# Patient Record
Sex: MALE | Race: BLACK OR AFRICAN AMERICAN | NOT HISPANIC OR LATINO | Employment: OTHER | URBAN - METROPOLITAN AREA
[De-identification: names, ages, dates, MRNs, and addresses within clinical notes are randomized per-mention and may not be internally consistent; named-entity substitution may affect disease eponyms.]

---

## 2017-01-24 ENCOUNTER — OFFICE VISIT - HEALTHEAST (OUTPATIENT)
Dept: FAMILY MEDICINE | Facility: CLINIC | Age: 77
End: 2017-01-24

## 2017-01-24 ENCOUNTER — COMMUNICATION - HEALTHEAST (OUTPATIENT)
Dept: FAMILY MEDICINE | Facility: CLINIC | Age: 77
End: 2017-01-24

## 2017-01-24 DIAGNOSIS — S01.91XA LACERATION OF HEAD: ICD-10-CM

## 2017-02-21 ENCOUNTER — RECORDS - HEALTHEAST (OUTPATIENT)
Dept: ADMINISTRATIVE | Facility: OTHER | Age: 77
End: 2017-02-21

## 2017-02-24 ENCOUNTER — AMBULATORY - HEALTHEAST (OUTPATIENT)
Dept: PULMONOLOGY | Facility: OTHER | Age: 77
End: 2017-02-24

## 2017-02-24 ENCOUNTER — HOME CARE/HOSPICE - HEALTHEAST (OUTPATIENT)
Dept: HOME HEALTH SERVICES | Facility: HOME HEALTH | Age: 77
End: 2017-02-24

## 2017-02-24 DIAGNOSIS — R91.1 LUNG NODULE: ICD-10-CM

## 2017-02-25 ENCOUNTER — COMMUNICATION - HEALTHEAST (OUTPATIENT)
Dept: SCHEDULING | Facility: CLINIC | Age: 77
End: 2017-02-25

## 2017-02-28 ENCOUNTER — HOME CARE/HOSPICE - HEALTHEAST (OUTPATIENT)
Dept: HOME HEALTH SERVICES | Facility: HOME HEALTH | Age: 77
End: 2017-02-28

## 2017-03-01 ENCOUNTER — COMMUNICATION - HEALTHEAST (OUTPATIENT)
Dept: FAMILY MEDICINE | Facility: CLINIC | Age: 77
End: 2017-03-01

## 2017-03-04 ENCOUNTER — HOME CARE/HOSPICE - HEALTHEAST (OUTPATIENT)
Dept: HOME HEALTH SERVICES | Facility: HOME HEALTH | Age: 77
End: 2017-03-04

## 2017-03-04 ASSESSMENT — MIFFLIN-ST. JEOR: SCORE: 2024.88

## 2017-03-05 ENCOUNTER — HOME CARE/HOSPICE - HEALTHEAST (OUTPATIENT)
Dept: HOME HEALTH SERVICES | Facility: HOME HEALTH | Age: 77
End: 2017-03-05

## 2017-03-06 ENCOUNTER — HOME CARE/HOSPICE - HEALTHEAST (OUTPATIENT)
Dept: HOME HEALTH SERVICES | Facility: HOME HEALTH | Age: 77
End: 2017-03-06

## 2017-03-06 ENCOUNTER — RECORDS - HEALTHEAST (OUTPATIENT)
Dept: ADMINISTRATIVE | Facility: OTHER | Age: 77
End: 2017-03-06

## 2017-03-08 ENCOUNTER — HOME CARE/HOSPICE - HEALTHEAST (OUTPATIENT)
Dept: HOME HEALTH SERVICES | Facility: HOME HEALTH | Age: 77
End: 2017-03-08

## 2017-03-10 ENCOUNTER — RECORDS - HEALTHEAST (OUTPATIENT)
Dept: ADMINISTRATIVE | Facility: OTHER | Age: 77
End: 2017-03-10

## 2017-03-10 ENCOUNTER — HOME CARE/HOSPICE - HEALTHEAST (OUTPATIENT)
Dept: HOME HEALTH SERVICES | Facility: HOME HEALTH | Age: 77
End: 2017-03-10

## 2017-03-13 ENCOUNTER — HOME CARE/HOSPICE - HEALTHEAST (OUTPATIENT)
Dept: HOME HEALTH SERVICES | Facility: HOME HEALTH | Age: 77
End: 2017-03-13

## 2017-03-15 ENCOUNTER — HOME CARE/HOSPICE - HEALTHEAST (OUTPATIENT)
Dept: HOME HEALTH SERVICES | Facility: HOME HEALTH | Age: 77
End: 2017-03-15

## 2017-03-17 ENCOUNTER — HOME CARE/HOSPICE - HEALTHEAST (OUTPATIENT)
Dept: HOME HEALTH SERVICES | Facility: HOME HEALTH | Age: 77
End: 2017-03-17

## 2017-03-20 ENCOUNTER — HOME CARE/HOSPICE - HEALTHEAST (OUTPATIENT)
Dept: HOME HEALTH SERVICES | Facility: HOME HEALTH | Age: 77
End: 2017-03-20

## 2017-03-24 ENCOUNTER — HOME CARE/HOSPICE - HEALTHEAST (OUTPATIENT)
Dept: HOME HEALTH SERVICES | Facility: HOME HEALTH | Age: 77
End: 2017-03-24

## 2017-03-29 ENCOUNTER — OFFICE VISIT - HEALTHEAST (OUTPATIENT)
Dept: SLEEP MEDICINE | Facility: CLINIC | Age: 77
End: 2017-03-29

## 2017-03-29 DIAGNOSIS — R79.81 ELEVATED CO2 LEVEL: ICD-10-CM

## 2017-03-29 DIAGNOSIS — G47.33 OSA (OBSTRUCTIVE SLEEP APNEA): ICD-10-CM

## 2017-03-30 ENCOUNTER — COMMUNICATION - HEALTHEAST (OUTPATIENT)
Dept: PULMONOLOGY | Facility: OTHER | Age: 77
End: 2017-03-30

## 2017-03-31 ENCOUNTER — HOME CARE/HOSPICE - HEALTHEAST (OUTPATIENT)
Dept: HOME HEALTH SERVICES | Facility: HOME HEALTH | Age: 77
End: 2017-03-31

## 2017-04-04 ENCOUNTER — RECORDS - HEALTHEAST (OUTPATIENT)
Dept: SLEEP MEDICINE | Facility: CLINIC | Age: 77
End: 2017-04-04

## 2017-04-04 ENCOUNTER — RECORDS - HEALTHEAST (OUTPATIENT)
Dept: ADMINISTRATIVE | Facility: OTHER | Age: 77
End: 2017-04-04

## 2017-04-04 DIAGNOSIS — R79.81 ABNORMAL BLOOD-GAS LEVEL: ICD-10-CM

## 2017-04-04 DIAGNOSIS — G47.33 OBSTRUCTIVE SLEEP APNEA (ADULT) (PEDIATRIC): ICD-10-CM

## 2017-04-19 ENCOUNTER — AMBULATORY - HEALTHEAST (OUTPATIENT)
Dept: SLEEP MEDICINE | Facility: CLINIC | Age: 77
End: 2017-04-19

## 2017-04-19 ENCOUNTER — OFFICE VISIT - HEALTHEAST (OUTPATIENT)
Dept: SLEEP MEDICINE | Facility: CLINIC | Age: 77
End: 2017-04-19

## 2017-04-19 DIAGNOSIS — G47.34 SLEEP RELATED HYPOXIA: ICD-10-CM

## 2017-04-19 DIAGNOSIS — G47.33 OSA (OBSTRUCTIVE SLEEP APNEA): ICD-10-CM

## 2017-04-19 DIAGNOSIS — J44.9 CHRONIC OBSTRUCTIVE PULMONARY DISEASE, UNSPECIFIED COPD TYPE (H): ICD-10-CM

## 2017-04-20 ENCOUNTER — COMMUNICATION - HEALTHEAST (OUTPATIENT)
Dept: SLEEP MEDICINE | Facility: CLINIC | Age: 77
End: 2017-04-20

## 2017-04-21 ENCOUNTER — HOME CARE/HOSPICE - HEALTHEAST (OUTPATIENT)
Dept: HOME HEALTH SERVICES | Facility: HOME HEALTH | Age: 77
End: 2017-04-21

## 2017-04-25 ENCOUNTER — COMMUNICATION - HEALTHEAST (OUTPATIENT)
Dept: SCHEDULING | Facility: CLINIC | Age: 77
End: 2017-04-25

## 2017-06-07 ENCOUNTER — OFFICE VISIT - HEALTHEAST (OUTPATIENT)
Dept: SLEEP MEDICINE | Facility: CLINIC | Age: 77
End: 2017-06-07

## 2017-06-07 ENCOUNTER — AMBULATORY - HEALTHEAST (OUTPATIENT)
Dept: SLEEP MEDICINE | Facility: CLINIC | Age: 77
End: 2017-06-07

## 2017-06-07 DIAGNOSIS — G47.33 OSA ON CPAP: ICD-10-CM

## 2017-06-07 ASSESSMENT — MIFFLIN-ST. JEOR: SCORE: 1979.52

## 2017-12-07 ENCOUNTER — AMBULATORY - HEALTHEAST (OUTPATIENT)
Dept: SLEEP MEDICINE | Facility: CLINIC | Age: 77
End: 2017-12-07

## 2018-02-28 ENCOUNTER — RECORDS - HEALTHEAST (OUTPATIENT)
Dept: LAB | Facility: CLINIC | Age: 78
End: 2018-02-28

## 2018-02-28 LAB
ALBUMIN SERPL-MCNC: 3.8 G/DL (ref 3.5–5)
ALP SERPL-CCNC: 83 U/L (ref 45–120)
ALT SERPL W P-5'-P-CCNC: 17 U/L (ref 0–45)
ANION GAP SERPL CALCULATED.3IONS-SCNC: 8 MMOL/L (ref 5–18)
AST SERPL W P-5'-P-CCNC: 13 U/L (ref 0–40)
BASOPHILS # BLD AUTO: 0.1 THOU/UL (ref 0–0.2)
BASOPHILS NFR BLD AUTO: 1 % (ref 0–2)
BILIRUB SERPL-MCNC: 0.4 MG/DL (ref 0–1)
BUN SERPL-MCNC: 29 MG/DL (ref 8–28)
CALCIUM SERPL-MCNC: 9.5 MG/DL (ref 8.5–10.5)
CHLORIDE BLD-SCNC: 100 MMOL/L (ref 98–107)
CO2 SERPL-SCNC: 29 MMOL/L (ref 22–31)
CREAT SERPL-MCNC: 1.75 MG/DL (ref 0.7–1.3)
EOSINOPHIL # BLD AUTO: 0.1 THOU/UL (ref 0–0.4)
EOSINOPHIL NFR BLD AUTO: 1 % (ref 0–6)
ERYTHROCYTE [DISTWIDTH] IN BLOOD BY AUTOMATED COUNT: 14.6 % (ref 11–14.5)
GFR SERPL CREATININE-BSD FRML MDRD: 38 ML/MIN/1.73M2
GLUCOSE BLD-MCNC: 99 MG/DL (ref 70–125)
HCT VFR BLD AUTO: 42.6 % (ref 40–54)
HGB BLD-MCNC: 12.9 G/DL (ref 14–18)
LYMPHOCYTES # BLD AUTO: 2.2 THOU/UL (ref 0.8–4.4)
LYMPHOCYTES NFR BLD AUTO: 27 % (ref 20–40)
MCH RBC QN AUTO: 27.1 PG (ref 27–34)
MCHC RBC AUTO-ENTMCNC: 30.3 G/DL (ref 32–36)
MCV RBC AUTO: 90 FL (ref 80–100)
MONOCYTES # BLD AUTO: 0.7 THOU/UL (ref 0–0.9)
MONOCYTES NFR BLD AUTO: 8 % (ref 2–10)
NEUTROPHILS # BLD AUTO: 5.3 THOU/UL (ref 2–7.7)
NEUTROPHILS NFR BLD AUTO: 64 % (ref 50–70)
PLATELET # BLD AUTO: 235 THOU/UL (ref 140–440)
PMV BLD AUTO: 10.8 FL (ref 8.5–12.5)
POTASSIUM BLD-SCNC: 5.5 MMOL/L (ref 3.5–5)
PROT SERPL-MCNC: 7.6 G/DL (ref 6–8)
RBC # BLD AUTO: 4.76 MILL/UL (ref 4.4–6.2)
SODIUM SERPL-SCNC: 137 MMOL/L (ref 136–145)
WBC: 8.3 THOU/UL (ref 4–11)

## 2018-06-01 ENCOUNTER — RECORDS - HEALTHEAST (OUTPATIENT)
Dept: ADMINISTRATIVE | Facility: OTHER | Age: 78
End: 2018-06-01

## 2018-06-01 LAB
LAB AP CHARGES (HE HISTORICAL CONVERSION): NORMAL
PATH REPORT.COMMENTS IMP SPEC: NORMAL
PATH REPORT.COMMENTS IMP SPEC: NORMAL
PATH REPORT.FINAL DX SPEC: NORMAL
PATH REPORT.GROSS SPEC: NORMAL
PATH REPORT.MICROSCOPIC SPEC OTHER STN: NORMAL
PATH REPORT.RELEVANT HX SPEC: NORMAL
RESULT FLAG (HE HISTORICAL CONVERSION): NORMAL

## 2019-03-27 ENCOUNTER — RECORDS - HEALTHEAST (OUTPATIENT)
Dept: LAB | Facility: CLINIC | Age: 79
End: 2019-03-27

## 2019-03-27 LAB
ALBUMIN SERPL-MCNC: 4.1 G/DL (ref 3.5–5)
ALP SERPL-CCNC: 71 U/L (ref 45–120)
ALT SERPL W P-5'-P-CCNC: 13 U/L (ref 0–45)
ANION GAP SERPL CALCULATED.3IONS-SCNC: 7 MMOL/L (ref 5–18)
AST SERPL W P-5'-P-CCNC: 13 U/L (ref 0–40)
BASOPHILS # BLD AUTO: 0 THOU/UL (ref 0–0.2)
BASOPHILS NFR BLD AUTO: 0 % (ref 0–2)
BILIRUB SERPL-MCNC: 0.4 MG/DL (ref 0–1)
BUN SERPL-MCNC: 23 MG/DL (ref 8–28)
CALCIUM SERPL-MCNC: 9.5 MG/DL (ref 8.5–10.5)
CHLORIDE BLD-SCNC: 102 MMOL/L (ref 98–107)
CHOLEST SERPL-MCNC: 176 MG/DL
CO2 SERPL-SCNC: 31 MMOL/L (ref 22–31)
CREAT SERPL-MCNC: 1.57 MG/DL (ref 0.7–1.3)
EOSINOPHIL # BLD AUTO: 0 THOU/UL (ref 0–0.4)
EOSINOPHIL NFR BLD AUTO: 0 % (ref 0–6)
ERYTHROCYTE [DISTWIDTH] IN BLOOD BY AUTOMATED COUNT: 15.2 % (ref 11–14.5)
FASTING STATUS PATIENT QL REPORTED: NORMAL
GFR SERPL CREATININE-BSD FRML MDRD: 43 ML/MIN/1.73M2
GLUCOSE BLD-MCNC: 74 MG/DL (ref 70–125)
HCT VFR BLD AUTO: 43.1 % (ref 40–54)
HDLC SERPL-MCNC: 48 MG/DL
HGB BLD-MCNC: 13 G/DL (ref 14–18)
LDLC SERPL CALC-MCNC: 105 MG/DL
LYMPHOCYTES # BLD AUTO: 2.4 THOU/UL (ref 0.8–4.4)
LYMPHOCYTES NFR BLD AUTO: 32 % (ref 20–40)
MCH RBC QN AUTO: 27.4 PG (ref 27–34)
MCHC RBC AUTO-ENTMCNC: 30.2 G/DL (ref 32–36)
MCV RBC AUTO: 91 FL (ref 80–100)
MONOCYTES # BLD AUTO: 0.6 THOU/UL (ref 0–0.9)
MONOCYTES NFR BLD AUTO: 7 % (ref 2–10)
NEUTROPHILS # BLD AUTO: 4.5 THOU/UL (ref 2–7.7)
NEUTROPHILS NFR BLD AUTO: 60 % (ref 50–70)
PLATELET # BLD AUTO: 229 THOU/UL (ref 140–440)
PMV BLD AUTO: 10.8 FL (ref 8.5–12.5)
POTASSIUM BLD-SCNC: 5.1 MMOL/L (ref 3.5–5)
PROT SERPL-MCNC: 7.4 G/DL (ref 6–8)
PSA SERPL-MCNC: 5.3 NG/ML (ref 0–6.5)
RBC # BLD AUTO: 4.75 MILL/UL (ref 4.4–6.2)
SODIUM SERPL-SCNC: 140 MMOL/L (ref 136–145)
TRIGL SERPL-MCNC: 115 MG/DL
WBC: 7.6 THOU/UL (ref 4–11)

## 2019-03-28 LAB — HBA1C MFR BLD: 6.6 % (ref 4.2–6.1)

## 2020-05-15 ENCOUNTER — RECORDS - HEALTHEAST (OUTPATIENT)
Dept: LAB | Facility: CLINIC | Age: 80
End: 2020-05-15

## 2020-05-15 LAB
ALBUMIN SERPL-MCNC: 3.9 G/DL (ref 3.5–5)
ALBUMIN UR-MCNC: ABNORMAL MG/DL
ALP SERPL-CCNC: 61 U/L (ref 45–120)
ALT SERPL W P-5'-P-CCNC: 12 U/L (ref 0–45)
AMORPH CRY #/AREA URNS HPF: ABNORMAL /[HPF]
ANION GAP SERPL CALCULATED.3IONS-SCNC: 9 MMOL/L (ref 5–18)
APPEARANCE UR: ABNORMAL
AST SERPL W P-5'-P-CCNC: 12 U/L (ref 0–40)
BACTERIA #/AREA URNS HPF: ABNORMAL HPF
BASOPHILS # BLD AUTO: 0 THOU/UL (ref 0–0.2)
BASOPHILS NFR BLD AUTO: 0 % (ref 0–2)
BILIRUB SERPL-MCNC: 0.4 MG/DL (ref 0–1)
BILIRUB UR QL STRIP: NEGATIVE
BUN SERPL-MCNC: 32 MG/DL (ref 8–28)
CALCIUM SERPL-MCNC: 9.2 MG/DL (ref 8.5–10.5)
CHLORIDE BLD-SCNC: 101 MMOL/L (ref 98–107)
CHOLEST SERPL-MCNC: 146 MG/DL
CO2 SERPL-SCNC: 29 MMOL/L (ref 22–31)
COLOR UR AUTO: YELLOW
CREAT SERPL-MCNC: 2.07 MG/DL (ref 0.7–1.3)
EOSINOPHIL # BLD AUTO: 0.1 THOU/UL (ref 0–0.4)
EOSINOPHIL NFR BLD AUTO: 1 % (ref 0–6)
ERYTHROCYTE [DISTWIDTH] IN BLOOD BY AUTOMATED COUNT: 15.3 % (ref 11–14.5)
FASTING STATUS PATIENT QL REPORTED: NORMAL
GFR SERPL CREATININE-BSD FRML MDRD: 31 ML/MIN/1.73M2
GLUCOSE BLD-MCNC: 96 MG/DL (ref 70–125)
GLUCOSE UR STRIP-MCNC: NEGATIVE MG/DL
HBA1C MFR BLD: 7.2 %
HCT VFR BLD AUTO: 45.7 % (ref 40–54)
HDLC SERPL-MCNC: 46 MG/DL
HGB BLD-MCNC: 13.7 G/DL (ref 14–18)
HGB UR QL STRIP: NEGATIVE
HYALINE CASTS #/AREA URNS LPF: ABNORMAL LPF
KETONES UR STRIP-MCNC: NEGATIVE MG/DL
LDLC SERPL CALC-MCNC: 74 MG/DL
LEUKOCYTE ESTERASE UR QL STRIP: NEGATIVE
LYMPHOCYTES # BLD AUTO: 2.6 THOU/UL (ref 0.8–4.4)
LYMPHOCYTES NFR BLD AUTO: 33 % (ref 20–40)
MCH RBC QN AUTO: 27.6 PG (ref 27–34)
MCHC RBC AUTO-ENTMCNC: 30 G/DL (ref 32–36)
MCV RBC AUTO: 92 FL (ref 80–100)
MONOCYTES # BLD AUTO: 0.7 THOU/UL (ref 0–0.9)
MONOCYTES NFR BLD AUTO: 9 % (ref 2–10)
MUCOUS THREADS #/AREA URNS LPF: ABNORMAL LPF
NEUTROPHILS # BLD AUTO: 4.6 THOU/UL (ref 2–7.7)
NEUTROPHILS NFR BLD AUTO: 57 % (ref 50–70)
NITRATE UR QL: NEGATIVE
PH UR STRIP: 5.5 [PH] (ref 4.5–8)
PLATELET # BLD AUTO: 214 THOU/UL (ref 140–440)
PMV BLD AUTO: 11.6 FL (ref 8.5–12.5)
POTASSIUM BLD-SCNC: 5.2 MMOL/L (ref 3.5–5)
PROT SERPL-MCNC: 7.5 G/DL (ref 6–8)
RBC # BLD AUTO: 4.97 MILL/UL (ref 4.4–6.2)
RBC #/AREA URNS AUTO: ABNORMAL HPF
SODIUM SERPL-SCNC: 139 MMOL/L (ref 136–145)
SP GR UR STRIP: 1.02 (ref 1–1.03)
SQUAMOUS #/AREA URNS AUTO: ABNORMAL LPF
TRIGL SERPL-MCNC: 132 MG/DL
UROBILINOGEN UR STRIP-ACNC: ABNORMAL
WBC #/AREA URNS AUTO: ABNORMAL HPF
WBC: 8 THOU/UL (ref 4–11)

## 2021-05-25 ENCOUNTER — RECORDS - HEALTHEAST (OUTPATIENT)
Dept: ADMINISTRATIVE | Facility: CLINIC | Age: 81
End: 2021-05-25

## 2021-05-27 ENCOUNTER — RECORDS - HEALTHEAST (OUTPATIENT)
Dept: ADMINISTRATIVE | Facility: CLINIC | Age: 81
End: 2021-05-27

## 2021-05-28 ENCOUNTER — RECORDS - HEALTHEAST (OUTPATIENT)
Dept: LAB | Facility: CLINIC | Age: 81
End: 2021-05-28

## 2021-05-28 LAB
ALBUMIN SERPL-MCNC: 4.2 G/DL (ref 3.5–5)
ALP SERPL-CCNC: 72 U/L (ref 45–120)
ALT SERPL W P-5'-P-CCNC: 14 U/L (ref 0–45)
ANION GAP SERPL CALCULATED.3IONS-SCNC: 11 MMOL/L (ref 5–18)
AST SERPL W P-5'-P-CCNC: 13 U/L (ref 0–40)
BASOPHILS # BLD AUTO: 0 THOU/UL (ref 0–0.2)
BASOPHILS NFR BLD AUTO: 0 % (ref 0–2)
BILIRUB SERPL-MCNC: 0.4 MG/DL (ref 0–1)
BUN SERPL-MCNC: 37 MG/DL (ref 8–28)
CALCIUM SERPL-MCNC: 9.1 MG/DL (ref 8.5–10.5)
CHLORIDE BLD-SCNC: 100 MMOL/L (ref 98–107)
CO2 SERPL-SCNC: 26 MMOL/L (ref 22–31)
CREAT SERPL-MCNC: 2.02 MG/DL (ref 0.7–1.3)
EOSINOPHIL # BLD AUTO: 0.1 THOU/UL (ref 0–0.4)
EOSINOPHIL NFR BLD AUTO: 1 % (ref 0–6)
ERYTHROCYTE [DISTWIDTH] IN BLOOD BY AUTOMATED COUNT: 14.8 % (ref 11–14.5)
GFR SERPL CREATININE-BSD FRML MDRD: 32 ML/MIN/1.73M2
GLUCOSE BLD-MCNC: 113 MG/DL (ref 70–125)
HCT VFR BLD AUTO: 42.2 % (ref 40–54)
HGB BLD-MCNC: 13.1 G/DL (ref 14–18)
IMM GRANULOCYTES # BLD: 0 THOU/UL
IMM GRANULOCYTES NFR BLD: 0 %
LIPASE SERPL-CCNC: 32 U/L (ref 0–52)
LYMPHOCYTES # BLD AUTO: 2.3 THOU/UL (ref 0.8–4.4)
LYMPHOCYTES NFR BLD AUTO: 30 % (ref 20–40)
MCH RBC QN AUTO: 28.4 PG (ref 27–34)
MCHC RBC AUTO-ENTMCNC: 31 G/DL (ref 32–36)
MCV RBC AUTO: 92 FL (ref 80–100)
MONOCYTES # BLD AUTO: 0.7 THOU/UL (ref 0–0.9)
MONOCYTES NFR BLD AUTO: 9 % (ref 2–10)
NEUTROPHILS # BLD AUTO: 4.5 THOU/UL (ref 2–7.7)
NEUTROPHILS NFR BLD AUTO: 60 % (ref 50–70)
PLATELET # BLD AUTO: 198 THOU/UL (ref 140–440)
PMV BLD AUTO: 11 FL (ref 8.5–12.5)
POTASSIUM BLD-SCNC: 5.7 MMOL/L (ref 3.5–5)
PROT SERPL-MCNC: 7.4 G/DL (ref 6–8)
RBC # BLD AUTO: 4.61 MILL/UL (ref 4.4–6.2)
SODIUM SERPL-SCNC: 137 MMOL/L (ref 136–145)
WBC: 7.5 THOU/UL (ref 4–11)

## 2021-05-30 VITALS — BODY MASS INDEX: 33.92 KG/M2 | WEIGHT: 271.4 LBS

## 2021-05-30 VITALS — HEIGHT: 73 IN | BODY MASS INDEX: 36.84 KG/M2 | WEIGHT: 278 LBS

## 2021-05-30 VITALS — WEIGHT: 269.5 LBS | BODY MASS INDEX: 35.56 KG/M2

## 2021-05-31 VITALS — HEIGHT: 73 IN | WEIGHT: 268 LBS | BODY MASS INDEX: 35.52 KG/M2

## 2021-06-08 ENCOUNTER — RECORDS - HEALTHEAST (OUTPATIENT)
Dept: LAB | Facility: CLINIC | Age: 81
End: 2021-06-08

## 2021-06-08 LAB
ALBUMIN SERPL-MCNC: 4.1 G/DL (ref 3.5–5)
ALP SERPL-CCNC: 75 U/L (ref 45–120)
ALT SERPL W P-5'-P-CCNC: 18 U/L (ref 0–45)
ANION GAP SERPL CALCULATED.3IONS-SCNC: 11 MMOL/L (ref 5–18)
AST SERPL W P-5'-P-CCNC: 14 U/L (ref 0–40)
BILIRUB SERPL-MCNC: 0.5 MG/DL (ref 0–1)
BUN SERPL-MCNC: 24 MG/DL (ref 8–28)
CALCIUM SERPL-MCNC: 9.1 MG/DL (ref 8.5–10.5)
CHLORIDE BLD-SCNC: 102 MMOL/L (ref 98–107)
CO2 SERPL-SCNC: 25 MMOL/L (ref 22–31)
CREAT SERPL-MCNC: 1.66 MG/DL (ref 0.7–1.3)
GFR SERPL CREATININE-BSD FRML MDRD: 40 ML/MIN/1.73M2
GLUCOSE BLD-MCNC: 112 MG/DL (ref 70–125)
POTASSIUM BLD-SCNC: 5.7 MMOL/L (ref 3.5–5)
PROT SERPL-MCNC: 7.2 G/DL (ref 6–8)
SODIUM SERPL-SCNC: 138 MMOL/L (ref 136–145)

## 2021-06-08 NOTE — PROGRESS NOTES
Assessment:      9 cm fore head laceration      Plan:      The wound area was irrigated with sterile saline and draped in a sterile fashion.  The wound area was anesthetized with Lidocaine 2% with epinephrine without added sodium bicarbonate.  The wound was explored with the following results No foreign bodies found.  The wound was repaired with 3-0 Ethilon; 13 sutures were used.  The wound was dressed and antibiotic ointment was applied.  Wound care discussed.  Tetanus immunization given.  Suture removal in 7 days.     Subjective:      Soto Gibbs is a 77 y.o. male who presents for evaluation of a laceration to the left forehead. Injury occurred 1 hour ago. The mechanism of the wound was hit head on toilet. The patient reports no coldness, no numbness, no pain, no paresthesias in the affected area. There were no other injuries.  Patient denies loss of consciousness, neck pain, chest pain, abdominal pain, extremity injury, numbness and weakness. The tetanus status is more than 10 years ago.  The following portions of the patient's history were reviewed and updated as appropriate: allergies, current medications, past family history, past medical history, past social history, past surgical history and problem list.    Review of Systems  A 12 point comprehensive review of systems was negative except as noted.      Objective:        Visit Vitals     /58     Pulse 69     Temp 98.2  F (36.8  C) (Oral)     Wt (!) 271 lb 6.4 oz (123.1 kg)     SpO2 (!) 84%     BMI 33.92 kg/m2     O2 sat not working correctly.  Patient in no distress, good color, no SOB.    There is a curved laceration measuring approximately 9 cm in length on the left forehead. Examination of the wound for foreign bodies and devitalized tissue showed none.  Examination of the surrounding area for neural or vascular damage showed none.

## 2021-06-09 NOTE — PROGRESS NOTES
Dear  Caity Jung Md  [address] [place] [address]    Thank you for the opportunity to participate in the care of  Soto Gibbs.    He is a 77 y.o. y/o who comes to the clinic for consultation.    We had some difficulties trying to identify if the patient was at the right clinic or not. The patient was under the impression that he was going to see a pulmonary doctor today. The patient was expecting to discuss some medications changes for his COPD.    We reviewed the medical chart and were able to identify that Mr. Gibbs was indeed referred to the sleep clinic and needs a sleep evaluation.     The patient has been experiencing difficulties breathing during the night. When he was at the hospital, he was found to have respiratory failure.    The patient had a PSG study in 2005 that showed overall mild FORTUNATO with an AHI of 6.4 and a recommendations for a pressure of 12 cwp.    Past Medical History  Past Medical History:   Diagnosis Date     COPD (chronic obstructive pulmonary disease)      Diabetes mellitus      Hypertension      Oxygen dependent     2 liters home        Past Surgical History  Past Surgical History:   Procedure Laterality Date     ABDOMINAL SURGERY      Hernia     COLON SURGERY      Polyps.        Meds  Current Outpatient Prescriptions   Medication Sig Dispense Refill     albuterol (PROVENTIL HFA;VENTOLIN HFA) 90 mcg/actuation inhaler Inhale 2 puffs every 6 (six) hours as needed for wheezing or shortness of breath. 8.5 g 0     albuterol (PROVENTIL) 2.5 mg /3 mL (0.083 %) nebulizer solution Take 3 mL (2.5 mg total) by nebulization every 4 (four) hours as needed for shortness of breath. 75 mL 0     aspirin 81 MG EC tablet Take 81 mg by mouth every evening.        atenolol (TENORMIN) 25 MG tablet Take 1 tablet (25 mg total) by mouth every morning. 30 tablet 0     lisinopril (PRINIVIL,ZESTRIL) 20 MG tablet Take 20 mg by mouth every morning.       omeprazole (PRILOSEC) 20 MG capsule Take 20 mg by  mouth daily before supper.       oxyCODONE-acetaminophen (PERCOCET) 5-325 mg per tablet Take 1 tablet by mouth every 4 (four) hours as needed for pain.       OXYGEN-AIR DELIVERY SYSTEMS MISC Inhale 2 L/min continuous. Indications: copd, sleep apnea       predniSONE (DELTASONE) 10 MG tablet Take 4 tab oral QDay x 3 days then 3 tab QDay x 3 days then 2 tab QDay x 3 days then 1 tab QDay x 3 days then stop. 30 tablet 0     simvastatin (ZOCOR) 20 MG tablet Take 20 mg by mouth every evening.        tiotropium (SPIRIVA) 18 mcg inhalation capsule Place 1 capsule (2 puffs total) into inhaler and inhale once daily. 30 capsule 0     triamterene-hydrochlorothiazide (DYAZIDE) 37.5-25 mg per capsule Take 1 capsule by mouth every morning.       No current facility-administered medications for this visit.         Allergies  Morphine (pf)     Social History  Social History     Social History     Marital status: Single     Spouse name: N/A     Number of children: N/A     Years of education: N/A     Occupational History     Not on file.     Social History Main Topics     Smoking status: Former Smoker     Quit date: 2/28/1985     Smokeless tobacco: Never Used     Alcohol use No     Drug use: No     Sexual activity: Not on file     Other Topics Concern     Not on file     Social History Narrative              Labs/Studies:     Lab Results   Component Value Date    WBC 9.3 03/01/2017    HGB 12.6 (L) 03/01/2017    HCT 40.7 03/01/2017    MCV 85 03/01/2017     03/02/2017         Chemistry        Component Value Date/Time     03/01/2017 0700    K 5.5 (H) 03/02/2017 0744    CL 97 (L) 03/01/2017 0700    CO2 35 (H) 03/01/2017 0700    BUN 25 03/01/2017 0700    CREATININE 1.20 03/01/2017 0700     (H) 03/01/2017 0700        Component Value Date/Time    CALCIUM 9.1 03/01/2017 0700    ALKPHOS 55 02/28/2017 0650    AST 18 02/28/2017 0650    ALT 12 02/28/2017 0650    BILITOT 0.4 02/28/2017 0650            Lab Results   Component  Value Date    FERRITIN 25 (L) 02/22/2017           Assessment and Plan:  In summary Soto Gibbs is a 77 y.o. year old male here for consultation.    1. Obstructive sleep apnea  Unclear if the patient is having worsening of his FORTUNATO  I will place an order for a new PSG study with the possibility of considering bi-level therapy.  I was able to clarify with the patient the need for this visit.    Note: The patient also wanted to discuss some of his medications with a pulmonologist and we were able to contact the lung clinic for him.     Patient verbalized understanding of these issues, agrees with the plan and all questions were answered today. Patient was given an opportuntity to voice any other symptoms or concerns not listed above. Patient did not have any other symptoms or concerns.      Dinesh Majano MD  [facility] Board Certified in Internal Medicine and Sleep Medicine  [facility].    We spent a total of 35 minutes during this appointment and more than 50% of the time was used for coordination of care.

## 2021-06-10 NOTE — PROGRESS NOTES
Overnight polysomnography was reviewed.   We will wait until his upcoming appointment today to discuss results and treatment options.

## 2021-06-10 NOTE — PROGRESS NOTES
Dear Dimitris Calderon MD  [address] [place] [address],    Thank you for the opportunity to participate in the care of Soto Gibbs.     He is a 77 y.o. y/o male patient who comes to the sleep medicine clinic for follow up.    We had an extensive conversation to review the results of his sleep study.    The overnight polysomnography was completed with a digital sleep system using the international 10-20 electrode placement for recording EEG, EOG, EMG from chin, ECG, respiratory effort, oximetry, body position, airflow, nasal pressure, snoring sound, pulse rate and limb movement channels.    The study was completed as a split night study.    1. Oxygen supplementation was assessed at the beginning of the test while the patient was supine and awake and the test was initiated with O2 at 1L/min  2. During the diagnostic portion of the study respiratory monitoring showed severe obstructive sleep apnea/hypopnea (AHI=49.4).  3. A trial of nasal CPAP was initiated given the severity of sleep-disordered breathing and CPAP of 9 cwp together with oxygen supplementation at 4 L/min was effective at eliminating obstructive events.     We reviewed the oxygen saturation graph as well as the result tables from the report.    Past Medical History:   Diagnosis Date     COPD (chronic obstructive pulmonary disease)      Diabetes mellitus      Hypertension      Oxygen dependent     2 liters home       Past Surgical History:   Procedure Laterality Date     ABDOMINAL SURGERY      Hernia     COLON SURGERY      Polyps.       Social History     Social History     Marital status: Single     Spouse name: N/A     Number of children: N/A     Years of education: N/A     Occupational History     Not on file.     Social History Main Topics     Smoking status: Former Smoker     Quit date: 2/28/1985     Smokeless tobacco: Never Used     Alcohol use No     Drug use: No     Sexual activity: Not on file     Other Topics Concern     Not on  file     Social History Narrative       Review of Systems:  General: No weight gain, no weight loss  Eyes: No vision changes  ENT: No hearing changes  Cardio: No chest pain, no nocturnal dyspnea  Respiratory: No shortness of breath, no cough  Gastrointestinal: No diarrhea, no constipation  Genitourinary: No excessive urination  Tegumentary: No rashes  Neurological: No seizures, no loss of consciousness  Endo: No heat or cold intolerance.    Current Outpatient Prescriptions   Medication Sig Dispense Refill     albuterol (PROVENTIL HFA;VENTOLIN HFA) 90 mcg/actuation inhaler Inhale 2 puffs every 6 (six) hours as needed for wheezing or shortness of breath. 8.5 g 0     albuterol (PROVENTIL) 2.5 mg /3 mL (0.083 %) nebulizer solution Take 3 mL (2.5 mg total) by nebulization every 4 (four) hours as needed for shortness of breath. 75 mL 0     aspirin 81 MG EC tablet Take 81 mg by mouth every evening.        atenolol (TENORMIN) 25 MG tablet Take 1 tablet (25 mg total) by mouth every morning. 30 tablet 0     lisinopril (PRINIVIL,ZESTRIL) 20 MG tablet Take 20 mg by mouth every morning.       omeprazole (PRILOSEC) 20 MG capsule Take 20 mg by mouth daily before supper.       oxyCODONE-acetaminophen (PERCOCET) 5-325 mg per tablet Take 1 tablet by mouth every 4 (four) hours as needed for pain.       OXYGEN-AIR DELIVERY SYSTEMS MISC Inhale 2 L/min continuous. Indications: copd, sleep apnea       predniSONE (DELTASONE) 10 MG tablet Take 4 tab oral QDay x 3 days then 3 tab QDay x 3 days then 2 tab QDay x 3 days then 1 tab QDay x 3 days then stop. 30 tablet 0     simvastatin (ZOCOR) 20 MG tablet Take 20 mg by mouth every evening.        tiotropium (SPIRIVA) 18 mcg inhalation capsule Place 1 capsule (2 puffs total) into inhaler and inhale once daily. 30 capsule 0     triamterene-hydrochlorothiazide (DYAZIDE) 37.5-25 mg per capsule Take 1 capsule by mouth every morning.       No current facility-administered medications for this visit.         Allergies   Allergen Reactions     Morphine (Pf) Other (See Comments) and Dizziness     Hallucinations       Physical Exam:  /50  Pulse 80  Wt (!) 269 lb 8 oz (122.2 kg)  BMI 35.56 kg/m2  BMI:Body mass index is 35.56 kg/(m^2).   GEN: NAD, obese  Neurological: Alert, oriented to time, place, and person.  Psych: normal mood, normal affect     Labs/Studies:  - We reviewed the results of the overnight PSG as described on the HPI.     Lab Results   Component Value Date    WBC 9.3 03/01/2017    HGB 12.6 (L) 03/01/2017    HCT 40.7 03/01/2017    MCV 85 03/01/2017     03/02/2017         Chemistry        Component Value Date/Time     03/01/2017 0700    K 5.5 (H) 03/02/2017 0744    CL 97 (L) 03/01/2017 0700    CO2 35 (H) 03/01/2017 0700    BUN 25 03/01/2017 0700    CREATININE 1.20 03/01/2017 0700     (H) 03/01/2017 0700        Component Value Date/Time    CALCIUM 9.1 03/01/2017 0700    ALKPHOS 55 02/28/2017 0650    AST 18 02/28/2017 0650    ALT 12 02/28/2017 0650    BILITOT 0.4 02/28/2017 0650         Assessment and Plan:  In summary Soto Gibbs is a 77 y.o. year old male here for follow up.    1. Obstructive Sleep Apnea  We had an extensive conversation to review the results of his sleep study and to  him on the importance of treating sleep apnea.   We will order a CPAP device with pressure of 9 cwp and 4 L/min of oxygen.  He will start using the device as soon as he receives it with the intention to use if for the entire night.  We discussed some tips to increase PAP tolerance as well as the normal curve of adaptation. CPAP is going to provide improved respiratory function during the night but it can cause some sleep disruption that tends to improve with continuous usage.  He should return to the clinic in 10 weeks to review compliance and efficacy monitoring.  We talked about insurance requirements and I encourage the patient to learn the specific details of his health insurance  plan regarding durable medical equipment.     Patient verbalized understanding of these issues, agrees with the plan and all questions were answered today. Patient was given an opportuntity to voice any other symptoms or concerns not listed above. Patient did not have any other symptoms or concerns.      Patient instructed to stop at  to schedule appointment before leaving today.    MD JOANA Kelly Board Certified in Internal Medicine and Sleep Medicine  [facility].

## 2021-06-10 NOTE — PROGRESS NOTES
Order for Durable Medical Equipment was processed and equipment ordered.   DME provider: [facility]  Date Faxed: 04/19/2017  Ordering Provider: Dr. Majano  Equipment ordered: Cpap

## 2021-06-11 NOTE — PROGRESS NOTES
Order for Durable Medical Equipment was processed and equipment ordered.   DME provider: [facility]  Date Faxed: 6/7/17   Ordering Provider: Dr. Majano  Equipment ordered: Over night oximetry

## 2021-06-11 NOTE — PROGRESS NOTES
Dear Dr. Dimitris Calderon MD  [address] [place] [address],    Thank you for the opportunity to participate in the care of Soto Gibbs.     He is a 77 y.o. y/o male patient who comes to the sleep medicine clinic for follow up.    He was diagnosed with severe FORTUNATO (AHI=49.4)  and has been using CPAP of 9 cwp with oxygen supplementation at 4 L/min.    His symptoms are improved since he started using CPAP. He is more refreshed in the morning. He denies any PAP intolerance. He is using the device every night and tolerates the pressure well.     Residual AHI: 1.2  Leak: 43.7  Compliance: 100%    Mask Tolerance: excellent  Skin irritation: no    Past Medical History:   Diagnosis Date     COPD (chronic obstructive pulmonary disease)      Diabetes mellitus      Hypertension      Oxygen dependent     2 liters home       Past Surgical History:   Procedure Laterality Date     ABDOMINAL SURGERY      Hernia     COLON SURGERY      Polyps.       Social History     Social History     Marital status: Single     Spouse name: N/A     Number of children: N/A     Years of education: N/A     Occupational History     Not on file.     Social History Main Topics     Smoking status: Former Smoker     Quit date: 2/28/1985     Smokeless tobacco: Never Used     Alcohol use No     Drug use: No     Sexual activity: Not on file     Other Topics Concern     Not on file     Social History Narrative       Review of Systems:  General: No weight gain, no weight loss  Eyes: No vision changes  ENT: No hearing changes  Cardio: No chest pain, no nocturnal dyspnea  Respiratory: No shortness of breath, no cough  Gastrointestinal: No diarrhea, no constipation  Genitourinary: No excessive urination  Tegumentary: No rashes  Neurological: No seizures, no loss of consciousness  Endo: No heat or cold intolerance.    Current Outpatient Prescriptions   Medication Sig Dispense Refill     albuterol (PROVENTIL HFA;VENTOLIN HFA) 90 mcg/actuation inhaler  "Inhale 2 puffs every 6 (six) hours as needed for wheezing or shortness of breath. 8.5 g 0     albuterol (PROVENTIL) 2.5 mg /3 mL (0.083 %) nebulizer solution Take 3 mL (2.5 mg total) by nebulization every 4 (four) hours as needed for shortness of breath. 75 mL 0     aspirin 81 MG EC tablet Take 81 mg by mouth every evening.        atenolol (TENORMIN) 25 MG tablet Take 1 tablet (25 mg total) by mouth every morning. 30 tablet 0     lisinopril (PRINIVIL,ZESTRIL) 20 MG tablet Take 20 mg by mouth every morning.       omeprazole (PRILOSEC) 20 MG capsule Take 20 mg by mouth daily before supper.       oxyCODONE-acetaminophen (PERCOCET) 5-325 mg per tablet Take 1 tablet by mouth every 4 (four) hours as needed for pain.       OXYGEN-AIR DELIVERY SYSTEMS MISC Inhale 2 L/min continuous. Indications: copd, sleep apnea       simvastatin (ZOCOR) 20 MG tablet Take 20 mg by mouth every evening.        tiotropium (SPIRIVA) 18 mcg inhalation capsule Place 1 capsule (2 puffs total) into inhaler and inhale once daily. 30 capsule 0     triamterene-hydrochlorothiazide (DYAZIDE) 37.5-25 mg per capsule Take 1 capsule by mouth every morning.       No current facility-administered medications for this visit.        Allergies   Allergen Reactions     Morphine (Pf) Other (See Comments) and Dizziness     Hallucinations       Physical Exam:  Pulse 78  Ht 6' 1\" (1.854 m)  Wt (!) 268 lb (121.6 kg)  SpO2 (!) 85%  BMI 35.36 kg/m2  BMI:Body mass index is 35.36 kg/(m^2).   GEN: NAD, obese  Neurological: Alert, oriented to time, place, and person.  Psych: normal mood, normal affect     Labs/Studies:     I reviewed the efficacy and compliance report from his device. Data summarized on the HPI and the CPAP compliance flow sheet.     Lab Results   Component Value Date    WBC 9.3 03/01/2017    HGB 12.6 (L) 03/01/2017    HCT 40.7 03/01/2017    MCV 85 03/01/2017     03/02/2017         Chemistry        Component Value Date/Time     03/01/2017 " 0700    K 5.5 (H) 03/02/2017 0744    CL 97 (L) 03/01/2017 0700    CO2 35 (H) 03/01/2017 0700    BUN 25 03/01/2017 0700    CREATININE 1.20 03/01/2017 0700     (H) 03/01/2017 0700        Component Value Date/Time    CALCIUM 9.1 03/01/2017 0700    ALKPHOS 55 02/28/2017 0650    AST 18 02/28/2017 0650    ALT 12 02/28/2017 0650    BILITOT 0.4 02/28/2017 0650           Assessment and Plan:  In summary Soto Gibbs is a 77 y.o. year old male who is here for follow up.    1. Obstructive Sleep Apnea  He had severe FORTUNATO.  Residual AHI is excellent.  Compliance is excellent.  We counseled the patient on the importannce of using his CPAP device every night and the risks of not treating sleep apnea.      Patient verbalized understanding of these issues, agrees with the plan and all questions were answered today. Patient was given an opportuntity to voice any other symptoms or concerns not listed above. Patient did not have any other symptoms or concerns.      Patient told to return in 12 months. Patient instructed to stop at  to schedule appointment before leaving today.    MD JOANA Kelly Board Certified in Internal Medicine and Sleep Medicine  [facility].

## 2021-06-14 NOTE — PROGRESS NOTES
Order for Durable Medical Equipment was processed and equipment ordered.     DME provider: [facility]  Date Faxed: 12/7/17  Ordering Provider:   Equipment ordered: [facility]/ all Cpap related documentation

## 2021-06-26 ENCOUNTER — HEALTH MAINTENANCE LETTER (OUTPATIENT)
Age: 81
End: 2021-06-26

## 2021-06-28 ENCOUNTER — RECORDS - HEALTHEAST (OUTPATIENT)
Dept: LAB | Facility: CLINIC | Age: 81
End: 2021-06-28

## 2021-06-28 LAB
ALBUMIN SERPL-MCNC: 3.9 G/DL (ref 3.5–5)
ALP SERPL-CCNC: 77 U/L (ref 45–120)
ALT SERPL W P-5'-P-CCNC: 10 U/L (ref 0–45)
ANION GAP SERPL CALCULATED.3IONS-SCNC: 11 MMOL/L (ref 5–18)
AST SERPL W P-5'-P-CCNC: 13 U/L (ref 0–40)
BASOPHILS # BLD AUTO: 0 THOU/UL (ref 0–0.2)
BASOPHILS NFR BLD AUTO: 0 % (ref 0–2)
BILIRUB SERPL-MCNC: 0.3 MG/DL (ref 0–1)
BUN SERPL-MCNC: 26 MG/DL (ref 8–28)
CALCIUM SERPL-MCNC: 8.7 MG/DL (ref 8.5–10.5)
CHLORIDE BLD-SCNC: 101 MMOL/L (ref 98–107)
CO2 SERPL-SCNC: 30 MMOL/L (ref 22–31)
CREAT SERPL-MCNC: 1.77 MG/DL (ref 0.7–1.3)
EOSINOPHIL # BLD AUTO: 0 THOU/UL (ref 0–0.4)
EOSINOPHIL NFR BLD AUTO: 1 % (ref 0–6)
ERYTHROCYTE [DISTWIDTH] IN BLOOD BY AUTOMATED COUNT: 14.6 % (ref 11–14.5)
GFR SERPL CREATININE-BSD FRML MDRD: 37 ML/MIN/1.73M2
GLUCOSE BLD-MCNC: 99 MG/DL (ref 70–125)
HCT VFR BLD AUTO: 41.6 % (ref 40–54)
HGB BLD-MCNC: 12.6 G/DL (ref 14–18)
IMM GRANULOCYTES # BLD: 0 THOU/UL
IMM GRANULOCYTES NFR BLD: 0 %
LYMPHOCYTES # BLD AUTO: 2.3 THOU/UL (ref 0.8–4.4)
LYMPHOCYTES NFR BLD AUTO: 31 % (ref 20–40)
MCH RBC QN AUTO: 28.3 PG (ref 27–34)
MCHC RBC AUTO-ENTMCNC: 30.3 G/DL (ref 32–36)
MCV RBC AUTO: 93 FL (ref 80–100)
MONOCYTES # BLD AUTO: 0.8 THOU/UL (ref 0–0.9)
MONOCYTES NFR BLD AUTO: 10 % (ref 2–10)
NEUTROPHILS # BLD AUTO: 4.4 THOU/UL (ref 2–7.7)
NEUTROPHILS NFR BLD AUTO: 58 % (ref 50–70)
PLATELET # BLD AUTO: 197 THOU/UL (ref 140–440)
PMV BLD AUTO: 11.5 FL (ref 8.5–12.5)
POTASSIUM BLD-SCNC: 4.8 MMOL/L (ref 3.5–5)
PROT SERPL-MCNC: 7.1 G/DL (ref 6–8)
RBC # BLD AUTO: 4.46 MILL/UL (ref 4.4–6.2)
SODIUM SERPL-SCNC: 142 MMOL/L (ref 136–145)
WBC: 7.6 THOU/UL (ref 4–11)

## 2021-10-16 ENCOUNTER — HEALTH MAINTENANCE LETTER (OUTPATIENT)
Age: 81
End: 2021-10-16

## 2021-12-03 ENCOUNTER — LAB REQUISITION (OUTPATIENT)
Dept: LAB | Facility: CLINIC | Age: 81
End: 2021-12-03
Payer: MEDICARE

## 2021-12-03 DIAGNOSIS — E11.9 TYPE 2 DIABETES MELLITUS WITHOUT COMPLICATIONS (H): ICD-10-CM

## 2021-12-03 DIAGNOSIS — Z12.5 ENCOUNTER FOR SCREENING FOR MALIGNANT NEOPLASM OF PROSTATE: ICD-10-CM

## 2021-12-03 LAB
ALBUMIN SERPL-MCNC: 4 G/DL (ref 3.5–5)
ALP SERPL-CCNC: 71 U/L (ref 45–120)
ALT SERPL W P-5'-P-CCNC: 12 U/L (ref 0–45)
ANION GAP SERPL CALCULATED.3IONS-SCNC: 10 MMOL/L (ref 5–18)
AST SERPL W P-5'-P-CCNC: 13 U/L (ref 0–40)
BASOPHILS # BLD AUTO: 0 10E3/UL (ref 0–0.2)
BASOPHILS NFR BLD AUTO: 0 %
BILIRUB SERPL-MCNC: 0.5 MG/DL (ref 0–1)
BUN SERPL-MCNC: 27 MG/DL (ref 8–28)
CALCIUM SERPL-MCNC: 9.1 MG/DL (ref 8.5–10.5)
CHLORIDE BLD-SCNC: 96 MMOL/L (ref 98–107)
CHOLEST SERPL-MCNC: 150 MG/DL
CO2 SERPL-SCNC: 32 MMOL/L (ref 22–31)
CREAT SERPL-MCNC: 1.85 MG/DL (ref 0.7–1.3)
EOSINOPHIL # BLD AUTO: 0 10E3/UL (ref 0–0.7)
EOSINOPHIL NFR BLD AUTO: 0 %
ERYTHROCYTE [DISTWIDTH] IN BLOOD BY AUTOMATED COUNT: 14.5 % (ref 10–15)
FASTING STATUS PATIENT QL REPORTED: NO
GFR SERPL CREATININE-BSD FRML MDRD: 34 ML/MIN/1.73M2
GLUCOSE BLD-MCNC: 152 MG/DL (ref 70–125)
HCT VFR BLD AUTO: 43.6 % (ref 40–53)
HDLC SERPL-MCNC: 47 MG/DL
HGB BLD-MCNC: 13.4 G/DL (ref 13.3–17.7)
IMM GRANULOCYTES # BLD: 0 10E3/UL
IMM GRANULOCYTES NFR BLD: 0 %
LDLC SERPL CALC-MCNC: 80 MG/DL
LYMPHOCYTES # BLD AUTO: 2.2 10E3/UL (ref 0.8–5.3)
LYMPHOCYTES NFR BLD AUTO: 31 %
MCH RBC QN AUTO: 28.1 PG (ref 26.5–33)
MCHC RBC AUTO-ENTMCNC: 30.7 G/DL (ref 31.5–36.5)
MCV RBC AUTO: 91 FL (ref 78–100)
MONOCYTES # BLD AUTO: 0.6 10E3/UL (ref 0–1.3)
MONOCYTES NFR BLD AUTO: 8 %
NEUTROPHILS # BLD AUTO: 4.1 10E3/UL (ref 1.6–8.3)
NEUTROPHILS NFR BLD AUTO: 61 %
NRBC # BLD AUTO: 0 10E3/UL
NRBC BLD AUTO-RTO: 0 /100
PLATELET # BLD AUTO: 220 10E3/UL (ref 150–450)
POTASSIUM BLD-SCNC: 4.5 MMOL/L (ref 3.5–5)
PROT SERPL-MCNC: 7.5 G/DL (ref 6–8)
PSA SERPL-MCNC: 6.45 UG/L (ref 0–6.5)
RBC # BLD AUTO: 4.77 10E6/UL (ref 4.4–5.9)
SODIUM SERPL-SCNC: 138 MMOL/L (ref 136–145)
TRIGL SERPL-MCNC: 117 MG/DL
WBC # BLD AUTO: 7 10E3/UL (ref 4–11)

## 2021-12-03 PROCEDURE — 80061 LIPID PANEL: CPT | Mod: ORL | Performed by: FAMILY MEDICINE

## 2021-12-03 PROCEDURE — 80053 COMPREHEN METABOLIC PANEL: CPT | Mod: ORL | Performed by: FAMILY MEDICINE

## 2021-12-03 PROCEDURE — 85025 COMPLETE CBC W/AUTO DIFF WBC: CPT | Mod: ORL | Performed by: FAMILY MEDICINE

## 2021-12-03 PROCEDURE — G0103 PSA SCREENING: HCPCS | Mod: ORL | Performed by: FAMILY MEDICINE

## 2021-12-11 ENCOUNTER — HEALTH MAINTENANCE LETTER (OUTPATIENT)
Age: 81
End: 2021-12-11

## 2022-01-01 ENCOUNTER — APPOINTMENT (OUTPATIENT)
Dept: RADIOLOGY | Facility: CLINIC | Age: 82
End: 2022-01-01
Attending: EMERGENCY MEDICINE
Payer: MEDICARE

## 2022-01-01 ENCOUNTER — PATIENT OUTREACH (OUTPATIENT)
Dept: ONCOLOGY | Facility: CLINIC | Age: 82
End: 2022-01-01

## 2022-01-01 ENCOUNTER — PRE VISIT (OUTPATIENT)
Dept: UROLOGY | Facility: CLINIC | Age: 82
End: 2022-01-01

## 2022-01-01 ENCOUNTER — PATIENT OUTREACH (OUTPATIENT)
Dept: CARE COORDINATION | Facility: CLINIC | Age: 82
End: 2022-01-01

## 2022-01-01 ENCOUNTER — ANCILLARY PROCEDURE (OUTPATIENT)
Dept: CT IMAGING | Facility: CLINIC | Age: 82
End: 2022-01-01
Attending: NURSE PRACTITIONER
Payer: MEDICARE

## 2022-01-01 ENCOUNTER — HEALTH MAINTENANCE LETTER (OUTPATIENT)
Age: 82
End: 2022-01-01

## 2022-01-01 ENCOUNTER — HOSPITAL ENCOUNTER (EMERGENCY)
Facility: CLINIC | Age: 82
Discharge: ANOTHER HOSPITAL | End: 2022-09-06
Attending: EMERGENCY MEDICINE | Admitting: EMERGENCY MEDICINE
Payer: MEDICARE

## 2022-01-01 ENCOUNTER — MYC MEDICAL ADVICE (OUTPATIENT)
Dept: NEUROSURGERY | Facility: CLINIC | Age: 82
End: 2022-01-01

## 2022-01-01 ENCOUNTER — HOSPITAL ENCOUNTER (OUTPATIENT)
Dept: RADIOLOGY | Facility: CLINIC | Age: 82
Discharge: HOME | End: 2022-12-21
Attending: INTERNAL MEDICINE
Payer: MEDICARE

## 2022-01-01 ENCOUNTER — APPOINTMENT (OUTPATIENT)
Dept: OCCUPATIONAL THERAPY | Facility: CLINIC | Age: 82
DRG: 085 | End: 2022-01-01
Attending: NURSE PRACTITIONER
Payer: MEDICARE

## 2022-01-01 ENCOUNTER — APPOINTMENT (OUTPATIENT)
Dept: CT IMAGING | Facility: CLINIC | Age: 82
End: 2022-01-01
Attending: EMERGENCY MEDICINE
Payer: MEDICARE

## 2022-01-01 ENCOUNTER — OFFICE VISIT (OUTPATIENT)
Dept: NEUROSURGERY | Facility: CLINIC | Age: 82
End: 2022-01-01
Payer: MEDICARE

## 2022-01-01 ENCOUNTER — TRANSFERRED RECORDS (OUTPATIENT)
Dept: HEALTH INFORMATION MANAGEMENT | Facility: CLINIC | Age: 82
End: 2022-01-01

## 2022-01-01 ENCOUNTER — APPOINTMENT (OUTPATIENT)
Dept: GENERAL RADIOLOGY | Facility: CLINIC | Age: 82
DRG: 085 | End: 2022-01-01
Attending: NURSE PRACTITIONER
Payer: MEDICARE

## 2022-01-01 ENCOUNTER — APPOINTMENT (OUTPATIENT)
Dept: CT IMAGING | Facility: CLINIC | Age: 82
DRG: 085 | End: 2022-01-01
Attending: NURSE PRACTITIONER
Payer: MEDICARE

## 2022-01-01 ENCOUNTER — LAB REQUISITION (OUTPATIENT)
Dept: LAB | Facility: CLINIC | Age: 82
End: 2022-01-01
Payer: MEDICARE

## 2022-01-01 ENCOUNTER — APPOINTMENT (OUTPATIENT)
Dept: PHYSICAL THERAPY | Facility: CLINIC | Age: 82
DRG: 085 | End: 2022-01-01
Attending: NURSE PRACTITIONER
Payer: MEDICARE

## 2022-01-01 ENCOUNTER — HOSPITAL ENCOUNTER (OUTPATIENT)
Dept: CT IMAGING | Facility: HOSPITAL | Age: 82
Discharge: HOME | End: 2022-11-02
Attending: NURSE PRACTITIONER | Admitting: NURSE PRACTITIONER
Payer: MEDICARE

## 2022-01-01 ENCOUNTER — HOSPITAL ENCOUNTER (INPATIENT)
Facility: CLINIC | Age: 82
LOS: 2 days | Discharge: HOME | DRG: 085 | End: 2022-09-08
Attending: EMERGENCY MEDICINE | Admitting: SURGERY
Payer: MEDICARE

## 2022-01-01 VITALS
TEMPERATURE: 98.3 F | DIASTOLIC BLOOD PRESSURE: 53 MMHG | OXYGEN SATURATION: 100 % | HEART RATE: 77 BPM | RESPIRATION RATE: 30 BRPM | WEIGHT: 247.8 LBS | SYSTOLIC BLOOD PRESSURE: 124 MMHG | HEIGHT: 75 IN | BODY MASS INDEX: 30.81 KG/M2

## 2022-01-01 VITALS
RESPIRATION RATE: 30 BRPM | HEIGHT: 75 IN | DIASTOLIC BLOOD PRESSURE: 68 MMHG | BODY MASS INDEX: 34.69 KG/M2 | OXYGEN SATURATION: 94 % | HEART RATE: 83 BPM | SYSTOLIC BLOOD PRESSURE: 153 MMHG

## 2022-01-01 VITALS
SYSTOLIC BLOOD PRESSURE: 109 MMHG | RESPIRATION RATE: 16 BRPM | DIASTOLIC BLOOD PRESSURE: 78 MMHG | OXYGEN SATURATION: 91 % | HEART RATE: 73 BPM

## 2022-01-01 DIAGNOSIS — E11.9 TYPE 2 DIABETES MELLITUS WITHOUT COMPLICATIONS (H): ICD-10-CM

## 2022-01-01 DIAGNOSIS — M62.81 MUSCLE WEAKNESS (GENERALIZED): ICD-10-CM
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
## 2022-01-01 DIAGNOSIS — S06.5XAA SUBDURAL HEMATOMA (H): Primary | ICD-10-CM

## 2022-01-01 DIAGNOSIS — S06.5XAA SUBDURAL HEMATOMA (H): ICD-10-CM

## 2022-01-01 DIAGNOSIS — R29.6 FALLS FREQUENTLY: ICD-10-CM

## 2022-01-01 DIAGNOSIS — E78.5 HYPERLIPIDEMIA, UNSPECIFIED: ICD-10-CM

## 2022-01-01 DIAGNOSIS — Z12.5 ENCOUNTER FOR SCREENING FOR MALIGNANT NEOPLASM OF PROSTATE: ICD-10-CM

## 2022-01-01 LAB
ABO/RH(D): NORMAL
ALBUMIN SERPL BCG-MCNC: 4 G/DL (ref 3.5–5.2)
ALBUMIN SERPL-MCNC: 3 G/DL (ref 3.5–5)
ALBUMIN UR-MCNC: 70 MG/DL
ALBUMIN UR-MCNC: 70 MG/DL
ALP SERPL-CCNC: 62 U/L (ref 40–129)
ALP SERPL-CCNC: 67 U/L (ref 45–120)
ALT SERPL W P-5'-P-CCNC: 15 U/L (ref 10–50)
ALT SERPL W P-5'-P-CCNC: <9 U/L (ref 0–45)
ANION GAP SERPL CALCULATED.3IONS-SCNC: 10 MMOL/L (ref 7–15)
ANION GAP SERPL CALCULATED.3IONS-SCNC: 11 MMOL/L (ref 7–15)
ANION GAP SERPL CALCULATED.3IONS-SCNC: 11 MMOL/L (ref 7–15)
ANION GAP SERPL CALCULATED.3IONS-SCNC: 8 MMOL/L (ref 5–18)
ANION GAP SERPL CALCULATED.3IONS-SCNC: 8 MMOL/L (ref 7–15)
ANTIBODY SCREEN: NEGATIVE
APPEARANCE UR: ABNORMAL
APPEARANCE UR: CLEAR
AST SERPL W P-5'-P-CCNC: 12 U/L (ref 0–40)
AST SERPL W P-5'-P-CCNC: 28 U/L (ref 10–50)
ATRIAL RATE - MUSE: 72 BPM
BASE EXCESS BLDA CALC-SCNC: 12.9 MMOL/L (ref -9–1.8)
BASE EXCESS BLDV CALC-SCNC: 12 MMOL/L (ref -7.7–1.9)
BASE EXCESS BLDV CALC-SCNC: 13.5 MMOL/L (ref -7.7–1.9)
BASE EXCESS BLDV CALC-SCNC: 17 MMOL/L
BASE EXCESS BLDV CALC-SCNC: 9.8 MMOL/L (ref -7.7–1.9)
BASOPHILS # BLD AUTO: 0 10E3/UL (ref 0–0.2)
BASOPHILS NFR BLD AUTO: 0 %
BILIRUB SERPL-MCNC: 0.4 MG/DL
BILIRUB SERPL-MCNC: 0.4 MG/DL (ref 0–1)
BILIRUB UR QL STRIP: NEGATIVE
BILIRUB UR QL STRIP: NEGATIVE
BNP SERPL-MCNC: 22 PG/ML (ref 0–88)
BUN SERPL-MCNC: 25.3 MG/DL (ref 8–23)
BUN SERPL-MCNC: 29 MG/DL (ref 8–23)
BUN SERPL-MCNC: 35.9 MG/DL (ref 8–23)
BUN SERPL-MCNC: 36.3 MG/DL (ref 8–23)
BUN SERPL-MCNC: 40 MG/DL (ref 8–28)
CALCIUM SERPL-MCNC: 9 MG/DL (ref 8.8–10.2)
CALCIUM SERPL-MCNC: 9.1 MG/DL (ref 8.8–10.2)
CALCIUM SERPL-MCNC: 9.3 MG/DL (ref 8.8–10.2)
CALCIUM SERPL-MCNC: 9.4 MG/DL (ref 8.5–10.5)
CALCIUM SERPL-MCNC: 9.4 MG/DL (ref 8.8–10.2)
CHLORIDE BLD-SCNC: 94 MMOL/L (ref 98–107)
CHLORIDE SERPL-SCNC: 89 MMOL/L (ref 98–107)
CHLORIDE SERPL-SCNC: 97 MMOL/L (ref 98–107)
CHLORIDE SERPL-SCNC: 98 MMOL/L (ref 98–107)
CHLORIDE SERPL-SCNC: 99 MMOL/L (ref 98–107)
CHOLEST SERPL-MCNC: 192 MG/DL
CO2 SERPL-SCNC: 40 MMOL/L (ref 22–31)
COLOR UR AUTO: ABNORMAL
COLOR UR AUTO: ABNORMAL
CREAT SERPL-MCNC: 1.54 MG/DL (ref 0.67–1.17)
CREAT SERPL-MCNC: 1.65 MG/DL (ref 0.67–1.17)
CREAT SERPL-MCNC: 1.71 MG/DL (ref 0.67–1.17)
CREAT SERPL-MCNC: 2 MG/DL (ref 0.67–1.17)
CREAT SERPL-MCNC: 2.07 MG/DL (ref 0.7–1.3)
CRP SERPL-MCNC: 3.87 MG/L
DEPRECATED HCO3 PLAS-SCNC: 31 MMOL/L (ref 22–29)
DEPRECATED HCO3 PLAS-SCNC: 34 MMOL/L (ref 22–29)
DEPRECATED HCO3 PLAS-SCNC: 35 MMOL/L (ref 22–29)
DEPRECATED HCO3 PLAS-SCNC: 35 MMOL/L (ref 22–29)
DIASTOLIC BLOOD PRESSURE - MUSE: 78 MMHG
EOSINOPHIL # BLD AUTO: 0 10E3/UL (ref 0–0.7)
EOSINOPHIL NFR BLD AUTO: 1 %
ERYTHROCYTE [DISTWIDTH] IN BLOOD BY AUTOMATED COUNT: 14.6 % (ref 10–15)
ERYTHROCYTE [DISTWIDTH] IN BLOOD BY AUTOMATED COUNT: 14.7 % (ref 10–15)
ERYTHROCYTE [DISTWIDTH] IN BLOOD BY AUTOMATED COUNT: 15 % (ref 10–15)
ERYTHROCYTE [DISTWIDTH] IN BLOOD BY AUTOMATED COUNT: 15.1 % (ref 10–15)
ERYTHROCYTE [SEDIMENTATION RATE] IN BLOOD BY WESTERGREN METHOD: 61 MM/HR (ref 0–20)
GFR SERPL CREATININE-BSD FRML MDRD: 32 ML/MIN/1.73M2
GFR SERPL CREATININE-BSD FRML MDRD: 33 ML/MIN/1.73M2
GFR SERPL CREATININE-BSD FRML MDRD: 40 ML/MIN/1.73M2
GFR SERPL CREATININE-BSD FRML MDRD: 41 ML/MIN/1.73M2
GFR SERPL CREATININE-BSD FRML MDRD: 45 ML/MIN/1.73M2
GLUCOSE BLD-MCNC: 117 MG/DL (ref 70–125)
GLUCOSE BLDC GLUCOMTR-MCNC: 116 MG/DL (ref 70–99)
GLUCOSE BLDC GLUCOMTR-MCNC: 120 MG/DL (ref 70–99)
GLUCOSE BLDC GLUCOMTR-MCNC: 128 MG/DL (ref 70–99)
GLUCOSE BLDC GLUCOMTR-MCNC: 130 MG/DL (ref 70–99)
GLUCOSE BLDC GLUCOMTR-MCNC: 132 MG/DL (ref 70–99)
GLUCOSE BLDC GLUCOMTR-MCNC: 135 MG/DL (ref 70–99)
GLUCOSE BLDC GLUCOMTR-MCNC: 136 MG/DL (ref 70–99)
GLUCOSE BLDC GLUCOMTR-MCNC: 169 MG/DL (ref 70–99)
GLUCOSE BLDC GLUCOMTR-MCNC: 174 MG/DL (ref 70–99)
GLUCOSE SERPL-MCNC: 103 MG/DL (ref 70–99)
GLUCOSE SERPL-MCNC: 144 MG/DL (ref 70–99)
GLUCOSE UR STRIP-MCNC: NEGATIVE MG/DL
GLUCOSE UR STRIP-MCNC: NEGATIVE MG/DL
HBA1C MFR BLD: 6.6 %
HCO3 BLD-SCNC: 41 MMOL/L (ref 21–28)
HCO3 BLDV-SCNC: 36 MMOL/L (ref 21–28)
HCO3 BLDV-SCNC: 36 MMOL/L (ref 24–30)
HCO3 BLDV-SCNC: 38 MMOL/L (ref 21–28)
HCO3 BLDV-SCNC: 40 MMOL/L (ref 21–28)
HCT VFR BLD AUTO: 34.5 % (ref 40–53)
HCT VFR BLD AUTO: 36.8 % (ref 40–53)
HCT VFR BLD AUTO: 37.5 % (ref 40–53)
HCT VFR BLD AUTO: 37.7 % (ref 40–53)
HDLC SERPL-MCNC: 54 MG/DL
HEMOCCULT STL QL: NEGATIVE
HGB BLD-MCNC: 10.5 G/DL (ref 13.3–17.7)
HGB BLD-MCNC: 10.8 G/DL (ref 13.3–17.7)
HGB BLD-MCNC: 11 G/DL (ref 13.3–17.7)
HGB BLD-MCNC: 11.2 G/DL (ref 13.3–17.7)
HGB BLD-MCNC: 11.4 G/DL (ref 13.3–17.7)
HGB UR QL STRIP: ABNORMAL
HGB UR QL STRIP: ABNORMAL
HYALINE CASTS: 3 /LPF
IMM GRANULOCYTES # BLD: 0 10E3/UL
IMM GRANULOCYTES NFR BLD: 0 %
INR PPP: 1.1 (ref 0.85–1.15)
INTERPRETATION ECG - MUSE: NORMAL
KETONES UR STRIP-MCNC: NEGATIVE MG/DL
KETONES UR STRIP-MCNC: NEGATIVE MG/DL
LDLC SERPL CALC-MCNC: 108 MG/DL
LEUKOCYTE ESTERASE UR QL STRIP: ABNORMAL
LEUKOCYTE ESTERASE UR QL STRIP: NEGATIVE
LYMPHOCYTES # BLD AUTO: 1.9 10E3/UL (ref 0.8–5.3)
LYMPHOCYTES NFR BLD AUTO: 32 %
MAGNESIUM SERPL-MCNC: 2 MG/DL (ref 1.7–2.3)
MCH RBC QN AUTO: 27.5 PG (ref 26.5–33)
MCH RBC QN AUTO: 27.6 PG (ref 26.5–33)
MCH RBC QN AUTO: 27.6 PG (ref 26.5–33)
MCH RBC QN AUTO: 27.7 PG (ref 26.5–33)
MCHC RBC AUTO-ENTMCNC: 29.3 G/DL (ref 31.5–36.5)
MCHC RBC AUTO-ENTMCNC: 29.3 G/DL (ref 31.5–36.5)
MCHC RBC AUTO-ENTMCNC: 29.7 G/DL (ref 31.5–36.5)
MCHC RBC AUTO-ENTMCNC: 30.4 G/DL (ref 31.5–36.5)
MCV RBC AUTO: 91 FL (ref 78–100)
MCV RBC AUTO: 93 FL (ref 78–100)
MCV RBC AUTO: 94 FL (ref 78–100)
MCV RBC AUTO: 94 FL (ref 78–100)
MONOCYTES # BLD AUTO: 0.4 10E3/UL (ref 0–1.3)
MONOCYTES NFR BLD AUTO: 7 %
MUCOUS THREADS #/AREA URNS LPF: PRESENT /LPF
MUCOUS THREADS #/AREA URNS LPF: PRESENT /LPF
NEUTROPHILS # BLD AUTO: 3.6 10E3/UL (ref 1.6–8.3)
NEUTROPHILS NFR BLD AUTO: 60 %
NITRATE UR QL: NEGATIVE
NITRATE UR QL: NEGATIVE
NONHDLC SERPL-MCNC: 138 MG/DL
NRBC # BLD AUTO: 0 10E3/UL
NRBC BLD AUTO-RTO: 0 /100
O2/TOTAL GAS SETTING VFR VENT: 21 %
O2/TOTAL GAS SETTING VFR VENT: 25 %
O2/TOTAL GAS SETTING VFR VENT: 30 %
O2/TOTAL GAS SETTING VFR VENT: 30 %
OXYHGB MFR BLD: 92 % (ref 92–100)
OXYHGB MFR BLDV: 37.3 % (ref 70–75)
OXYHGB MFR BLDV: 81 % (ref 70–75)
OXYHGB MFR BLDV: 90 % (ref 70–75)
P AXIS - MUSE: 64 DEGREES
PCO2 BLD: 68 MM HG (ref 35–45)
PCO2 BLDV: 54 MM HG (ref 40–50)
PCO2 BLDV: 56 MM HG (ref 40–50)
PCO2 BLDV: 61 MM HG (ref 40–50)
PCO2 BLDV: 89 MM HG (ref 35–50)
PH BLD: 7.38 [PH] (ref 7.35–7.45)
PH BLDV: 7.3 [PH] (ref 7.35–7.45)
PH BLDV: 7.42 [PH] (ref 7.32–7.43)
PH BLDV: 7.43 [PH] (ref 7.32–7.43)
PH BLDV: 7.45 [PH] (ref 7.32–7.43)
PH UR STRIP: 5.5 [PH] (ref 5–7)
PH UR STRIP: 8 [PH] (ref 5–7)
PHOSPHATE SERPL-MCNC: 1.8 MG/DL (ref 2.5–4.5)
PHOSPHATE SERPL-MCNC: 2.6 MG/DL (ref 2.5–4.5)
PLATELET # BLD AUTO: 219 10E3/UL (ref 150–450)
PLATELET # BLD AUTO: 277 10E3/UL (ref 150–450)
PLATELET # BLD AUTO: 280 10E3/UL (ref 150–450)
PLATELET # BLD AUTO: 286 10E3/UL (ref 150–450)
PO2 BLD: 67 MM HG (ref 80–105)
PO2 BLDV: 24 MM HG (ref 25–47)
PO2 BLDV: 45 MM HG (ref 25–47)
PO2 BLDV: 55 MM HG (ref 25–47)
PO2 BLDV: 59 MM HG (ref 25–47)
POTASSIUM BLD-SCNC: 4.1 MMOL/L (ref 3.5–5)
POTASSIUM SERPL-SCNC: 3.8 MMOL/L (ref 3.4–5.3)
POTASSIUM SERPL-SCNC: 3.9 MMOL/L (ref 3.4–5.3)
POTASSIUM SERPL-SCNC: 4.2 MMOL/L (ref 3.4–5.3)
POTASSIUM SERPL-SCNC: 4.3 MMOL/L (ref 3.4–5.3)
PR INTERVAL - MUSE: 150 MS
PROT SERPL-MCNC: 7.1 G/DL (ref 6.4–8.3)
PROT SERPL-MCNC: 7.7 G/DL (ref 6–8)
PSA SERPL-MCNC: 4.49 NG/ML
QRS DURATION - MUSE: 108 MS
QT - MUSE: 370 MS
QTC - MUSE: 405 MS
R AXIS - MUSE: -9 DEGREES
RBC # BLD AUTO: 3.79 10E6/UL (ref 4.4–5.9)
RBC # BLD AUTO: 3.91 10E6/UL (ref 4.4–5.9)
RBC # BLD AUTO: 4 10E6/UL (ref 4.4–5.9)
RBC # BLD AUTO: 4.06 10E6/UL (ref 4.4–5.9)
RBC URINE: 5 /HPF
RBC URINE: >182 /HPF
SAO2 % BLDV: 38.1 % (ref 70–75)
SARS-COV-2 RNA RESP QL NAA+PROBE: NEGATIVE
SODIUM SERPL-SCNC: 134 MMOL/L (ref 136–145)
SODIUM SERPL-SCNC: 140 MMOL/L (ref 136–145)
SODIUM SERPL-SCNC: 141 MMOL/L (ref 136–145)
SODIUM SERPL-SCNC: 142 MMOL/L (ref 136–145)
SODIUM SERPL-SCNC: 143 MMOL/L (ref 136–145)
SP GR UR STRIP: 1.01 (ref 1–1.03)
SP GR UR STRIP: 1.01 (ref 1–1.03)
SPECIMEN EXPIRATION DATE: NORMAL
SQUAMOUS EPITHELIAL: 1 /HPF
SQUAMOUS EPITHELIAL: <1 /HPF
SYSTOLIC BLOOD PRESSURE - MUSE: 137 MMHG
T AXIS - MUSE: 42 DEGREES
T4 FREE SERPL-MCNC: 1.33 NG/DL (ref 0.9–1.7)
TRIGL SERPL-MCNC: 149 MG/DL
TROPONIN I SERPL-MCNC: 0.02 NG/ML (ref 0–0.29)
TSH SERPL DL<=0.005 MIU/L-ACNC: 0.85 UIU/ML (ref 0.3–4.2)
UROBILINOGEN UR STRIP-MCNC: <2 MG/DL
UROBILINOGEN UR STRIP-MCNC: NORMAL MG/DL
VENTRICULAR RATE- MUSE: 72 BPM
WBC # BLD AUTO: 6 10E3/UL (ref 4–11)
WBC # BLD AUTO: 7.3 10E3/UL (ref 4–11)
WBC # BLD AUTO: 8.1 10E3/UL (ref 4–11)
WBC # BLD AUTO: 8.2 10E3/UL (ref 4–11)
WBC URINE: 2 /HPF
WBC URINE: 8 /HPF

## 2022-01-01 PROCEDURE — 82310 ASSAY OF CALCIUM: CPT | Performed by: NURSE PRACTITIONER

## 2022-01-01 PROCEDURE — 74220 X-RAY XM ESOPHAGUS 1CNTRST: CPT

## 2022-01-01 PROCEDURE — 99233 SBSQ HOSP IP/OBS HIGH 50: CPT | Performed by: STUDENT IN AN ORGANIZED HEALTH CARE EDUCATION/TRAINING PROGRAM

## 2022-01-01 PROCEDURE — 999N000157 HC STATISTIC RCP TIME EA 10 MIN

## 2022-01-01 PROCEDURE — G0103 PSA SCREENING: HCPCS | Mod: ORL | Performed by: FAMILY MEDICINE

## 2022-01-01 PROCEDURE — 97162 PT EVAL MOD COMPLEX 30 MIN: CPT | Mod: GP

## 2022-01-01 PROCEDURE — 81001 URINALYSIS AUTO W/SCOPE: CPT | Performed by: PHYSICIAN ASSISTANT

## 2022-01-01 PROCEDURE — 73080 X-RAY EXAM OF ELBOW: CPT | Mod: LT

## 2022-01-01 PROCEDURE — 97535 SELF CARE MNGMENT TRAINING: CPT | Mod: GO

## 2022-01-01 PROCEDURE — 250N000011 HC RX IP 250 OP 636: Performed by: NURSE PRACTITIONER

## 2022-01-01 PROCEDURE — 73110 X-RAY EXAM OF WRIST: CPT | Mod: LT

## 2022-01-01 PROCEDURE — 36415 COLL VENOUS BLD VENIPUNCTURE: CPT | Performed by: SURGERY

## 2022-01-01 PROCEDURE — 682N000003 HC TRAUMA EVALUATION W/O CC LEVEL II: Performed by: EMERGENCY MEDICINE

## 2022-01-01 PROCEDURE — 120N000003 HC R&B IMCU UMMC

## 2022-01-01 PROCEDURE — 85027 COMPLETE CBC AUTOMATED: CPT | Performed by: EMERGENCY MEDICINE

## 2022-01-01 PROCEDURE — 85014 HEMATOCRIT: CPT | Performed by: NURSE PRACTITIONER

## 2022-01-01 PROCEDURE — 99213 OFFICE O/P EST LOW 20 MIN: CPT | Performed by: NURSE PRACTITIONER

## 2022-01-01 PROCEDURE — 84443 ASSAY THYROID STIM HORMONE: CPT | Mod: ORL | Performed by: FAMILY MEDICINE

## 2022-01-01 PROCEDURE — 70450 CT HEAD/BRAIN W/O DYE: CPT | Mod: 26 | Performed by: RADIOLOGY

## 2022-01-01 PROCEDURE — 71045 X-RAY EXAM CHEST 1 VIEW: CPT | Mod: 26 | Performed by: RADIOLOGY

## 2022-01-01 PROCEDURE — 94660 CPAP INITIATION&MGMT: CPT

## 2022-01-01 PROCEDURE — 99291 CRITICAL CARE FIRST HOUR: CPT

## 2022-01-01 PROCEDURE — 99284 EMERGENCY DEPT VISIT MOD MDM: CPT | Performed by: STUDENT IN AN ORGANIZED HEALTH CARE EDUCATION/TRAINING PROGRAM

## 2022-01-01 PROCEDURE — 73030 X-RAY EXAM OF SHOULDER: CPT | Mod: LT

## 2022-01-01 PROCEDURE — G1010 CDSM STANSON: HCPCS | Mod: GC | Performed by: RADIOLOGY

## 2022-01-01 PROCEDURE — 86140 C-REACTIVE PROTEIN: CPT | Mod: ORL | Performed by: FAMILY MEDICINE

## 2022-01-01 PROCEDURE — 99292 CRITICAL CARE ADDL 30 MIN: CPT

## 2022-01-01 PROCEDURE — 250N000013 HC RX MED GY IP 250 OP 250 PS 637: Performed by: NURSE PRACTITIONER

## 2022-01-01 PROCEDURE — 250N000013 HC RX MED GY IP 250 OP 250 PS 637: Performed by: STUDENT IN AN ORGANIZED HEALTH CARE EDUCATION/TRAINING PROGRAM

## 2022-01-01 PROCEDURE — 83735 ASSAY OF MAGNESIUM: CPT | Performed by: SURGERY

## 2022-01-01 PROCEDURE — 96365 THER/PROPH/DIAG IV INF INIT: CPT

## 2022-01-01 PROCEDURE — 85652 RBC SED RATE AUTOMATED: CPT | Mod: ORL | Performed by: FAMILY MEDICINE

## 2022-01-01 PROCEDURE — 250N000011 HC RX IP 250 OP 636: Performed by: STUDENT IN AN ORGANIZED HEALTH CARE EDUCATION/TRAINING PROGRAM

## 2022-01-01 PROCEDURE — 81001 URINALYSIS AUTO W/SCOPE: CPT | Performed by: EMERGENCY MEDICINE

## 2022-01-01 PROCEDURE — 84439 ASSAY OF FREE THYROXINE: CPT | Mod: ORL | Performed by: FAMILY MEDICINE

## 2022-01-01 PROCEDURE — 83880 ASSAY OF NATRIURETIC PEPTIDE: CPT | Mod: GZ | Performed by: EMERGENCY MEDICINE

## 2022-01-01 PROCEDURE — 70450 CT HEAD/BRAIN W/O DYE: CPT | Mod: ME | Performed by: RADIOLOGY

## 2022-01-01 PROCEDURE — 80053 COMPREHEN METABOLIC PANEL: CPT | Mod: ORL | Performed by: FAMILY MEDICINE

## 2022-01-01 PROCEDURE — 99239 HOSP IP/OBS DSCHRG MGMT >30: CPT | Performed by: PHYSICIAN ASSISTANT

## 2022-01-01 PROCEDURE — 96375 TX/PRO/DX INJ NEW DRUG ADDON: CPT

## 2022-01-01 PROCEDURE — 258N000003 HC RX IP 258 OP 636: Performed by: EMERGENCY MEDICINE

## 2022-01-01 PROCEDURE — 71045 X-RAY EXAM CHEST 1 VIEW: CPT

## 2022-01-01 PROCEDURE — U0005 INFEC AGEN DETEC AMPLI PROBE: HCPCS | Performed by: EMERGENCY MEDICINE

## 2022-01-01 PROCEDURE — 82803 BLOOD GASES ANY COMBINATION: CPT | Performed by: NURSE PRACTITIONER

## 2022-01-01 PROCEDURE — 73560 X-RAY EXAM OF KNEE 1 OR 2: CPT | Mod: LT

## 2022-01-01 PROCEDURE — 96365 THER/PROPH/DIAG IV INF INIT: CPT | Performed by: EMERGENCY MEDICINE

## 2022-01-01 PROCEDURE — 36415 COLL VENOUS BLD VENIPUNCTURE: CPT | Performed by: NURSE PRACTITIONER

## 2022-01-01 PROCEDURE — 99221 1ST HOSP IP/OBS SF/LOW 40: CPT | Performed by: NURSE PRACTITIONER

## 2022-01-01 PROCEDURE — 84100 ASSAY OF PHOSPHORUS: CPT | Performed by: SURGERY

## 2022-01-01 PROCEDURE — 86850 RBC ANTIBODY SCREEN: CPT | Performed by: EMERGENCY MEDICINE

## 2022-01-01 PROCEDURE — 99232 SBSQ HOSP IP/OBS MODERATE 35: CPT | Performed by: NURSE PRACTITIONER

## 2022-01-01 PROCEDURE — 97165 OT EVAL LOW COMPLEX 30 MIN: CPT | Mod: GO

## 2022-01-01 PROCEDURE — 93005 ELECTROCARDIOGRAM TRACING: CPT | Performed by: EMERGENCY MEDICINE

## 2022-01-01 PROCEDURE — 85018 HEMOGLOBIN: CPT | Performed by: NURSE PRACTITIONER

## 2022-01-01 PROCEDURE — 99222 1ST HOSP IP/OBS MODERATE 55: CPT | Performed by: NURSE PRACTITIONER

## 2022-01-01 PROCEDURE — 70450 CT HEAD/BRAIN W/O DYE: CPT | Mod: MG

## 2022-01-01 PROCEDURE — 250N000011 HC RX IP 250 OP 636: Performed by: EMERGENCY MEDICINE

## 2022-01-01 PROCEDURE — 80053 COMPREHEN METABOLIC PANEL: CPT | Performed by: EMERGENCY MEDICINE

## 2022-01-01 PROCEDURE — 82805 BLOOD GASES W/O2 SATURATION: CPT | Performed by: NURSE PRACTITIONER

## 2022-01-01 PROCEDURE — 82310 ASSAY OF CALCIUM: CPT | Performed by: SURGERY

## 2022-01-01 PROCEDURE — 83036 HEMOGLOBIN GLYCOSYLATED A1C: CPT | Mod: ORL | Performed by: FAMILY MEDICINE

## 2022-01-01 PROCEDURE — 96361 HYDRATE IV INFUSION ADD-ON: CPT | Performed by: EMERGENCY MEDICINE

## 2022-01-01 PROCEDURE — 86901 BLOOD TYPING SEROLOGIC RH(D): CPT | Performed by: EMERGENCY MEDICINE

## 2022-01-01 PROCEDURE — G1010 CDSM STANSON: HCPCS | Performed by: RADIOLOGY

## 2022-01-01 PROCEDURE — 84100 ASSAY OF PHOSPHORUS: CPT | Performed by: STUDENT IN AN ORGANIZED HEALTH CARE EDUCATION/TRAINING PROGRAM

## 2022-01-01 PROCEDURE — 99291 CRITICAL CARE FIRST HOUR: CPT | Mod: 25

## 2022-01-01 PROCEDURE — 82805 BLOOD GASES W/O2 SATURATION: CPT | Performed by: EMERGENCY MEDICINE

## 2022-01-01 PROCEDURE — 258N000003 HC RX IP 258 OP 636: Performed by: NURSE PRACTITIONER

## 2022-01-01 PROCEDURE — 80061 LIPID PANEL: CPT | Mod: ORL | Performed by: FAMILY MEDICINE

## 2022-01-01 PROCEDURE — 99285 EMERGENCY DEPT VISIT HI MDM: CPT | Mod: 25 | Performed by: EMERGENCY MEDICINE

## 2022-01-01 PROCEDURE — 85610 PROTHROMBIN TIME: CPT | Performed by: EMERGENCY MEDICINE

## 2022-01-01 PROCEDURE — G1010 CDSM STANSON: HCPCS

## 2022-01-01 PROCEDURE — 96375 TX/PRO/DX INJ NEW DRUG ADDON: CPT | Performed by: EMERGENCY MEDICINE

## 2022-01-01 PROCEDURE — 84484 ASSAY OF TROPONIN QUANT: CPT | Performed by: EMERGENCY MEDICINE

## 2022-01-01 PROCEDURE — C9803 HOPD COVID-19 SPEC COLLECT: HCPCS

## 2022-01-01 PROCEDURE — 5A09357 ASSISTANCE WITH RESPIRATORY VENTILATION, LESS THAN 24 CONSECUTIVE HOURS, CONTINUOUS POSITIVE AIRWAY PRESSURE: ICD-10-PCS | Performed by: SURGERY

## 2022-01-01 PROCEDURE — 85025 COMPLETE CBC W/AUTO DIFF WBC: CPT | Mod: ORL | Performed by: FAMILY MEDICINE

## 2022-01-01 PROCEDURE — 85027 COMPLETE CBC AUTOMATED: CPT | Performed by: SURGERY

## 2022-01-01 PROCEDURE — 82272 OCCULT BLD FECES 1-3 TESTS: CPT | Performed by: EMERGENCY MEDICINE

## 2022-01-01 PROCEDURE — 36415 COLL VENOUS BLD VENIPUNCTURE: CPT | Performed by: EMERGENCY MEDICINE

## 2022-01-01 PROCEDURE — 93010 ELECTROCARDIOGRAM REPORT: CPT | Performed by: EMERGENCY MEDICINE

## 2022-01-01 PROCEDURE — 73502 X-RAY EXAM HIP UNI 2-3 VIEWS: CPT

## 2022-01-01 PROCEDURE — 250N000009 HC RX 250: Performed by: NURSE PRACTITIONER

## 2022-01-01 PROCEDURE — 80048 BASIC METABOLIC PNL TOTAL CA: CPT | Performed by: NURSE PRACTITIONER

## 2022-01-01 RX ORDER — CARBOXYMETHYLCELLULOSE SODIUM 5 MG/ML
1 SOLUTION/ DROPS OPHTHALMIC
Status: DISCONTINUED | OUTPATIENT
Start: 2022-01-01 | End: 2022-01-01 | Stop reason: HOSPADM

## 2022-01-01 RX ORDER — LABETALOL HYDROCHLORIDE 5 MG/ML
10-20 INJECTION, SOLUTION INTRAVENOUS EVERY 4 HOURS PRN
Status: DISCONTINUED | OUTPATIENT
Start: 2022-01-01 | End: 2022-01-01 | Stop reason: HOSPADM

## 2022-01-01 RX ORDER — ACETAMINOPHEN 325 MG/1
975 TABLET ORAL EVERY 8 HOURS PRN
Status: DISCONTINUED | OUTPATIENT
Start: 2022-01-01 | End: 2022-01-01 | Stop reason: HOSPADM

## 2022-01-01 RX ORDER — ALBUTEROL SULFATE 90 UG/1
2 AEROSOL, METERED RESPIRATORY (INHALATION) EVERY 6 HOURS PRN
Status: DISCONTINUED | OUTPATIENT
Start: 2022-01-01 | End: 2022-01-01 | Stop reason: HOSPADM

## 2022-01-01 RX ORDER — ONDANSETRON 2 MG/ML
4 INJECTION INTRAMUSCULAR; INTRAVENOUS EVERY 6 HOURS PRN
Status: DISCONTINUED | OUTPATIENT
Start: 2022-01-01 | End: 2022-01-01 | Stop reason: HOSPADM

## 2022-01-01 RX ORDER — ACETAMINOPHEN 325 MG/1
975 TABLET ORAL EVERY 8 HOURS
Status: DISCONTINUED | OUTPATIENT
Start: 2022-01-01 | End: 2022-01-01

## 2022-01-01 RX ORDER — OXYCODONE AND ACETAMINOPHEN 5; 325 MG/1; MG/1
1 TABLET ORAL EVERY 6 HOURS PRN
Status: DISCONTINUED | OUTPATIENT
Start: 2022-01-01 | End: 2022-01-01 | Stop reason: HOSPADM

## 2022-01-01 RX ORDER — HYDROMORPHONE HCL IN WATER/PF 6 MG/30 ML
0.2 PATIENT CONTROLLED ANALGESIA SYRINGE INTRAVENOUS
Status: DISCONTINUED | OUTPATIENT
Start: 2022-01-01 | End: 2022-01-01

## 2022-01-01 RX ORDER — LEVETIRACETAM 500 MG/1
500 TABLET ORAL 2 TIMES DAILY
Qty: 10 TABLET | Refills: 0 | Status: SHIPPED | OUTPATIENT
Start: 2022-01-01 | End: 2023-01-01

## 2022-01-01 RX ORDER — POLYETHYLENE GLYCOL 3350 17 G/17G
17 POWDER, FOR SOLUTION ORAL DAILY PRN
Status: DISCONTINUED | OUTPATIENT
Start: 2022-01-01 | End: 2022-01-01 | Stop reason: HOSPADM

## 2022-01-01 RX ORDER — HYDRALAZINE HYDROCHLORIDE 20 MG/ML
10 INJECTION INTRAMUSCULAR; INTRAVENOUS EVERY 4 HOURS PRN
Status: DISCONTINUED | OUTPATIENT
Start: 2022-01-01 | End: 2022-01-01

## 2022-01-01 RX ORDER — ACETAMINOPHEN 325 MG/1
650 TABLET ORAL EVERY 4 HOURS PRN
Status: DISCONTINUED | OUTPATIENT
Start: 2022-01-01 | End: 2022-01-01

## 2022-01-01 RX ORDER — NALOXONE HYDROCHLORIDE 0.4 MG/ML
0.4 INJECTION, SOLUTION INTRAMUSCULAR; INTRAVENOUS; SUBCUTANEOUS
Status: DISCONTINUED | OUTPATIENT
Start: 2022-01-01 | End: 2022-01-01 | Stop reason: HOSPADM

## 2022-01-01 RX ORDER — ATENOLOL 25 MG/1
25 TABLET ORAL EVERY MORNING
Status: DISCONTINUED | OUTPATIENT
Start: 2022-01-01 | End: 2022-01-01 | Stop reason: HOSPADM

## 2022-01-01 RX ORDER — NALOXONE HYDROCHLORIDE 0.4 MG/ML
0.2 INJECTION, SOLUTION INTRAMUSCULAR; INTRAVENOUS; SUBCUTANEOUS
Status: DISCONTINUED | OUTPATIENT
Start: 2022-01-01 | End: 2022-01-01 | Stop reason: HOSPADM

## 2022-01-01 RX ORDER — HYDROMORPHONE HYDROCHLORIDE 1 MG/ML
0.5 INJECTION, SOLUTION INTRAMUSCULAR; INTRAVENOUS; SUBCUTANEOUS ONCE
Status: COMPLETED | OUTPATIENT
Start: 2022-01-01 | End: 2022-01-01

## 2022-01-01 RX ORDER — GABAPENTIN 300 MG/1
300 CAPSULE ORAL AT BEDTIME
Status: DISCONTINUED | OUTPATIENT
Start: 2022-01-01 | End: 2022-01-01 | Stop reason: HOSPADM

## 2022-01-01 RX ORDER — METHOCARBAMOL 500 MG/1
500 TABLET, FILM COATED ORAL EVERY 6 HOURS PRN
Status: DISCONTINUED | OUTPATIENT
Start: 2022-01-01 | End: 2022-01-01 | Stop reason: HOSPADM

## 2022-01-01 RX ORDER — LEVETIRACETAM 5 MG/ML
500 INJECTION INTRAVASCULAR EVERY 12 HOURS
Status: DISCONTINUED | OUTPATIENT
Start: 2022-01-01 | End: 2022-01-01

## 2022-01-01 RX ORDER — LABETALOL HYDROCHLORIDE 5 MG/ML
10 INJECTION, SOLUTION INTRAVENOUS ONCE
Status: COMPLETED | OUTPATIENT
Start: 2022-01-01 | End: 2022-01-01

## 2022-01-01 RX ORDER — SODIUM CHLORIDE 9 MG/ML
INJECTION, SOLUTION INTRAVENOUS CONTINUOUS
Status: DISCONTINUED | OUTPATIENT
Start: 2022-01-01 | End: 2022-01-01

## 2022-01-01 RX ORDER — IPRATROPIUM BROMIDE AND ALBUTEROL SULFATE 2.5; .5 MG/3ML; MG/3ML
3 SOLUTION RESPIRATORY (INHALATION)
Status: DISCONTINUED | OUTPATIENT
Start: 2022-01-01 | End: 2022-01-01

## 2022-01-01 RX ORDER — LEVETIRACETAM 500 MG/1
500 TABLET ORAL 2 TIMES DAILY
Status: DISCONTINUED | OUTPATIENT
Start: 2022-01-01 | End: 2022-01-01 | Stop reason: HOSPADM

## 2022-01-01 RX ORDER — ONDANSETRON 4 MG/1
4 TABLET, ORALLY DISINTEGRATING ORAL EVERY 6 HOURS PRN
Status: DISCONTINUED | OUTPATIENT
Start: 2022-01-01 | End: 2022-01-01 | Stop reason: HOSPADM

## 2022-01-01 RX ORDER — HYDROCHLOROTHIAZIDE 25 MG/1
25 TABLET ORAL DAILY
Status: ON HOLD | COMMUNITY
End: 2023-01-01

## 2022-01-01 RX ORDER — LABETALOL HYDROCHLORIDE 5 MG/ML
20 INJECTION, SOLUTION INTRAVENOUS ONCE
Status: COMPLETED | OUTPATIENT
Start: 2022-01-01 | End: 2022-01-01

## 2022-01-01 RX ORDER — HYDRALAZINE HYDROCHLORIDE 20 MG/ML
20 INJECTION INTRAMUSCULAR; INTRAVENOUS EVERY 4 HOURS PRN
Status: DISCONTINUED | OUTPATIENT
Start: 2022-01-01 | End: 2022-01-01

## 2022-01-01 RX ORDER — LABETALOL HYDROCHLORIDE 5 MG/ML
10 INJECTION, SOLUTION INTRAVENOUS EVERY 4 HOURS PRN
Status: DISCONTINUED | OUTPATIENT
Start: 2022-01-01 | End: 2022-01-01

## 2022-01-01 RX ORDER — LISINOPRIL 20 MG/1
20 TABLET ORAL EVERY MORNING
Status: DISCONTINUED | OUTPATIENT
Start: 2022-01-01 | End: 2022-01-01 | Stop reason: HOSPADM

## 2022-01-01 RX ORDER — LABETALOL HYDROCHLORIDE 5 MG/ML
10-20 INJECTION, SOLUTION INTRAVENOUS EVERY 4 HOURS PRN
Status: DISCONTINUED | OUTPATIENT
Start: 2022-01-01 | End: 2022-01-01

## 2022-01-01 RX ORDER — BISACODYL 10 MG
10 SUPPOSITORY, RECTAL RECTAL DAILY PRN
Status: DISCONTINUED | OUTPATIENT
Start: 2022-01-01 | End: 2022-01-01 | Stop reason: HOSPADM

## 2022-01-01 RX ORDER — PROCHLORPERAZINE MALEATE 5 MG
5 TABLET ORAL EVERY 6 HOURS PRN
Status: DISCONTINUED | OUTPATIENT
Start: 2022-01-01 | End: 2022-01-01 | Stop reason: HOSPADM

## 2022-01-01 RX ORDER — ALBUTEROL SULFATE 0.83 MG/ML
2.5 SOLUTION RESPIRATORY (INHALATION) EVERY 4 HOURS PRN
Status: DISCONTINUED | OUTPATIENT
Start: 2022-01-01 | End: 2022-01-01 | Stop reason: HOSPADM

## 2022-01-01 RX ORDER — ATENOLOL 25 MG/1
25 TABLET ORAL ONCE
Status: COMPLETED | OUTPATIENT
Start: 2022-01-01 | End: 2022-01-01

## 2022-01-01 RX ORDER — HYDRALAZINE HYDROCHLORIDE 20 MG/ML
20 INJECTION INTRAMUSCULAR; INTRAVENOUS EVERY 4 HOURS PRN
Status: DISCONTINUED | OUTPATIENT
Start: 2022-01-01 | End: 2022-01-01 | Stop reason: HOSPADM

## 2022-01-01 RX ORDER — PROCHLORPERAZINE 25 MG
12.5 SUPPOSITORY, RECTAL RECTAL EVERY 12 HOURS PRN
Status: DISCONTINUED | OUTPATIENT
Start: 2022-01-01 | End: 2022-01-01 | Stop reason: HOSPADM

## 2022-01-01 RX ADMIN — HYDRALAZINE HYDROCHLORIDE 20 MG: 20 INJECTION INTRAMUSCULAR; INTRAVENOUS at 01:05

## 2022-01-01 RX ADMIN — ATENOLOL 25 MG: 25 TABLET ORAL at 13:39

## 2022-01-01 RX ADMIN — LEVETIRACETAM 500 MG: 5 INJECTION, SOLUTION INTRAVENOUS at 05:18

## 2022-01-01 RX ADMIN — METHOCARBAMOL 500 MG: 500 TABLET, FILM COATED ORAL at 05:02

## 2022-01-01 RX ADMIN — SODIUM CHLORIDE: 9 INJECTION, SOLUTION INTRAVENOUS at 09:23

## 2022-01-01 RX ADMIN — ATENOLOL 25 MG: 25 TABLET ORAL at 05:18

## 2022-01-01 RX ADMIN — POTASSIUM & SODIUM PHOSPHATES POWDER PACK 280-160-250 MG 2 PACKET: 280-160-250 PACK at 08:03

## 2022-01-01 RX ADMIN — ATENOLOL 25 MG: 25 TABLET ORAL at 07:20

## 2022-01-01 RX ADMIN — OXYCODONE HYDROCHLORIDE AND ACETAMINOPHEN 1 TABLET: 5; 325 TABLET ORAL at 23:39

## 2022-01-01 RX ADMIN — HYDRALAZINE HYDROCHLORIDE 20 MG: 20 INJECTION INTRAMUSCULAR; INTRAVENOUS at 18:22

## 2022-01-01 RX ADMIN — LABETALOL HYDROCHLORIDE 20 MG: 5 INJECTION, SOLUTION INTRAVENOUS at 16:57

## 2022-01-01 RX ADMIN — LISINOPRIL 20 MG: 20 TABLET ORAL at 07:20

## 2022-01-01 RX ADMIN — ACETAMINOPHEN 975 MG: 325 TABLET, FILM COATED ORAL at 03:55

## 2022-01-01 RX ADMIN — ACETAMINOPHEN 975 MG: 325 TABLET, FILM COATED ORAL at 16:26

## 2022-01-01 RX ADMIN — ACETAMINOPHEN 975 MG: 325 TABLET, FILM COATED ORAL at 15:27

## 2022-01-01 RX ADMIN — UMECLIDINIUM 1 PUFF: 62.5 AEROSOL, POWDER ORAL at 07:20

## 2022-01-01 RX ADMIN — IPRATROPIUM BROMIDE AND ALBUTEROL SULFATE 3 ML: .5; 3 SOLUTION RESPIRATORY (INHALATION) at 11:12

## 2022-01-01 RX ADMIN — LEVETIRACETAM 500 MG: 5 INJECTION, SOLUTION INTRAVENOUS at 16:55

## 2022-01-01 RX ADMIN — LABETALOL HYDROCHLORIDE 10 MG: 5 INJECTION, SOLUTION INTRAVENOUS at 07:11

## 2022-01-01 RX ADMIN — HYDRALAZINE HYDROCHLORIDE 20 MG: 20 INJECTION INTRAMUSCULAR; INTRAVENOUS at 10:18

## 2022-01-01 RX ADMIN — LABETALOL HYDROCHLORIDE 10 MG: 5 INJECTION, SOLUTION INTRAVENOUS at 11:26

## 2022-01-01 RX ADMIN — ACETAMINOPHEN 975 MG: 325 TABLET, FILM COATED ORAL at 23:56

## 2022-01-01 RX ADMIN — LABETALOL HYDROCHLORIDE 10 MG: 5 INJECTION, SOLUTION INTRAVENOUS at 09:29

## 2022-01-01 RX ADMIN — ACETAMINOPHEN 975 MG: 325 TABLET, FILM COATED ORAL at 08:03

## 2022-01-01 RX ADMIN — HYDROMORPHONE HYDROCHLORIDE 0.5 MG: 1 INJECTION, SOLUTION INTRAMUSCULAR; INTRAVENOUS; SUBCUTANEOUS at 06:49

## 2022-01-01 RX ADMIN — LISINOPRIL 20 MG: 20 TABLET ORAL at 08:15

## 2022-01-01 RX ADMIN — LEVETIRACETAM 500 MG: 500 TABLET, FILM COATED ORAL at 18:22

## 2022-01-01 RX ADMIN — GABAPENTIN 300 MG: 300 CAPSULE ORAL at 23:39

## 2022-01-01 RX ADMIN — METHOCARBAMOL 500 MG: 500 TABLET, FILM COATED ORAL at 22:47

## 2022-01-01 RX ADMIN — METHOCARBAMOL 500 MG: 500 TABLET, FILM COATED ORAL at 20:35

## 2022-01-01 RX ADMIN — SODIUM CHLORIDE: 9 INJECTION, SOLUTION INTRAVENOUS at 05:18

## 2022-01-01 RX ADMIN — POTASSIUM & SODIUM PHOSPHATES POWDER PACK 280-160-250 MG 2 PACKET: 280-160-250 PACK at 12:23

## 2022-01-01 RX ADMIN — HYDRALAZINE HYDROCHLORIDE 10 MG: 20 INJECTION INTRAMUSCULAR; INTRAVENOUS at 09:18

## 2022-01-01 RX ADMIN — LEVETIRACETAM 500 MG: 500 TABLET, FILM COATED ORAL at 07:20

## 2022-01-01 RX ADMIN — POTASSIUM & SODIUM PHOSPHATES POWDER PACK 280-160-250 MG 2 PACKET: 280-160-250 PACK at 15:26

## 2022-01-01 RX ADMIN — UMECLIDINIUM 1 PUFF: 62.5 AEROSOL, POWDER ORAL at 12:23

## 2022-01-01 RX ADMIN — HYDRALAZINE HYDROCHLORIDE 20 MG: 20 INJECTION INTRAMUSCULAR; INTRAVENOUS at 04:25

## 2022-01-01 RX ADMIN — HYDRALAZINE HYDROCHLORIDE 20 MG: 20 INJECTION INTRAMUSCULAR; INTRAVENOUS at 20:32

## 2022-01-01 RX ADMIN — LABETALOL HYDROCHLORIDE 20 MG: 5 INJECTION, SOLUTION INTRAVENOUS at 00:35

## 2022-01-01 RX ADMIN — HYDRALAZINE HYDROCHLORIDE 10 MG: 20 INJECTION INTRAMUSCULAR; INTRAVENOUS at 18:12

## 2022-01-01 RX ADMIN — UMECLIDINIUM 1 PUFF: 62.5 AEROSOL, POWDER ORAL at 16:29

## 2022-01-01 RX ADMIN — LEVETIRACETAM 1000 MG: 100 INJECTION, SOLUTION INTRAVENOUS at 05:35

## 2022-01-01 ASSESSMENT — ACTIVITIES OF DAILY LIVING (ADL)
ADLS_ACUITY_SCORE: 35
ADLS_ACUITY_SCORE: 35
ADLS_ACUITY_SCORE: 39
EQUIPMENT_CURRENTLY_USED_AT_HOME: WALKER, ROLLING
ADLS_ACUITY_SCORE: 39
DRESSING/BATHING_DIFFICULTY: NO
ADLS_ACUITY_SCORE: 27
ADLS_ACUITY_SCORE: 43
TOILETING_ASSISTANCE: TOILETING DIFFICULTY, ASSISTANCE 1 PERSON;TOILETING DIFFICULTY, REQUIRES EQUIPMENT
ADLS_ACUITY_SCORE: 39
ADLS_ACUITY_SCORE: 43
ADLS_ACUITY_SCORE: 35
ADLS_ACUITY_SCORE: 39
ADLS_ACUITY_SCORE: 39
ADLS_ACUITY_SCORE: 35
CHANGE_IN_FUNCTIONAL_STATUS_SINCE_ONSET_OF_CURRENT_ILLNESS/INJURY: YES
DOING_ERRANDS_INDEPENDENTLY_DIFFICULTY: YES
ADLS_ACUITY_SCORE: 35
ADLS_ACUITY_SCORE: 39
ADLS_ACUITY_SCORE: 35
CONCENTRATING,_REMEMBERING_OR_MAKING_DECISIONS_DIFFICULTY: YES
ADLS_ACUITY_SCORE: 39
ADLS_ACUITY_SCORE: 39
NUMBER_OF_TIMES_PATIENT_HAS_FALLEN_WITHIN_LAST_SIX_MONTHS: 6
ADLS_ACUITY_SCORE: 35
ADLS_ACUITY_SCORE: 27
ADLS_ACUITY_SCORE: 43
DEPENDENT_IADLS:: INDEPENDENT
ADLS_ACUITY_SCORE: 35
WEAR_GLASSES_OR_BLIND: NO
FALL_HISTORY_WITHIN_LAST_SIX_MONTHS: YES
ADLS_ACUITY_SCORE: 35
ADLS_ACUITY_SCORE: 39
ADLS_ACUITY_SCORE: 39
ADLS_ACUITY_SCORE: 35
DIFFICULTY_EATING/SWALLOWING: NO
TOILETING_ISSUES: YES
WALKING_OR_CLIMBING_STAIRS_DIFFICULTY: NO
ADLS_ACUITY_SCORE: 43
ADLS_ACUITY_SCORE: 39
ADLS_ACUITY_SCORE: 35
ADLS_ACUITY_SCORE: 39
TOILETING: 1-->ASSISTANCE (EQUIPMENT/PERSON) NEEDED
TOILETING: 1-->ASSISTANCE (EQUIPMENT/PERSON) NEEDED (NOT DEVELOPMENTALLY APPROPRIATE)

## 2022-01-01 ASSESSMENT — ENCOUNTER SYMPTOMS
FREQUENCY: 0
DIZZINESS: 0
ABDOMINAL PAIN: 0
HEMATURIA: 0
NECK PAIN: 0
VOMITING: 0
HEADACHES: 1
TREMORS: 1
LIGHT-HEADEDNESS: 0
DYSURIA: 0
WEAKNESS: 1
APPETITE CHANGE: 1

## 2022-01-01 ASSESSMENT — PAIN SCALES - GENERAL: PAINLEVEL: SEVERE PAIN (7)

## 2022-03-02 ENCOUNTER — TRANSFERRED RECORDS (OUTPATIENT)
Dept: HEALTH INFORMATION MANAGEMENT | Facility: CLINIC | Age: 82
End: 2022-03-02
Payer: MEDICARE

## 2022-03-16 ENCOUNTER — TRANSFERRED RECORDS (OUTPATIENT)
Dept: HEALTH INFORMATION MANAGEMENT | Facility: CLINIC | Age: 82
End: 2022-03-16
Payer: MEDICARE

## 2022-04-22 ENCOUNTER — MEDICAL CORRESPONDENCE (OUTPATIENT)
Dept: HEALTH INFORMATION MANAGEMENT | Facility: CLINIC | Age: 82
End: 2022-04-22
Payer: MEDICARE

## 2022-04-25 ENCOUNTER — TRANSCRIBE ORDERS (OUTPATIENT)
Dept: OTHER | Age: 82
End: 2022-04-25

## 2022-04-25 DIAGNOSIS — D49.519 KIDNEY TUMOR: Primary | ICD-10-CM

## 2022-05-04 ENCOUNTER — TELEPHONE (OUTPATIENT)
Dept: ONCOLOGY | Facility: CLINIC | Age: 82
End: 2022-05-04
Payer: MEDICARE

## 2022-05-04 NOTE — TELEPHONE ENCOUNTER
Health Call Center    Phone Message    May a detailed message be left on voicemail: yes     Reason for Call: Symptoms or Concerns     If patient has red-flag symptoms, warm transfer to triage line    Current symptom or concern: pain in kidney area where he has a kidney mass    Symptoms have been present for:  6 month(s)    Has patient previously been seen for this? No    By : N/A    Date: N/A    Are there any new or worsening symptoms? Yes: Patient states his pain is getting worse.  Patient is scheduled for appt with Dr. Erickson on 05/24/22 and is asking for an earlier appt due to pain.  Please reach out to patient.  Thank you.      Action Taken: Other: Urology    Travel Screening: Not Applicable

## 2022-05-04 NOTE — TELEPHONE ENCOUNTER
Called Soto to follow up about arranging an earlier consult with Dr. Erickson as he is experiencing more discomfort  In his abdomen where he has kidney mass. Relayed that we can advance his appointment from 5/24 to Tuesday, 5/10/22 check in at 0945 with Dr. Erickson. Directions were given to our facility. Shared that since he is not an established patient yet with Dr. Erickson, writer cannot triage his symptoms of pain. He was directed to PCP/referring provider to manage any symptoms prior to his consult. He expressed understanding  and appreciation. /Susan Mccloud RN

## 2022-05-10 ENCOUNTER — OFFICE VISIT (OUTPATIENT)
Dept: ONCOLOGY | Facility: CLINIC | Age: 82
End: 2022-05-10
Attending: FAMILY MEDICINE
Payer: MEDICARE

## 2022-05-10 VITALS
SYSTOLIC BLOOD PRESSURE: 171 MMHG | WEIGHT: 277.5 LBS | HEIGHT: 75 IN | DIASTOLIC BLOOD PRESSURE: 78 MMHG | OXYGEN SATURATION: 90 % | RESPIRATION RATE: 20 BRPM | HEART RATE: 65 BPM | BODY MASS INDEX: 34.5 KG/M2

## 2022-05-10 DIAGNOSIS — N28.89 RIGHT RENAL MASS: Primary | ICD-10-CM

## 2022-05-10 DIAGNOSIS — J43.9 PULMONARY EMPHYSEMA, UNSPECIFIED EMPHYSEMA TYPE (H): ICD-10-CM

## 2022-05-10 DIAGNOSIS — N18.30 STAGE 3 CHRONIC KIDNEY DISEASE, UNSPECIFIED WHETHER STAGE 3A OR 3B CKD (H): ICD-10-CM

## 2022-05-10 PROBLEM — E11.9 DIABETES MELLITUS, TYPE 2 (H): Status: ACTIVE | Noted: 2022-05-10

## 2022-05-10 PROBLEM — N18.31 CHRONIC KIDNEY DISEASE, STAGE 3A (H): Status: ACTIVE | Noted: 2022-05-10

## 2022-05-10 PROCEDURE — 99204 OFFICE O/P NEW MOD 45 MIN: CPT | Performed by: STUDENT IN AN ORGANIZED HEALTH CARE EDUCATION/TRAINING PROGRAM

## 2022-05-10 PROCEDURE — G0463 HOSPITAL OUTPT CLINIC VISIT: HCPCS

## 2022-05-10 RX ORDER — GABAPENTIN 300 MG/1
300 CAPSULE ORAL 2 TIMES DAILY PRN
Status: ON HOLD | COMMUNITY
End: 2023-01-01

## 2022-05-10 ASSESSMENT — PAIN SCALES - GENERAL: PAINLEVEL: EXTREME PAIN (9)

## 2022-05-10 NOTE — PROGRESS NOTES
"Urology Rooming Note    May 10, 2022 10:11 AM   Soto Gibbs is a 82 year old male who presents for:    Chief Complaint   Patient presents with     Urology     Initial Vitals: BP (!) 171/78   Pulse 65   Resp 20   Ht 1.905 m (6' 3\")   Wt 125.9 kg (277 lb 8 oz)   SpO2 90%   BMI 34.69 kg/m   Estimated body mass index is 34.69 kg/m  as calculated from the following:    Height as of this encounter: 1.905 m (6' 3\").    Weight as of this encounter: 125.9 kg (277 lb 8 oz). Body surface area is 2.58 meters squared.  Extreme Pain (9) Comment: Data Unavailable     Allergies reviewed: Yes  Medications reviewed: Yes    Medications: Medication refills not needed today.  Pharmacy name entered into [facility]: [id number] - [place], [place] - [address]  "

## 2022-05-10 NOTE — PROGRESS NOTES
Chief Complaint:    Renal Mass           Consult or Referral:     Mr. Soto Gibbs is a 82 year old male seen at the request of Dr. Lloyd.         History of Present Illness:     Soto Gibbs is a 82 year old male being seen for right renal mass.  Duration of problem: 2 months  Previous treatments: None     accompanied by his daughter.  Reviewed previous notes from Dr. Lloyd    Soto presents today to discuss management for his recently discovered right renal mass  He has complains of right flank discomfort/pain and he was evaluated recently and found to have a 5 cm right renal mass  He has a significant history of chronic obstructive pulmonary disease/emphysema for which he is on domiciliary oxygen treatment  He has very limited physical activity and poor exercise tolerance and possibly poor performance status             Past Medical History:   No past medical history on file.         Past Surgical History:     Past Surgical History:   Procedure Laterality Date     ABDOMEN SURGERY      Hernia     COLON SURGERY      Polyps.            Medications     Current Outpatient Medications   Medication     albuterol (PROVENTIL HFA;VENTOLIN HFA) 90 mcg/actuation inhaler     albuterol (PROVENTIL) 2.5 mg /3 mL (0.083 %) nebulizer solution     aspirin 81 MG EC tablet     atenolol (TENORMIN) 25 MG tablet     gabapentin (NEURONTIN) 300 MG capsule     lisinopril (PRINIVIL,ZESTRIL) 20 MG tablet     oxyCODONE-acetaminophen (PERCOCET) 5-325 mg per tablet     OXYGEN-AIR DELIVERY SYSTEMS MISC     tiotropium (SPIRIVA) 18 mcg inhalation capsule     triamterene-hydrochlorothiazide (DYAZIDE) 37.5-25 mg per capsule     omeprazole (PRILOSEC) 20 MG capsule     simvastatin (ZOCOR) 20 MG tablet     No current facility-administered medications for this visit.            Family History:   No family history on file.         Social History:     Social History     Socioeconomic History     Marital status: Single     Spouse name: Not on file  "    Number of children: Not on file     Years of education: Not on file     Highest education level: Not on file   Occupational History     Not on file   Tobacco Use     Smoking status: Former Smoker     Quit date: 1985     Years since quittin.2     Smokeless tobacco: Never Used   Substance and Sexual Activity     Alcohol use: No     Drug use: No     Sexual activity: Not on file   Other Topics Concern     Not on file   Social History Narrative     Not on file     Social Determinants of Health     Financial Resource Strain: Not on file   Food Insecurity: Not on file   Transportation Needs: Not on file   Physical Activity: Not on file   Stress: Not on file   Social Connections: Not on file   Intimate Partner Violence: Not on file   Housing Stability: Not on file            Allergies:   Morphine (pf) [morphine]         Review of Systems:  From intake questionnaire     Skin: negative  Eyes: negative  Ears/Nose/Throat: negative  Respiratory: No shortness of breath, dyspnea on exertion, cough, or hemoptysis  Cardiovascular: No chest pain or palpitations  Gastrointestinal: negative; no nausea/vomiting, constipation or diarrhea  Genitourinary: as per HPI  Musculoskeletal: negative  Neurologic: negative  Psychiatric: negative  Hematologic/Lymphatic/Immunologic: negative  Endocrine: negative         Physical Exam:     Patient is a 82 year old  male   Vitals: Blood pressure (!) 171/78, pulse 65, resp. rate 20, height 1.905 m (6' 3\"), weight 125.9 kg (277 lb 8 oz), SpO2 90 %.  Constitutional: Body mass index is 34.69 kg/m .  Alert, no acute distress, oriented, conversant  Eyes: no scleral icterus; extraocular muscles intact, moist conjunctivae  Neck: trachea midline, no thyromegaly  Ears/nose/mouth: throat/mouth:normal, good dentition  Respiratory: no respiratory distress, or pursed lip breathing  Cardiovascular: pulses strong and intact; no obvious jugular venous distension present  Gastrointestinal: soft, nontender, " no organomegaly or masses,   Lymphatics: No inguinal adenopathy  Musculoskeletal: extremities normal, no peripheral edema  Skin: no suspicious lesions or rashes  Neuro: Alert, oriented, speech and mentation normal  Psych: affect and mood normal, alert and oriented to person, place and time  Gait: Normal  : Not done      Labs and Pathology:    The following labs were reviewed by me and discussed with the patient:    Significant for   Lab Results   Component Value Date    CR 1.85 12/03/2021    CR 1.77 06/28/2021    CR 1.66 06/08/2021    CR 2.02 05/28/2021    CR 2.07 05/15/2020    CR 1.57 03/27/2019    CR 1.75 02/28/2018     Prostate Specific Antigen Screen   Date Value Ref Range Status   12/03/2021 6.45 0.00 - 6.50 ug/L Final   03/27/2019 5.3 0.0 - 6.5 ng/mL Final   11/01/2016 3.1 0.0 - 6.5 ng/mL Final   09/28/2015 2.6 0.0 - 6.5 ng/mL Final             Imaging:    The following imaging exams were independently viewed and interpreted by me and discussed with patient:  CT Scan chest: Abnormal: Right renal mass 4.9 cm  MRI Abd/Pelvis: Abnormal: Renal mass 5 cm in size  These reports for reviewed but we did not have the actual imaging at the time of patient evaluation and have asked for the primary resulting agency to provide the images for further discussion             Assessment and Plan:     Right renal mass  Discussed about the significance of the right renal mass and the fact that it likely represents a malignant tumor considering its size  Since we do not have the actual imaging to review I would not be able to discuss further options of management with the specificity that we would need  However I discussed with them the common options of management of renal mass which is 5 cm in size and this would include possible partial nephrectomy, radical nephrectomy and may be an option for thermal ablation like cryotherapy.  We discussed this with specific concerns regarding his CKD status, his chronic obstructive  pulmonary disease with emphysema and long-term domiciliary oxygen use and the fact that surgery with general anesthesia may have significant risk of worsening his current lung condition.  We also discussed about the fact that partial nephrectomy if possible would be the most ideal way to manage the tumor based on what we find on actual evaluation of the imaging, however, it still is more complicated in terms of likely complications and need for interventions considering bleeding/need for embolization/conversion to radical nephrectomy as well as possible interventions in the event of urinary leak.  Radical nephrectomy likely the safer option but may be associated with worsening renal function considering the fact that he is already a CKD 3  We also discussed about cryotherapy and the fact that the tumor is slightly above the threshold for limits of cryotherapy and we would also want to see the location and the accessibility of the tumor for feasibility for cryotherapy.  Having said this I would need to look at the imaging and he if he is deemed feasible for cryotherapy I would want to [facility] with [facility] along with interventional radiology for the same.    Stage 3 chronic kidney disease, unspecified whether stage 3a or 3b CKD (H)  CKD stage III possibly 3A or 3B  Most likely would benefit from a partial nephrectomy/cryotherapy    Pulmonary emphysema, unspecified emphysema type (H)  Status stable as per pulmonology  Would want him to touch base with his pulmonologist to review his risks of undergoing general anesthesia related procedure for the right kidney mass        Plan:  Collect CT and MRI results  Soto to touch base with his pulmonologist for surgical risk estimation  Touch base with me in 3 to 4 weeks to discuss final plan of action      Orders  No orders of the defined types were placed in this encounter.      Jose Roberto Erickson MD  [facility]  [place]      ==========================    Additional Billing and Coding Information:  Review of external notes as documented above   Review of the result(s) of each unique test - CT chest, MRI abdomen, PSA, creatinine    Independent interpretation of a test performed by another physician/other qualified health care professional (not separately reported) -       Discussion of management or test interpretation with external physician/other qualified healthcare professional/appropriate source -       Diagnosis or treatment significantly limited by social determinants of health -       30 minutes spent on the date of the encounter doing chart review, review of test results, interpretation of tests, patient visit and documentation and discussion with family    ==========================

## 2022-05-10 NOTE — LETTER
"    5/10/2022         RE: Soto Gibbs  [address] [address]        Dear Colleague,    Thank you for referring your patient, Soto Gibbs, to the [facility]. Please see a copy of my visit note below.    Urology Rooming Note    May 10, 2022 10:11 AM   Soto Gibbs is a 82 year old male who presents for:    Chief Complaint   Patient presents with     Urology     Initial Vitals: BP (!) 171/78   Pulse 65   Resp 20   Ht 1.905 m (6' 3\")   Wt 125.9 kg (277 lb 8 oz)   SpO2 90%   BMI 34.69 kg/m   Estimated body mass index is 34.69 kg/m  as calculated from the following:    Height as of this encounter: 1.905 m (6' 3\").    Weight as of this encounter: 125.9 kg (277 lb 8 oz). Body surface area is 2.58 meters squared.  Extreme Pain (9) Comment: Data Unavailable     Allergies reviewed: Yes  Medications reviewed: Yes    Medications: Medication refills not needed today.  Pharmacy name entered into [facility]: [id number] - [place], [place] - [address] BEFR[address]ICK Rangel LPN          Chief Complaint:    Renal Mass           Consult or Referral:     Mr. Soto Gibbs is a 82 year old male seen at the request of Dr. Lloyd.         History of Present Illness:     Soto Gibbs is a 82 year old male being seen for right renal mass.  Duration of problem: 2 months  Previous treatments: None     accompanied by his daughter.  Reviewed previous notes from Dr. Lloyd    Soto presents today to discuss management for his recently discovered right renal mass  He has complains of right flank discomfort/pain and he was evaluated recently and found to have a 5 cm right renal mass  He has a significant history of chronic obstructive pulmonary disease/emphysema for which he is on domiciliary oxygen treatment  He has very limited physical activity and poor exercise tolerance and possibly poor performance status             Past Medical History:   No past medical history " on file.         Past Surgical History:     Past Surgical History:   Procedure Laterality Date     ABDOMEN SURGERY      Hernia     COLON SURGERY      Polyps.            Medications     Current Outpatient Medications   Medication     albuterol (PROVENTIL HFA;VENTOLIN HFA) 90 mcg/actuation inhaler     albuterol (PROVENTIL) 2.5 mg /3 mL (0.083 %) nebulizer solution     aspirin 81 MG EC tablet     atenolol (TENORMIN) 25 MG tablet     gabapentin (NEURONTIN) 300 MG capsule     lisinopril (PRINIVIL,ZESTRIL) 20 MG tablet     oxyCODONE-acetaminophen (PERCOCET) 5-325 mg per tablet     OXYGEN-AIR DELIVERY SYSTEMS MISC     tiotropium (SPIRIVA) 18 mcg inhalation capsule     triamterene-hydrochlorothiazide (DYAZIDE) 37.5-25 mg per capsule     omeprazole (PRILOSEC) 20 MG capsule     simvastatin (ZOCOR) 20 MG tablet     No current facility-administered medications for this visit.            Family History:   No family history on file.         Social History:     Social History     Socioeconomic History     Marital status: Single     Spouse name: Not on file     Number of children: Not on file     Years of education: Not on file     Highest education level: Not on file   Occupational History     Not on file   Tobacco Use     Smoking status: Former Smoker     Quit date: 1985     Years since quittin.2     Smokeless tobacco: Never Used   Substance and Sexual Activity     Alcohol use: No     Drug use: No     Sexual activity: Not on file   Other Topics Concern     Not on file   Social History Narrative     Not on file     Social Determinants of Health     Financial Resource Strain: Not on file   Food Insecurity: Not on file   Transportation Needs: Not on file   Physical Activity: Not on file   Stress: Not on file   Social Connections: Not on file   Intimate Partner Violence: Not on file   Housing Stability: Not on file            Allergies:   Morphine (pf) [morphine]         Review of Systems:  From intake questionnaire  "    Skin: negative  Eyes: negative  Ears/Nose/Throat: negative  Respiratory: No shortness of breath, dyspnea on exertion, cough, or hemoptysis  Cardiovascular: No chest pain or palpitations  Gastrointestinal: negative; no nausea/vomiting, constipation or diarrhea  Genitourinary: as per HPI  Musculoskeletal: negative  Neurologic: negative  Psychiatric: negative  Hematologic/Lymphatic/Immunologic: negative  Endocrine: negative         Physical Exam:     Patient is a 82 year old  male   Vitals: Blood pressure (!) 171/78, pulse 65, resp. rate 20, height 1.905 m (6' 3\"), weight 125.9 kg (277 lb 8 oz), SpO2 90 %.  Constitutional: Body mass index is 34.69 kg/m .  Alert, no acute distress, oriented, conversant  Eyes: no scleral icterus; extraocular muscles intact, moist conjunctivae  Neck: trachea midline, no thyromegaly  Ears/nose/mouth: throat/mouth:normal, good dentition  Respiratory: no respiratory distress, or pursed lip breathing  Cardiovascular: pulses strong and intact; no obvious jugular venous distension present  Gastrointestinal: soft, nontender, no organomegaly or masses,   Lymphatics: No inguinal adenopathy  Musculoskeletal: extremities normal, no peripheral edema  Skin: no suspicious lesions or rashes  Neuro: Alert, oriented, speech and mentation normal  Psych: affect and mood normal, alert and oriented to person, place and time  Gait: Normal  : Not done      Labs and Pathology:    The following labs were reviewed by me and discussed with the patient:    Significant for   Lab Results   Component Value Date    CR 1.85 12/03/2021    CR 1.77 06/28/2021    CR 1.66 06/08/2021    CR 2.02 05/28/2021    CR 2.07 05/15/2020    CR 1.57 03/27/2019    CR 1.75 02/28/2018     Prostate Specific Antigen Screen   Date Value Ref Range Status   12/03/2021 6.45 0.00 - 6.50 ug/L Final   03/27/2019 5.3 0.0 - 6.5 ng/mL Final   11/01/2016 3.1 0.0 - 6.5 ng/mL Final   09/28/2015 2.6 0.0 - 6.5 ng/mL Final             Imaging:    The " following imaging exams were independently viewed and interpreted by me and discussed with patient:  CT Scan chest: Abnormal: Right renal mass 4.9 cm  MRI Abd/Pelvis: Abnormal: Renal mass 5 cm in size  These reports for reviewed but we did not have the actual imaging at the time of patient evaluation and have asked for the primary resulting agency to provide the images for further discussion             Assessment and Plan:     Right renal mass  Discussed about the significance of the right renal mass and the fact that it likely represents a malignant tumor considering its size  Since we do not have the actual imaging to review I would not be able to discuss further options of management with the specificity that we would need  However I discussed with them the common options of management of renal mass which is 5 cm in size and this would include possible partial nephrectomy, radical nephrectomy and may be an option for thermal ablation like cryotherapy.  We discussed this with specific concerns regarding his CKD status, his chronic obstructive pulmonary disease with emphysema and long-term domiciliary oxygen use and the fact that surgery with general anesthesia may have significant risk of worsening his current lung condition.  We also discussed about the fact that partial nephrectomy if possible would be the most ideal way to manage the tumor based on what we find on actual evaluation of the imaging, however, it still is more complicated in terms of likely complications and need for interventions considering bleeding/need for embolization/conversion to radical nephrectomy as well as possible interventions in the event of urinary leak.  Radical nephrectomy likely the safer option but may be associated with worsening renal function considering the fact that he is already a CKD 3  We also discussed about cryotherapy and the fact that the tumor is slightly above the threshold for limits of cryotherapy and we would  also want to see the location and the accessibility of the tumor for feasibility for cryotherapy.  Having said this I would need to look at the imaging and he if he is deemed feasible for cryotherapy I would want to discuss this [facility] with [facility]otherapy along with interventional radiology for the same.    Stage 3 chronic kidney disease, unspecified whether stage 3a or 3b CKD (H)  CKD stage III possibly 3A or 3B  Most likely would benefit from a partial nephrectomy/cryotherapy    Pulmonary emphysema, unspecified emphysema type (H)  Status stable as per pulmonology  Would want him to touch base with his pulmonologist to review his risks of undergoing general anesthesia related procedure for the right kidney mass        Plan:  Collect CT and MRI results  Soto to touch base with his pulmonologist for surgical risk estimation  Touch base with me in 3 to 4 weeks to discuss final plan of action      Orders  No orders of the defined types were placed in this encounter.      Jose Roberto Erickson MD  [facility]      ==========================    Additional Billing and Coding Information:  Review of external notes as documented above   Review of the result(s) of each unique test - CT chest, MRI abdomen, PSA, creatinine    Independent interpretation of a test performed by another physician/other qualified health care professional (not separately reported) -       Discussion of management or test interpretation with external physician/other qualified healthcare professional/appropriate source -       Diagnosis or treatment significantly limited by social determinants of health -       30 minutes spent on the date of the encounter doing chart review, review of test results, interpretation of tests, patient visit and documentation and discussion with family    ==========================      Again, thank you for allowing me to participate in the care of your patient.         Sincerely,        Jose Roberto Erickson MD

## 2022-05-10 NOTE — PATIENT INSTRUCTIONS
To review with Pulmonology for feasibility of general anesthesia  Please have the images available on PACS  Follow-up with me in 3-4 weeks     Implemented All Universal Safety Interventions:  [place] to call system. Call bell, personal items and telephone within reach. Instruct patient to call for assistance. Room bathroom lighting operational. Non-slip footwear when patient is off stretcher. Physically safe environment: no spills, clutter or unnecessary equipment. Stretcher in lowest position, wheels locked, appropriate side rails in place.

## 2022-05-11 ENCOUNTER — DOCUMENTATION ONLY (OUTPATIENT)
Dept: UROLOGY | Facility: CLINIC | Age: 82
End: 2022-05-11
Payer: MEDICARE

## 2022-05-11 NOTE — PROGRESS NOTES
Action May 11, 2022 JTV 3:03 PM    Action Taken CSS resolved a MRI image from [facility] per Nurse Davis's request.    CSS called [facility]. CSS spoke to Jessica and confirmed patient's CT image will be pushed.     Update Nurse Davis once images have been received.     @3:49 PM, CSS received and resolved images from [facility].

## 2022-05-20 ENCOUNTER — MEDICAL CORRESPONDENCE (OUTPATIENT)
Dept: HEALTH INFORMATION MANAGEMENT | Facility: CLINIC | Age: 82
End: 2022-05-20

## 2022-05-20 ENCOUNTER — LAB REQUISITION (OUTPATIENT)
Dept: LAB | Facility: CLINIC | Age: 82
End: 2022-05-20
Payer: MEDICARE

## 2022-05-20 DIAGNOSIS — C64.9 MALIGNANT NEOPLASM OF UNSPECIFIED KIDNEY, EXCEPT RENAL PELVIS (H): ICD-10-CM

## 2022-05-20 DIAGNOSIS — J44.9 CHRONIC OBSTRUCTIVE PULMONARY DISEASE, UNSPECIFIED (H): ICD-10-CM

## 2022-05-20 LAB
ALBUMIN SERPL-MCNC: 3.7 G/DL (ref 3.5–5)
ALP SERPL-CCNC: 66 U/L (ref 45–120)
ALT SERPL W P-5'-P-CCNC: 14 U/L (ref 0–45)
ANION GAP SERPL CALCULATED.3IONS-SCNC: 9 MMOL/L (ref 5–18)
AST SERPL W P-5'-P-CCNC: 17 U/L (ref 0–40)
BASOPHILS # BLD AUTO: 0 10E3/UL (ref 0–0.2)
BASOPHILS NFR BLD AUTO: 0 %
BILIRUB SERPL-MCNC: 0.3 MG/DL (ref 0–1)
BUN SERPL-MCNC: 23 MG/DL (ref 8–28)
CALCIUM SERPL-MCNC: 9.3 MG/DL (ref 8.5–10.5)
CHLORIDE BLD-SCNC: 100 MMOL/L (ref 98–107)
CO2 SERPL-SCNC: 34 MMOL/L (ref 22–31)
CREAT SERPL-MCNC: 1.77 MG/DL (ref 0.7–1.3)
EOSINOPHIL # BLD AUTO: 0 10E3/UL (ref 0–0.7)
EOSINOPHIL NFR BLD AUTO: 1 %
ERYTHROCYTE [DISTWIDTH] IN BLOOD BY AUTOMATED COUNT: 14.7 % (ref 10–15)
GFR SERPL CREATININE-BSD FRML MDRD: 38 ML/MIN/1.73M2
GLUCOSE BLD-MCNC: 100 MG/DL (ref 70–125)
HCT VFR BLD AUTO: 41.9 % (ref 40–53)
HGB BLD-MCNC: 12.6 G/DL (ref 13.3–17.7)
IMM GRANULOCYTES # BLD: 0 10E3/UL
IMM GRANULOCYTES NFR BLD: 0 %
LYMPHOCYTES # BLD AUTO: 2.1 10E3/UL (ref 0.8–5.3)
LYMPHOCYTES NFR BLD AUTO: 31 %
MCH RBC QN AUTO: 28.6 PG (ref 26.5–33)
MCHC RBC AUTO-ENTMCNC: 30.1 G/DL (ref 31.5–36.5)
MCV RBC AUTO: 95 FL (ref 78–100)
MONOCYTES # BLD AUTO: 0.7 10E3/UL (ref 0–1.3)
MONOCYTES NFR BLD AUTO: 10 %
NEUTROPHILS # BLD AUTO: 3.9 10E3/UL (ref 1.6–8.3)
NEUTROPHILS NFR BLD AUTO: 58 %
NRBC # BLD AUTO: 0 10E3/UL
NRBC BLD AUTO-RTO: 0 /100
PLATELET # BLD AUTO: 186 10E3/UL (ref 150–450)
POTASSIUM BLD-SCNC: 4.5 MMOL/L (ref 3.5–5)
PROT SERPL-MCNC: 6.9 G/DL (ref 6–8)
RBC # BLD AUTO: 4.41 10E6/UL (ref 4.4–5.9)
SODIUM SERPL-SCNC: 143 MMOL/L (ref 136–145)
WBC # BLD AUTO: 6.7 10E3/UL (ref 4–11)

## 2022-05-20 PROCEDURE — 80053 COMPREHEN METABOLIC PANEL: CPT | Mod: ORL | Performed by: FAMILY MEDICINE

## 2022-05-20 PROCEDURE — 85025 COMPLETE CBC W/AUTO DIFF WBC: CPT | Mod: ORL | Performed by: FAMILY MEDICINE

## 2022-05-28 ENCOUNTER — TRANSFERRED RECORDS (OUTPATIENT)
Dept: HEALTH INFORMATION MANAGEMENT | Facility: CLINIC | Age: 82
End: 2022-05-28

## 2022-05-31 DIAGNOSIS — J44.9 COPD (CHRONIC OBSTRUCTIVE PULMONARY DISEASE) (H): Primary | ICD-10-CM

## 2022-07-11 ENCOUNTER — TRANSFERRED RECORDS (OUTPATIENT)
Dept: HEALTH INFORMATION MANAGEMENT | Facility: CLINIC | Age: 82
End: 2022-07-11

## 2022-07-22 NOTE — TELEPHONE ENCOUNTER
MEDICAL RECORDS REQUEST   [facility]  Urology Clinic  [address] [place] [address]  PHONE: [phone]  Fax: [phone]        FUTURE VISIT INFORMATION                                                   Soto Gibbs, : 1940 scheduled for future visit at [facility]    APPOINTMENT INFORMATION:    Date: 22    Provider:  Stan    Reason for Visit/Diagnosis: discuss tx of right renal mass    RECORDS REQUESTED FOR VISIT                                                     NOTES  STATUS/DETAILS   OFFICE NOTE from referring provider  yes [facility]   OFFICE NOTE from other specialist  yes 5.10.22 Sim, [facility]   DISCHARGE SUMMARY from hospital  no   DISCHARGE REPORT from the ER  no   OPERATIVE REPORT  no   MEDICATION LIST  yes   LABS     URINALYSIS (UA)  yes   URINE CYTOLOGY  no   Imaging  3 US ab, [facility]       PRE-VISIT CHECKLIST      Record collection complete Yes

## 2022-07-23 ENCOUNTER — HEALTH MAINTENANCE LETTER (OUTPATIENT)
Age: 82
End: 2022-07-23

## 2022-08-04 ENCOUNTER — PRE VISIT (OUTPATIENT)
Dept: UROLOGY | Facility: CLINIC | Age: 82
End: 2022-08-04

## 2022-08-04 NOTE — TELEPHONE ENCOUNTER
Reason for visit: consult     Relevant information: renal mass    Records/imaging/labs/orders: in epic    Pt called: no    At Rooming: virtual

## 2022-09-06 NOTE — PROGRESS NOTES
Patient remains A&Ox4, 2/5 BLE strength, 4/5 BUE strength, pt remains on Bipap 40%, tolerating well, scheduled tylenol given for 'generalized aches and pains' per patient. Pt tolerating diet well. Incontinent x1 episode. PERRLA. Pt on regular hospital bed. PIV with MIVF. Report given to PATRICIA Farah patient transferred with belongings and wife to [facility]. To note: glasses were not at bedside when patient came into hospital, patient is aware and states that his daughter must have these.

## 2022-09-06 NOTE — H&P
[facility]    History and Physical / Consult note: Trauma Service     Date of Admission:  9/6/2022    Time of Admission/Consult Request (page/call): 09/06/2022 @ 0845    Time of my evaluation: 0850am  Consulting services:  Neurosurgery - called by the ED    Assessment & Plan     Trauma mechanism:Mechanical fall  Time/date of injury:09/06 early am  Known Injuries:  1. Right falx 8mm SDH, no MLS  Other diagnoses   # Hx COPD on 2L NC at home  # FORTUNATO  # Falls,weakness  # Right renal mass, CKD 3  # DM2         # Hypoalbuminemia: Albumin = 3.0 g/dL (Ref range: 3.5 - 5.0 g/dL) on admission, will monitor as appropriate     # Hypertension: home medication list includes antihypertensive(s)      Procedure(s):  None  Plan:  1. Neurochecks hourly until stable CT head  2. BIPAP, ABG recheck 1 hour after BIPAP  3.. CT head @ 1030am 6 hours from initial  4. ELISABETH: MIVF ordered, NPO for now, Hold PTA ACEi, thiazide duiretic  5. PRN Hydralazine for SBP >140  6. Hold all chemical DVT prophylaxis or anticoagulation  7. Keppra 500mg BID x7 days  8. BMP later today  9. Planned admission to Intermediate care  10. PRN tylenol for pain, avoid sedating meds until neuro exam is improved or to avoid suppressing neuro exam  Code status: Full code   Family update to follow  General Cares:  GI Prophylaxis: n/a  DVT Prophylaxis: Mechanical   Date of last stool/Bowel Regimen:PTA  Pulmonary toilet:IS    ETOH: This patient was asked if in the last 3-6 months there has been a time when he had  5 or more drinks in a single day/outing.. Patient answer to the screening question was in the negative. No intervention needed.  Primary Care Physician   Dimitris Calderon MD    Chief Complaint   Falls, weak    History is obtained from the patient, electronic health record and emergency department physician    History of Present Illness   Soto Gibbs is a 82 year old male with a PMH significant for COPD on 2L NC at  home, FORTUNATO, right renal mass S/P biopsy 08/28,CKD 3,weakness with multiple recent falls at home. He presented as a transfer from [facility] with a right falx 8mm SDH. He takes an 81mg of ASA daily, no other anticoagulation. Per chart review, multiple falls over the last few days most recently he fell and struck his head on a table.    He received 1gm of Keppra @ 0530am and 0.5mg Hydromorphone prior to transfer.    On arrival he is lethargic, oriented x 3 when awake (GCS 14,, -1 for alertness), reports pain to the left shoulder and  Wrist. He states I hurt everywhere. Denies nausea.    PTA labs: Na 142, creat 2.07, Hemoglobin 10.8, plt 280, INR 1.10  ABG @ 0530 7.30/89/24/36 2LNC    Past Medical History    I have reviewed this patient's medical history and updated it with pertinent information if needed.   No past medical history on file.    Past Surgical History   I have reviewed this patient's surgical history and updated it with pertinent information if needed.  Past Surgical History:   Procedure Laterality Date     ABDOMEN SURGERY      Hernia     COLON SURGERY      Polyps.     Prior to Admission Medications   Prior to Admission Medications   Prescriptions Last Dose Informant Patient Reported? Taking?   OXYGEN-AIR DELIVERY SYSTEMS MISC   Yes No   Sig: [OXYGEN-AIR DELIVERY SYSTEMS MISC] Inhale 2 L/min continuous. Indications: copd, sleep apnea   albuterol (PROVENTIL HFA;VENTOLIN HFA) 90 mcg/actuation inhaler   No No   Sig: [ALBUTEROL (PROVENTIL HFA;VENTOLIN HFA) 90 MCG/ACTUATION INHALER] Inhale 2 puffs every 6 (six) hours as needed for wheezing or shortness of breath.   albuterol (PROVENTIL) 2.5 mg /3 mL (0.083 %) nebulizer solution   No No   Sig: [ALBUTEROL (PROVENTIL) 2.5 MG /3 ML (0.083 %) NEBULIZER SOLUTION] Take 3 mL (2.5 mg total) by nebulization every 4 (four) hours as needed for shortness of breath.   aspirin 81 MG EC tablet   Yes No   Sig: [ASPIRIN 81 MG EC TABLET] Take 81 mg by mouth every evening.     atenolol (TENORMIN) 25 MG tablet   No No   Sig: [ATENOLOL (TENORMIN) 25 MG TABLET] Take 1 tablet (25 mg total) by mouth every morning.   gabapentin (NEURONTIN) 300 MG capsule   Yes No   Sig: Take 300 mg by mouth 3 times daily   lisinopril (PRINIVIL,ZESTRIL) 20 MG tablet   Yes No   Sig: [LISINOPRIL (PRINIVIL,ZESTRIL) 20 MG TABLET] Take 20 mg by mouth every morning.   omeprazole (PRILOSEC) 20 MG capsule   Yes No   Sig: [OMEPRAZOLE (PRILOSEC) 20 MG CAPSULE] Take 20 mg by mouth daily before supper.   Patient not taking: Reported on 5/10/2022   oxyCODONE-acetaminophen (PERCOCET) 5-325 mg per tablet   Yes No   Sig: [OXYCODONE-ACETAMINOPHEN (PERCOCET) 5-325 MG PER TABLET] Take 1 tablet by mouth every 4 (four) hours as needed for pain.   simvastatin (ZOCOR) 20 MG tablet   Yes No   Sig: [SIMVASTATIN (ZOCOR) 20 MG TABLET] Take 20 mg by mouth every evening.    Patient not taking: Reported on 5/10/2022   tiotropium (SPIRIVA) 18 mcg inhalation capsule   No No   Sig: [TIOTROPIUM (SPIRIVA) 18 MCG INHALATION CAPSULE] Place 1 capsule (2 puffs total) into inhaler and inhale once daily.   triamterene-hydrochlorothiazide (DYAZIDE) 37.5-25 mg per capsule   Yes No   Sig: [TRIAMTERENE-HYDROCHLOROTHIAZIDE (DYAZIDE) 37.5-25 MG PER CAPSULE] Take 1 capsule by mouth every morning.      Facility-Administered Medications: None     Allergies   Allergies   Allergen Reactions     Morphine (Pf) [Morphine] Other (See Comments) and Dizziness     Hallucinations       Social History   Social History     Socioeconomic History     Marital status: Single     Spouse name: Not on file     Number of children: Not on file     Years of education: Not on file     Highest education level: Not on file   Occupational History     Not on file   Tobacco Use     Smoking status: Former Smoker     Quit date: 1985     Years since quittin.5     Smokeless tobacco: Never Used   Substance and Sexual Activity     Alcohol use: No     Drug use: No     Sexual  activity: Not on file   Other Topics Concern     Not on file   Social History Narrative     Not on file     Social Determinants of Health     Financial Resource Strain: Not on file   Food Insecurity: Not on file   Transportation Needs: Not on file   Physical Activity: Not on file   Stress: Not on file   Social Connections: Not on file   Intimate Partner Violence: Not on file   Housing Stability: Not on file       Family History   NAYANA due to lethargy    Review of Systems   Unable to complete due to lethargy.   Physical Exam   Temp: 96.8  F (36  C) Temp src: Axillary BP: (!) 173/77 Pulse: 89   Resp: 22 SpO2: 97 % O2 Device: Nasal cannula Oxygen Delivery: 2 LPM  Vital Signs with Ranges  Temp:  [96.8  F (36  C)] 96.8  F (36  C)  Pulse:  [73-89] 89  Resp:  [13-30] 22  BP: (137-188)/(67-94) 173/77  SpO2:  [94 %-100 %] 97 % 270 lbs 0 oz    Primary Survey:  Airway: lethargy, answers questions when awake  Breathing: symmetric respiratory effort bilaterally  Circulation: central pulses present and peripheral pulses present  Disability: Pupils - left 2 mm and brisk, right 2 mm and brisk   Emmanuelle Coma Scale - Total 14/15  Eye Response (E): 4  4= spontaneous,  3= to verbal/voice, 2=  to pain, 1= No response   Verbal Response (V): 4   5= Orientated, converses,  4= Confused, converses, 3= Inappropriate words,  2= Incomprehensible sounds,  1=No response   Motor Response (M): 6   6= Obeys commands, 5= Localizes to pain, 4= Withdrawal to pain, 3=Fexion to pain, 2= Extension to pain, 1= No response    Secondary Survey:  General:lethargy  Neuro: PERRLA. Challenging due to lethargy, able to state correct name, place and date  Head: atraumatic, normocephalic, trachea midline  Eyes:  Pupils 2mm, EOMI, corneas and conjunctivae clear  Nose: nares patent, no drainage, nasal septum non-tender  Mouth/Throat:very dry  Neck:  no cervical collar present. No midline posterior tenderness, full AROM without pain.   Chest/Pulmonary: normal  respiratory rate and rhythm,  bilateral clear breath sounds, no wheezes, rales or rhonchi, no chest wall tenderness or deformities,   Cardiovascular: S1, S2,  normal and regular rate and rhythm, no murmurs  Abdomen: soft, non-tender, no guarding, no rebound tenderness and no tenderness to palpation  : normal external genitalia, pelvis stable to lateral compression,  no ramos, no urine assess  Musculoskel/Extremities: left wrist and should pain with movement, no external signs of trauma or swelling noted  Back/Spine: no deformity, no midline tenderness, no sacral tenderness, no step-offs and no abrasions or contusions  Hands: no gross deformities of hands or fingers.   Psychiatric: lethargic  Skin: Warm and dry    Results for orders placed or performed during the hospital encounter of 09/06/22 (from the past 24 hour(s))   ABO/Rh type and screen    Narrative    The following orders were created for panel order ABO/Rh type and screen.  Procedure                               Abnormality         Status                     ---------                               -----------         ------                     Adult Type and Screen[[id number]]                            In process                   Please view results for these tests on the individual orders.       Studies:  No orders to display       DEAN [facility]   Trauma Services   Job code pager [phone] (24 hours a day)  Use Eayun to text page (not text page compatible with [email or web address]).    Dial * * * [phone] then  [phone], wait for prompt and then enter call back number.   Do NOT call numbers listed to right in Treatment Team section.

## 2022-09-06 NOTE — ED TRIAGE NOTES
Pt arrives via EMS c/o x4 falls this evening, generalized weakness, and pain. Pt reports that the weakness began over a weak ago that has caused daily falls.     Pt denies being on blood thinners and LOC. Has a Hx of COPD, CHF and diabetes     Triage Assessment     Row Name 09/06/22 0334       Triage Assessment (Adult)    Airway WDL WDL       Respiratory WDL    Respiratory WDL WDL       Skin Circulation/Temperature WDL    Skin Circulation/Temperature WDL WDL       Cardiac WDL    Cardiac WDL WDL       Peripheral/Neurovascular WDL    Peripheral Neurovascular WDL WDL

## 2022-09-06 NOTE — CONSULTS
[facility]       NEUROSURGERY CONSULTATION NOTE    This consultation was requested by Dr. Bernal  from the Emergency Medicine service.    Reason for Consultation: Acute Subdural hematoma    Time Paged/Notified: [phone] on 2022  Trauma Activation: yes  Arrival in ED: 0915  GCS: E 3 M 6 V 4 = 13      HPI: Soto Gibbs is a 82 year old male with PMHx of HTN, CKD (stage 3), COPD, DM2, and frequent falls.  He presented to [facility] ED following a fall last night.  He reports hitting his head but denies loss of consciousness. At [facility] Head CT revealed a right falx acute subdural hematoma thickest anteriorly measuring 8 mm with no significant midline shift.  He was transferred to [facility] for further evaluation. He received IV dilaudid for headache prior to arrival at [facility]. His exam is limited s/t to sedation. He is able to follow commands and answer questions with repeated stimuli. He was able to confirm that he takes aspirin 81 mg daily. Repeat Head CT will be obtained at 1030.     PAST MEDICAL HISTORY: No past medical history on file.    PAST SURGICAL HISTORY:   Past Surgical History:   Procedure Laterality Date     ABDOMEN SURGERY      Hernia     COLON SURGERY      Polyps.       FAMILY HISTORY: No family history on file.    SOCIAL HISTORY:   Social History     Tobacco Use     Smoking status: Former Smoker     Quit date: 1985     Years since quittin.5     Smokeless tobacco: Never Used   Substance Use Topics     Alcohol use: No       MEDICATIONS:  (Not in a hospital admission)      Allergies:  Allergies   Allergen Reactions     Morphine (Pf) [Morphine] Other (See Comments) and Dizziness     Hallucinations       ROS: 10 point ROS of systems including Constitutional, Eyes, Respiratory, Cardiovascular, Gastroenterology, Genitourinary, Integumentary, Muscularskeletal, Psychiatric were all negative except for pertinent positives noted in my HPI.    Physical exam:  "  Blood pressure 130/66, pulse 77, temperature 96.8  F (36  C), temperature source Axillary, resp. rate 22, height 1.905 m (6' 3\"), weight 122.5 kg (270 lb), SpO2 96 %.  CV: RRR, no murmurs, rubs, or gallops  PULM: breathing comfortably on supplemental oxygen via NC  ABD: soft, non-distended  NEUROLOGIC:  -- Awake; Alert; oriented x1, sedated  -- Follows commands   -- Speech fluent, spontaneous. No aphasia or dysarthria.  -- Visual fields full to confrontation, PERRL 3 mm bilat and brisk, extraocular movements intact  -- Face symmetrical, tongue midline  -- Hearing grossly intact bilat  * Exam limited s/t sedation    Motor:  Normal bulk / tone; no tremor, rigidity, or bradykinesia.  No muscle wasting or fasciculations  Pronator Drift * unable to assess s/t sedation     Delt Bi Tri Hand Flexion/  Extension Iliopsoas Quadriceps Hamstrings Tibialis Anterior Gastroc    C5 C6 C7 C8/T1 L2 L3 L4-S1 L4 S1   R 4* 4* 4* 5 4* 4* 4* 4* 5   L 4* 4* 4* 5 4* 4* 4* 4* 5   Sensory: Unable to assess*   * Exam limited s/t sedation    Reflexes:     Bi Tri BR Torie Pat Ach Bab    C5-6 C7-8 C6 UMN L2-4 S1 UMN   R 2+ 2+ 2+ Norm 2+ 2+ Norm   L 2+ 2+ 2+ Norm 2+ 2+ Norm     Gait: Deferred      LABS:  Last Comprehensive Metabolic Panel:  Sodium   Date Value Ref Range Status   09/06/2022 142 136 - 145 mmol/L Final     Potassium   Date Value Ref Range Status   09/06/2022 4.1 3.5 - 5.0 mmol/L Final     Chloride   Date Value Ref Range Status   09/06/2022 94 (L) 98 - 107 mmol/L Final     Carbon Dioxide (CO2)   Date Value Ref Range Status   09/06/2022 40 (H) 22 - 31 mmol/L Final     Anion Gap   Date Value Ref Range Status   09/06/2022 8 5 - 18 mmol/L Final     Glucose   Date Value Ref Range Status   09/06/2022 117 70 - 125 mg/dL Final     Urea Nitrogen   Date Value Ref Range Status   09/06/2022 40 (H) 8 - 28 mg/dL Final     Creatinine   Date Value Ref Range Status   09/06/2022 2.07 (H) 0.70 - 1.30 mg/dL Final     GFR Estimate   Date Value Ref Range " Status   09/06/2022 32 (L) >60 mL/min/1.73m2 Final     Comment:     Effective December 21, 2021 eGFRcr in adults is calculated using the 2021 CKD-EPI creatinine equation which includes age and gender (Kareem et al., NEJM, DOI: 10.1056/GQEPka5911028)   06/28/2021 37 (L) >60 mL/min/1.73m2 Final     Calcium   Date Value Ref Range Status   09/06/2022 9.4 8.5 - 10.5 mg/dL Final     Lab Results   Component Value Date    WBC 8.2 09/06/2022     Lab Results   Component Value Date    RBC 3.91 09/06/2022     Lab Results   Component Value Date    HGB 10.8 09/06/2022     Lab Results   Component Value Date    HCT 36.8 09/06/2022     Lab Results   Component Value Date    MCV 94 09/06/2022     Lab Results   Component Value Date    MCH 27.6 09/06/2022     Lab Results   Component Value Date    MCHC 29.3 09/06/2022     Lab Results   Component Value Date    RDW 14.6 09/06/2022     Lab Results   Component Value Date     09/06/2022       IMAGING:  EXAM: CT HEAD W/O CONTRAST  LOCATION: [facility]  DATE/TIME: 9/6/2022 4:35 AM     INDICATION: trauma, blunt head injury  COMPARISON: None.  TECHNIQUE: Routine CT Head without IV contrast. Multiplanar reformats. Dose reduction techniques were used.     FINDINGS:  INTRACRANIAL CONTENTS: Right falx acute subdural hematoma thickest anteriorly measuring 8 mm. No additional acute intracranial hemorrhages. No significant midline shift. No CT evidence of acute infarct. Mild presumed chronic small vessel ischemic   changes. Mild generalized volume loss. No hydrocephalus.      VISUALIZED ORBITS/SINUSES/MASTOIDS: No intraorbital abnormality. No paranasal sinus mucosal disease. No middle ear or mastoid effusion.     BONES/SOFT TISSUES: No acute abnormality.                                                      IMPRESSION:  1.  Right falx acute subdural hematoma described above.  2.  No significant midline shift.  3.  Age-related changes described above.    EXAM: CT CERVICAL  SPINE W/O CONTRAST  LOCATION: [facility]  DATE/TIME: 9/6/2022 4:36 AM     INDICATION: trauma, neck pain  COMPARISON: None.  TECHNIQUE: Routine CT Cervical Spine without IV contrast. Multiplanar reformats. Dose reduction techniques were used.     FINDINGS:  VERTEBRA: No acute fracture.  No acute post traumatic subluxations.   Normal vertebral body heights.     CANAL/FORAMINA: Multilevel spondylosis result in various levels and degrees of central canal stenosis and neural foraminal stenosis. C4-C5, C5-C6, C6-C7 severe central canal stenosis.   No significant subluxations.     PARASPINAL: Emphysema. Lung apices demonstrate multiple large bulla/blebs especially within the right lung apex. Left upper lobe subpleural nodule measuring 5 mm new compared to CT chest 09/04/2019 and CT chest 03/02/2022. Recommend CT chest follow-up in   12 months per Fleischner criteria. Right anterior mediastinum nodule or cyst measuring 1.8 cm similar compared to CT chest 09/04/2019 with interval stability reassuring.                                                                IMPRESSION:  1.  No acute fracture.  2.  Degenerative changes described above.  3.  Emphysema described above.  4.  Left upper lobe small subpleural pulmonary nodule measuring 5 mm described above. Recommend CT chest follow-up in 12 months per Fleischner criteria.       ASSESSMENT:  Soto Gibbs is a 82 year old male with PMHx of HTN, CKD (stage 3), COPD, DM2, and frequent falls.  He presented to [facility] ED following a fall last night.  He reports hitting his head but denies loss of consciousness. At [facility], Head CT revealed a right falx acute subdural hematoma thickest anteriorly measuring 8 mm with no significant midline shift.  He was transferred to [facility] for further evaluation. He received IV dilaudid for headache prior to arrival at [facility]. His exam is limited s/t to sedation. He is able to follow commands and answer questions with  repeated stimuli. He takes aspirin 81 mg daily. Repeat Head CT will be obtained at 1030.     Clinically Significant Risk Factors Present on Admission             # Hypoalbuminemia: Albumin = 3.0 g/dL (Ref range: 3.5 - 5.0 g/dL) on admission, will monitor as appropriate      # CKD, Stage 3b (GFR 30-44): Will monitor and treat as appropriate  - last Cr =  2.07 mg/dL (Ref range: 0.70 - 1.30 mg/dL)  - last GFR = 32 mL/min/1.73m2 (Ref range: >60 mL/min/1.73m2)  # Compression of brain   # Diabetes Mellitus type 2    RECOMMENDATIONS:  No neurosurgical intervention indicated at this time   Repeat head CT is stable. Completed at 1030 9/6/2022  HOB > 30 degrees  Serial neuro exams   Hold aspirin and any anticoagulant medications   Keppra 500 mg BID for seizure ppx x 7 days  Maintain SBP < 140  Platelets > 100,000  INR < 1.5  Hemoglobin > 8  Rest of care per primary team  Follow-up with Neurosurgery in 2 weeks with repeat head CT.     The patient was discussed with Dr. Massimo Monteiro, neurosurgery chief resident, and Dr. Lj Leary, neurosurgery staff, and they agree with the above.    Hu Yeboah, DEAN, CNP  Neurosurgery  Pager [phone]

## 2022-09-06 NOTE — ED NOTES
Bed: [facility]ED-11  Expected date: 9/6/22  Expected time: 3:24 AM  Means of arrival: Ambulance  Comments:  [facility]  83 y/o Male  Fall/ Weakness

## 2022-09-06 NOTE — PROGRESS NOTES
"Writer was called by patient's nurse who informed this writer that patient had been back on BiPAP for \"a while.\"  Writer kaya ordered ABG which showed following improved results compared to earlier VBG off BiPAP results:   Latest Reference Range & Units 09/06/22 12:51   pH Arterial 7.35 - 7.45  7.38   pCO2 Arterial 35 - 45 mm Hg 68 (H)   PO2 Arterial 80 - 105 mm Hg 67 (L)   Bicarbonate Arterial 21 - 28 mmol/L 41 (H)   Base Excess Art -9.0 - 1.8 mmol/L 12.9 (H)   FIO2  25   Oxyhemoglobin Arterial 92 - 100 % 92   (H): Data is abnormally high  (L): Data is abnormally low    Writer attempted to see patient to draw ABG per MD order one hour after being placed on BiPAP. Patient was being transported to CT off BiPAP. Writer will re-attempt one hour after patient is back on BiPAP.      RT will continue to monitor and follow.  Nishant Lindsey, RT  "

## 2022-09-06 NOTE — ED NOTES
Bed: ED26  Expected date:   Expected time:   Means of arrival:   Comments:  [facility]  8mm Right Subdural Hematoma

## 2022-09-06 NOTE — ED TRIAGE NOTES
"Pt transferred from [facility] S/P fall with right subdural hematoma. Pt received 1 rory of Keppra and Dilaudid 0.5 mg PTA. C spines cleared at [facility]. CMS intact.Upon arrival pt lethargic. Arouses to verbal stimuli. Falls asleep during conversation. Requires repeated verbal stimulation to stay awake. Follows commands. C/O pain \"all over\". MD at bedside assessing pt.     Triage Assessment     Row Name 09/06/22 0834       Triage Assessment (Adult)    Airway WDL WDL       Respiratory WDL    Respiratory WDL X;rhythm/pattern    Rhythm/Pattern, Respiratory tachypneic       Cardiac WDL    Cardiac WDL WDL       Peripheral/Neurovascular WDL    Peripheral Neurovascular WDL WDL       Cognitive/Neuro/Behavioral WDL    Cognitive/Neuro/Behavioral WDL X;level of consciousness    Level of Consciousness lethargic    Arousal Level arouses to voice    Orientation oriented x 4    Speech slow;slurred    Mood/Behavior cooperative;calm       Pupils (CN II)    Pupil PERRLA yes    Pupil Size Left 1 mm    Pupil Size Right 1 mm       Emmanuelle Coma Scale    Best Eye Response 3-->(E3) to speech    Best Motor Response 5-->(M5) localizes pain    Best Verbal Response 5-->(V5) oriented    Jenners Coma Scale Score 13    Assessment Qualifiers patient chemically sedated or paralyzed       Visual Assessment    Visual Tracking NAYANA - sedated       Motor Response    LUE Motor Response purposeful/movement localizing    RUE Motor Response purposeful/movement localizing    LLE Motor Response purposeful/movement localizing    RLE Motor Response purposeful/movement localizing              "

## 2022-09-06 NOTE — CONSULTS
[facility]       NEUROSURGERY CONSULTATION NOTE    This consultation was requested by Dr. Bernal  from the Emergency Medicine service.    Reason for Consultation: Acute Subdural hematoma    Time Paged/Notified: [phone] on 2022  Trauma Activation: yes  Arrival in ED: 0915  GCS: E 3 M 6 V 4 = 13      HPI: Soto Gibbs is a 82 year old male with PMHx of HTN, CKD (stage 3), COPD, DM2, and frequent falls.  He presented to [facility] ED following a fall last night.  He reports hitting his head but denies loss of consciousness. At [facility] Head CT revealed a right falx acute subdural hematoma thickest anteriorly measuring 8 mm with no significant midline shift.  He was transferred to [facility] for further evaluation. He received IV dilaudid for headache prior to arrival at [facility]. His exam is limited s/t to sedation. He is able to follow commands and answer questions with repeated stimuli. He was able to confirm that he takes aspirin 81 mg daily. Repeat Head CT will be obtained at 1030.     PAST MEDICAL HISTORY: No past medical history on file.    PAST SURGICAL HISTORY:   Past Surgical History:   Procedure Laterality Date     ABDOMEN SURGERY      Hernia     COLON SURGERY      Polyps.       FAMILY HISTORY: No family history on file.    SOCIAL HISTORY:   Social History     Tobacco Use     Smoking status: Former Smoker     Quit date: 1985     Years since quittin.5     Smokeless tobacco: Never Used   Substance Use Topics     Alcohol use: No       MEDICATIONS:  (Not in a hospital admission)      Allergies:  Allergies   Allergen Reactions     Morphine (Pf) [Morphine] Other (See Comments) and Dizziness     Hallucinations       ROS: 10 point ROS of systems including Constitutional, Eyes, Respiratory, Cardiovascular, Gastroenterology, Genitourinary, Integumentary, Muscularskeletal, Psychiatric were all negative except for pertinent positives noted in my HPI.    Physical exam:  "  Blood pressure (!) 165/71, pulse 85, temperature 96.8  F (36  C), temperature source Axillary, resp. rate 22, height 1.905 m (6' 3\"), weight 122.5 kg (270 lb), SpO2 91 %.  CV: RRR, no murmurs, rubs, or gallops  PULM: breathing comfortably on supplemental oxygen via NC  ABD: soft, non-distended  NEUROLOGIC:  -- Awake; Alert; oriented x1, sedated  -- Follows commands   -- Speech fluent, spontaneous. No aphasia or dysarthria.  -- Visual fields full to confrontation, PERRL 3 mm bilat and brisk, extraocular movements intact  -- Face symmetrical, tongue midline  -- Hearing grossly intact bilat  * Exam limited s/t sedation    Motor:  Normal bulk / tone; no tremor, rigidity, or bradykinesia.  No muscle wasting or fasciculations  Pronator Drift * unable to assess s/t sedation     Delt Bi Tri Hand Flexion/  Extension Iliopsoas Quadriceps Hamstrings Tibialis Anterior Gastroc    C5 C6 C7 C8/T1 L2 L3 L4-S1 L4 S1   R 4* 4* 4* 5 4* 4* 4* 4* 5   L 4* 4* 4* 5 4* 4* 4* 4* 5   Sensory: Unable to assess*   * Exam limited s/t sedation    Reflexes:     Bi Tri BR Torie Pat Ach Bab    C5-6 C7-8 C6 UMN L2-4 S1 UMN   R 2+ 2+ 2+ Norm 2+ 2+ Norm   L 2+ 2+ 2+ Norm 2+ 2+ Norm     Gait: Deferred      LABS:  Last Comprehensive Metabolic Panel:  Sodium   Date Value Ref Range Status   09/06/2022 142 136 - 145 mmol/L Final     Potassium   Date Value Ref Range Status   09/06/2022 4.1 3.5 - 5.0 mmol/L Final     Chloride   Date Value Ref Range Status   09/06/2022 94 (L) 98 - 107 mmol/L Final     Carbon Dioxide (CO2)   Date Value Ref Range Status   09/06/2022 40 (H) 22 - 31 mmol/L Final     Anion Gap   Date Value Ref Range Status   09/06/2022 8 5 - 18 mmol/L Final     Glucose   Date Value Ref Range Status   09/06/2022 117 70 - 125 mg/dL Final     Urea Nitrogen   Date Value Ref Range Status   09/06/2022 40 (H) 8 - 28 mg/dL Final     Creatinine   Date Value Ref Range Status   09/06/2022 2.07 (H) 0.70 - 1.30 mg/dL Final     GFR Estimate   Date Value Ref " Range Status   09/06/2022 32 (L) >60 mL/min/1.73m2 Final     Comment:     Effective December 21, 2021 eGFRcr in adults is calculated using the 2021 CKD-EPI creatinine equation which includes age and gender (Kareem et al., NEJM, DOI: 10.1056/TLNKat0789800)   06/28/2021 37 (L) >60 mL/min/1.73m2 Final     Calcium   Date Value Ref Range Status   09/06/2022 9.4 8.5 - 10.5 mg/dL Final     Lab Results   Component Value Date    WBC 8.2 09/06/2022     Lab Results   Component Value Date    RBC 3.91 09/06/2022     Lab Results   Component Value Date    HGB 10.8 09/06/2022     Lab Results   Component Value Date    HCT 36.8 09/06/2022     Lab Results   Component Value Date    MCV 94 09/06/2022     Lab Results   Component Value Date    MCH 27.6 09/06/2022     Lab Results   Component Value Date    MCHC 29.3 09/06/2022     Lab Results   Component Value Date    RDW 14.6 09/06/2022     Lab Results   Component Value Date     09/06/2022       IMAGING:  EXAM: CT HEAD W/O CONTRAST  LOCATION: [facility]  DATE/TIME: 9/6/2022 4:35 AM     INDICATION: trauma, blunt head injury  COMPARISON: None.  TECHNIQUE: Routine CT Head without IV contrast. Multiplanar reformats. Dose reduction techniques were used.     FINDINGS:  INTRACRANIAL CONTENTS: Right falx acute subdural hematoma thickest anteriorly measuring 8 mm. No additional acute intracranial hemorrhages. No significant midline shift. No CT evidence of acute infarct. Mild presumed chronic small vessel ischemic   changes. Mild generalized volume loss. No hydrocephalus.      VISUALIZED ORBITS/SINUSES/MASTOIDS: No intraorbital abnormality. No paranasal sinus mucosal disease. No middle ear or mastoid effusion.     BONES/SOFT TISSUES: No acute abnormality.                                                      IMPRESSION:  1.  Right falx acute subdural hematoma described above.  2.  No significant midline shift.  3.  Age-related changes described above.    EXAM: CT  CERVICAL SPINE W/O CONTRAST  LOCATION: [facility]  DATE/TIME: 9/6/2022 4:36 AM     INDICATION: trauma, neck pain  COMPARISON: None.  TECHNIQUE: Routine CT Cervical Spine without IV contrast. Multiplanar reformats. Dose reduction techniques were used.     FINDINGS:  VERTEBRA: No acute fracture.  No acute post traumatic subluxations.   Normal vertebral body heights.     CANAL/FORAMINA: Multilevel spondylosis result in various levels and degrees of central canal stenosis and neural foraminal stenosis. C4-C5, C5-C6, C6-C7 severe central canal stenosis.   No significant subluxations.     PARASPINAL: Emphysema. Lung apices demonstrate multiple large bulla/blebs especially within the right lung apex. Left upper lobe subpleural nodule measuring 5 mm new compared to CT chest 09/04/2019 and CT chest 03/02/2022. Recommend CT chest follow-up in   12 months per Fleischner criteria. Right anterior mediastinum nodule or cyst measuring 1.8 cm similar compared to CT chest 09/04/2019 with interval stability reassuring.                                                                IMPRESSION:  1.  No acute fracture.  2.  Degenerative changes described above.  3.  Emphysema described above.  4.  Left upper lobe small subpleural pulmonary nodule measuring 5 mm described above. Recommend CT chest follow-up in 12 months per Fleischner criteria.     EXAM: CT HEAD W/O CONTRAST  9/6/2022 11:17 AM      HISTORY:  follow up right falx SDH        COMPARISON: No comparison imaging available at the time of this  dictation. However, the patient reportedly had a head CT at [facility]  with a right falcine subdural hematoma measuring up to 8 mm in  thickness.     TECHNIQUE: Using multidetector thin collimation helical acquisition  technique, axial, coronal and sagittal CT images from the skull base  to the vertex were obtained without intravenous contrast.   (topogram) image(s) also obtained and  reviewed.     FINDINGS:  Mildly motion degraded examination. Small acute subdural hematoma  along the falx on the right measuring up to 8 mm in greatest  thickness. Incidental anterior falcine ossification. No midline shift.  No acute loss of gray-white matter differentiation in the cerebral  hemispheres. Ventricles are proportionate to the cerebral sulci. Clear  basal cisterns.     The bony calvaria and the bones of the skull base are normal. The  visualized portions of the paranasal sinuses and mastoid air cells are  clear. Bilateral pseudophakia.                                                      IMPRESSION: Acute right subdural falcine hematoma measuring up to 8 mm in thickness, similar to reported size on prior [facility] head CT    ASSESSMENT:  Soto Gibbs is a 82 year old male with PMHx of HTN, CKD (stage 3), COPD, DM2, and frequent falls.  He presented to [facility] ED following a fall last night.  He reports hitting his head but denies loss of consciousness. At [facility], Head CT revealed a right falx acute subdural hematoma thickest anteriorly measuring 8 mm with no significant midline shift.  He was transferred to [facility] for further evaluation. He received IV dilaudid for headache prior to arrival at [facility]. His exam is limited s/t to sedation. He is able to follow commands and answer questions with repeated stimuli. He takes aspirin 81 mg daily. Repeat Head CT will be obtained at 1030.     Clinically Significant Risk Factors Present on Admission             # Hypoalbuminemia: Albumin = 3.0 g/dL (Ref range: 3.5 - 5.0 g/dL) on admission, will monitor as appropriate      # CKD, Stage 3b (GFR 30-44): Will monitor and treat as appropriate  - last Cr =  2.07 mg/dL (Ref range: 0.70 - 1.30 mg/dL)  - last GFR = 32 mL/min/1.73m2 (Ref range: >60 mL/min/1.73m2)  # Compression of brain   # Diabetes Mellitus type 2    RECOMMENDATIONS:  No neurosurgical intervention indicated at this time   Repeat head CT stable,  completed at 1030 9/6/2022  HOB > 30 degrees  Serial neuro exams   Hold aspirin and any anticoagulant medications   Keppra 500 mg BID for seizure ppx x 7 days  Maintain SBP < 140  Platelets > 100,000  INR < 1.5  Hemoglobin > 8  Rest of care per primary team  Follow-up with Neurosurgery in 2 weeks with repeat head CT    The patient was discussed with Dr. Massimo Monteiro, neurosurgery chief resident, and Dr. Lj Leary, neurosurgery staff, and they agree with the above.    Hu Yeboah, APRN, CNP  Neurosurgery  Pager [phone]

## 2022-09-06 NOTE — CONSULTS
[facility]  Palliative Care Consultation Note    Patient: Soto Gibbs  Date of Admission:  9/6/2022    Requesting Clinician / Team: Estefania Hernández APRN CNP  Reason for consult: Elderly male with multiple co-morbidities, falls, traumatic SDH    Recommendations:  Acute on chronic pain, likely from fall, possibly related to neoplasm    S/p Bx 8/28 which showed low grade renal cell carcinoma. Done since patient complained of flank pain and CT showed renal mass.     Suffered from falls and hit head prior to this admission    Per chart review, was somewhat sedated due to Dilaudid 0.5 mg IV x1    Not able to give NSAIDs due to CKD    START Tylenol 1000 mg po TID    START Dilaudid 0.2 mg IV q3h prn pain (Hopefully lower dose will not cause as much sedation) and this will temporize him for the near future    Encounter for palliative care  Psychosocial support    Psychosocial support was provided to patient and/or family.    Prognosis: poor to fair    Palliative performance scale (PPS): 50-60% (unable to fully assess due to acuity of medical condition)    CODE status: AND, okay with intubation for now Soto stated that he knew CPR could cause some of his ribs to break and did NOT want his ribs broken. He initially stated that he did not want to have tube stuck down his throat but then was unsure if he would want short-term intubation or not, will readdress tomorrow as he was a bit somnolent while on BiPAP)    Goals of Care: Disease-oriented for now.     After asking about his thoughts regarding CPR, we asked about his thoughts regarding intubation at which point he asked us to call his daughter Flakita.     We reached Flkaita on the phone and updated her on the repeat CT H noting the subdural hematoma was stable at 8mm. She noted to us that she was in the process of looking into directives and was wondering if our service could help with this.    At this point we tried  to speak to Soto about his medical goals again but he deferred to Flakita who stated she would prefer to make a shared decision with him.    Counseled Flakita that while we cannot help with financial issues, we can help with medical and explained the concept of a POLST form and directive packet to her and noted that sometimes case management can help with notarization.    Flakita noted that she is a teacher and cannot be in the hospital until after 3 or 4pm.     Concluded that it would be best to wait until tomorrow when Soto can hopefully participate more and during which time we can walk through the aforementioned forms as well.     Disposition: Inpatient per primary    Palliative medicine will see patient tomorrow and try to speak with Flakita in-person.     These recommendations have been relayed via this note to primary team.      Thank you for the opportunity to participate in the care of this patient and family. Our team: will continue to follow.     During regular M-F work hours -- if you are not sure who specifically to contact -- please contact us by sending a text page to our team consult pager at [phone].    After regular work hours and on weekends/holidays, you can call our answering service at [phone]. Also, who's on call for us is available in [employer] Smart Web.     Total time spent was >55 minutes,  >50% of time was spent counseling and/or coordination of care regarding goals of care and symptom control.    Isacc Tuttle DO / Palliative Medicine / Text me via [employer].       Assessments:  Soto Gibbs is a 82 year old male who was seen at OSH for striking his head against a table after falling and transferred to [facility] on 9/6 as CT Head showed 8mm subdural hematoma from fall with otherwise negative imaging for fractures.  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, FORTUNATO on CPAP, CKD 3b, Right renal mass s/p Bx 8/28, and multiple falls     Today, the patient was seen for:  Acute on chronic pain from fall and possibly  neoplasm-related as well  Encounter for palliative pain    Prognosis, Goals, & Planning:      Functional Status just prior to hospitalization: 2 (Ambulatory and capable of all selfcare but unable to carry out any work activities; may need help with IADLs up and about > 50% of waking hours) (estimate based on chart history)      Prognosis, Goals, and/or Advance Care Planning were addressed today: Yes        Summary/Comments:  As above      Patient's decision making preferences: shared with support from loved ones          Patient has decision-making capacity today for complex decisions: Yes            I have concerns about the patient/family's health literacy today: No           Patient has a completed Health Care Directive: No.       Code status: No CPR; pre-arrest intubation OK    Coping, Meaning, & Spirituality:   Mood, coping, and/or meaning in the context of serious illness were addressed today: No      Social:     Key family / caregivers: Daughter Flakita    History of Present Illness:  History gathered today from: patient, medical chart, medical team members    Soto Gibbs is a 82 year old male who was seen at OSH for striking his head against a table after falling and transferred to [facility] on 9/6 as CT Head showed 8mm subdural hematoma from fall with otherwise negative imaging for fractures.  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    Patient seen by neurosurgery who did not recommend intervention. Repeat CT H showed stable 8mm subdural hematoma. Patient being admitted for management of falls, ELISABETH, and hypercapnic respiratory failure. Patient received x1 dose of hydromorphone 0.5 mg IV and x1 dose of Keppra 1000 mg IV prior to transfer. Supposedly a bit altered after Dilaudid.     Seen while he was still in ED. Soto was on BiPAP. He was able to speak with us. He did ask us to call Flakita his daughter and deferred decisions to her. Conversation  detailed above.    Otherwise, from symptom standpoint, noted bilateral lower extremity foot and knee pain, LUE pain in general causing guarded movement/weakness, and complained of a mild headache.    Key Palliative Symptom Data:  # Pain severity the last 12 hours: moderate  We are not managing dyspnea in this patient  We are not managing nausea in this patient  We are not managing anxiety in this patient    Patient is on opioids: assessed and bowels ok/no needed changes to plan of care today.    ROS:  A complete 14 point ROS was negative unless stated otherwise above in HPI     Past Medical History:  No past medical history on file.     Past Surgical History:  Past Surgical History:   Procedure Laterality Date     ABDOMEN SURGERY      Hernia     COLON SURGERY      Polyps.         Family History:  Daughter: Alive     Allergies:  Allergies   Allergen Reactions     Morphine (Pf) [Morphine] Other (See Comments) and Dizziness     Hallucinations        Medications:  I have reviewed this patient's medication profile and medications from this hospitalization.   Noted:  Dilaudid 0.5 mg IV x1    Physical Exam:  Vital Signs: Temp: 96.8  F (36  C) Temp src: Axillary BP: 130/59 Pulse: 74   Resp: 22 SpO2: 95 % O2 Device: BiPAP/CPAP Oxygen Delivery: 2 LPM  Weight: 270 lbs 0 oz   General: Not in acute distress.  Head: Atraumatic. Normocephalic.   Eyes: Anicteric without injection. Eyes conjugate. Extraocular movements intact.  Ear, Nose, and Throat: Mouth pink without lesions. Neck supple without masses, thyromegaly, or LAD. Dry from BiPAP  Pulmonary: Unlabored. Clear to auscultation bilaterally. Speaking in full sentences. BiPAP.  Cardiovascular:No jugular venous distension. Mild b/l LE edematous.  Abdomen: Soft. Non-tender to palpation. Normal bowel sound present.  Skin: Warm. Dry. Lower extremity skin changes.  Musculoskeletal: Joints of hand normal. Muscle atrophy.  Neuro: Speech fluent though difficult with BiPAP. Face  symmetric. Decreased LUE strength. No focal neurologic deficit noted. LUE pain in general causing guarded movement/weakness  Psych: Normal mood and affect. Unable to fully assess fund of knowledge and sensorium as patient deferred further discussion to daughter and was sleepy.    Data reviewed:  Recent imaging reviewed, my comments on pertinents:   CT Head w/o Contrast [[id number]] Resulted: 09/06/22 1125   IMPRESSION: Acute right subdural falcine hematoma measuring up to 8 mm   in thickness, similar to reported size on prior [facility] head CT.       Recent lab data reviewed, my comments on pertinents:   ROUTINE ICU LABS (Last four results)  CMPRecent Labs   Lab 09/06/22  0401      POTASSIUM 4.1   CHLORIDE 94*   CO2 40*   ANIONGAP 8      BUN 40*   CR 2.07*   GFRESTIMATED 32*   LEENA 9.4   PROTTOTAL 7.7   ALBUMIN 3.0*   BILITOTAL 0.4   ALKPHOS 67   AST 12   ALT <9     CBC  Recent Labs   Lab 09/06/22  0401   WBC 8.2   RBC 3.91*   HGB 10.8*   HCT 36.8*   MCV 94   MCH 27.6   MCHC 29.3*   RDW 14.6        INR  Recent Labs   Lab 09/06/22  0401   INR 1.10     Arterial Blood Gas  Recent Labs   Lab 09/06/22  1251   PH 7.38   PCO2 68*   PO2 67*   HCO3 41*   O2PER 25

## 2022-09-06 NOTE — ED PROVIDER NOTES
EMERGENCY DEPARTMENT ENCOUNTER      NAME: Soto Gibbs  AGE: 82 year old male  YOB: 1940  MRN: [id number]  EVALUATION DATE & TIME: 9/6/2022  3:30 AM    PCP: Dimitris Calderon    ED PROVIDER: Divina Jolley M.D.      Chief Complaint   Patient presents with     Fall     Generalized Weakness         FINAL IMPRESSION:  1. Falls frequently    2. Muscle weakness (generalized)    3. Subdural hematoma (H)        MEDICAL DECISION MAKING:    Pertinent Labs & Imaging studies reviewed. (See chart for details)  ED Course as of 09/06/22 0543   Tue Sep 06, 2022   0343 Afebrile.  Vital signs here unremarkable.  Is on 2 L by nasal cannula saturating at 98%.  Patient is coming into the emergency department today with just generalized weakness.  Says it started about a week ago, he is fallen 6-7 times.  Today he fell and struck his head.  No loss of consciousness but has not been able to get around well.  He does live at home alone, does have somebody come and check on him and help him out during the day.  Prior to his falls he does states that he feels the muscles in his legs give out.  He does not describe any dizziness, lightheadedness, chest pain, palpitations, shortness of breath.  No nausea or vomiting.  She states he cannot get strength to stand up and he falls.  EKG here shows sinus rhythm at a rate of 72.  Left axis deviation.  There is no ST elevations or depressions.  QTC is 405, QRS is 108, CO is 150.  As compared with previous EKG from February 28, 2017 left axis deviation now present.  Otherwise no significant change.   0345 Exam for patient here without any obvious signs of head trauma.  TMs are clear bilaterally without hemotympanum.  There is no midline or paraspinal C-spine tenderness.  No midline back tenderness.  No chest wall or abdominal tenderness.  He reports tenderness with manipulation and bony palpation along his left shoulder, left elbow and left wrist.  There is no swelling along his left  upper extremity he has appropriate radial and ulnar pulses distally.  Bilateral lower extremities he reports pain with palpation in his bilateral knees.  Also tenderness with palpation in his right hip with manipulation and palpation of his greater trochanter.  He has no evidence of any bruising or trauma or skin breakdown in his bilateral lower extremities.  Strength is equal bilateral upper and lower extremities.  Cranial nerves III through XII are grossly intact.    Patient coming in with generalized weakness.  It seems that he did have a biopsy done couple weeks ago for his right kidney.  I did look at results and there is concern for low-grade neoplasm.  This has not yet been discussed with patient.  He is not describing any increased shortness of breath or chest discomfort.  He is on oxygen at times at night at baseline and he has a history of obstructive sleep apnea.  We will initiate broad work-up with imaging.  We will also check urinalysis.  Patient will need to be admitted to the hospital secondary to increased weakness and multiple falls.   0347 Patient's labs here are starting to come back, hemoglobin is 10.6, this is down from baseline which seems like 12.6 just recorded back in May.  VBG did come back as well, he is acidotic here and does have some CO2 retention.  This could be in part contributing, bicarb is elevated so likely he has some chronic elevation however given his acute acidosis may consider putting him on BiPAP.  Currently patient is over in x-ray and CT getting images performed.  Given the drop in hemoglobin we will perform a rectal exam to evaluate for any source of GI bleed.   0421 Patient states he has been using his CPAP at night.  He is awake and alert.  Does not seem to be confused at all.  Do not feel he requires any urgent BiPAP at this time.  I did do a rectal exam after explaining to him my concern given his drop in hemoglobin and he is amenable to that.  He does have what looks  like a stage I sacral decubitus ulcer, otherwise rectum without any external hemorrhoids.  Digital rectal exam without any palpable internal hemorrhoids.  Stool is tan in color, loose but without any radha blood.  Will send for Hemoccult.   0507 CT scan of the head was done, he does have an 8 mm right-sided subdural hematoma.  He is not on any blood thinner medications, does not take aspirin daily.  We will call over to [facility] for trauma transfer for traumatic subdural.   0510 I did go through patient's medication list, he does take 81 mg of aspirin daily.  I did speak with him about this, he did state that he is taking that at this time.   0523 Spoke with Dr. Gibbs in the ED at [facility].  They will accept patient for transfer.   0524 Patient and daughter both updated.   0524 Occult blood here is negative.     Loaded 1 g of Keppra per request from [facility] emergency department physician Dr. Gibbs      Critical care: 120 minutes excluding separately billable procedures.  Includes bedside management, time reviewing test results, review of records, discussing the case with staff, documenting the medical record and time spent with family members (or surrogate decision makers) discussing specific treatment issues.          ED COURSE:  3:36 AM I met with the patient, obtained history, performed an initial exam, and discussed options and plan for diagnostics and treatment here in the ED.  5:00 AM I rechecked patient.   5:03 AM I spoke to [facility].  5:05 AM I updated patient on results.  5:15 AM Per nursing report, there are no beds available at the [facility] at this time.  5:20 AM I spoke to Dr. Gibbs, [facility] Emergency Department, who accepts the patient for transfer.    The importance of close follow up was discussed. We reviewed warning signs and symptoms, and I instructed Mr. Gibbs to return to the emergency department  immediately if he develops any new or worsening symptoms. I provided additional verbal discharge instructions. Mr. Gibbs expressed understanding and agreement with this plan of care, his questions were answered, and he was discharged in stable condition.     MEDICATIONS GIVEN IN THE EMERGENCY:  Medications   levETIRAcetam (KEPPRA) 1,000 mg in sodium chloride 0.9 % 100 mL intermittent infusion (1,000 mg Intravenous New Bag 9/6/22 0535)       NEW PRESCRIPTIONS STARTED AT TODAY'S ER VISIT:  New Prescriptions    No medications on file          =================================================================    HPI    Patient information was obtained from: Patient and EMS    Use of : N/A         Soto Gibbs is a 82 year old male with a history of HTN, COPD, DM2, CKD3, who presents to the ED via EMS for the evaluation of fall.    Patient reports he fell a couple times over the last 24 hours, 6-7 over the last couple days. He states that he had a fall prior to arrival and hit his head on the table. No loss of consciousness. Patient now reports bilateral knee pain and left shoulder pain. He states that he has been feeling weak in the legs over the last week prior to falling and that his legs give out from underneath him, causing his falls. Patient reports his hands also become tremulous as well. Of note, he mentions he had a biopsy of his kidney on 8/25 and has since had a decreased appetite. Otherwise, he denies any dizziness, lightheadedness, neck pain, chest pain, abdominal pain, vomiting, urinary symptoms, recent sicknesses. No use of blood thinners. Patient reports he lives at home but does have someone to help with care. No other complaints at this time.    Per EMS, patient's oxygen was 97% on 2L.     REVIEW OF SYSTEMS   Review of Systems   Constitutional: Positive for appetite change (decreased).   Cardiovascular: Negative for chest pain.   Gastrointestinal: Negative for abdominal pain and vomiting.    Genitourinary: Negative for dysuria, frequency, hematuria and urgency.   Musculoskeletal: Negative for neck pain.        Positive for bilateral knee pain and left shoulder   Neurological: Positive for tremors and weakness. Negative for dizziness, syncope and light-headedness.   All other systems reviewed and are negative.    PAST MEDICAL HISTORY:  History reviewed. No pertinent past medical history.    PAST SURGICAL HISTORY:  Past Surgical History:   Procedure Laterality Date     ABDOMEN SURGERY      Hernia     COLON SURGERY      Polyps.       CURRENT MEDICATIONS:      Current Facility-Administered Medications:      levETIRAcetam (KEPPRA) 1,000 mg in sodium chloride 0.9 % 100 mL intermittent infusion, 1,000 mg, Intravenous, Once, Divina Jolley MD, Last Rate: 400 mL/hr at 09/06/22 0535, 1,000 mg at 09/06/22 0535    Current Outpatient Medications:      albuterol (PROVENTIL HFA;VENTOLIN HFA) 90 mcg/actuation inhaler, [ALBUTEROL (PROVENTIL HFA;VENTOLIN HFA) 90 MCG/ACTUATION INHALER] Inhale 2 puffs every 6 (six) hours as needed for wheezing or shortness of breath., Disp: 8.5 g, Rfl: 0     albuterol (PROVENTIL) 2.5 mg /3 mL (0.083 %) nebulizer solution, [ALBUTEROL (PROVENTIL) 2.5 MG /3 ML (0.083 %) NEBULIZER SOLUTION] Take 3 mL (2.5 mg total) by nebulization every 4 (four) hours as needed for shortness of breath., Disp: 75 mL, Rfl: 0     aspirin 81 MG EC tablet, [ASPIRIN 81 MG EC TABLET] Take 81 mg by mouth every evening. , Disp: , Rfl:      atenolol (TENORMIN) 25 MG tablet, [ATENOLOL (TENORMIN) 25 MG TABLET] Take 1 tablet (25 mg total) by mouth every morning., Disp: 30 tablet, Rfl: 0     gabapentin (NEURONTIN) 300 MG capsule, Take 300 mg by mouth 3 times daily, Disp: , Rfl:      lisinopril (PRINIVIL,ZESTRIL) 20 MG tablet, [LISINOPRIL (PRINIVIL,ZESTRIL) 20 MG TABLET] Take 20 mg by mouth every morning., Disp: , Rfl:      omeprazole (PRILOSEC) 20 MG capsule, [OMEPRAZOLE (PRILOSEC) 20 MG CAPSULE] Take 20 mg by mouth  "daily before supper. (Patient not taking: Reported on 5/10/2022), Disp: , Rfl:      oxyCODONE-acetaminophen (PERCOCET) 5-325 mg per tablet, [OXYCODONE-ACETAMINOPHEN (PERCOCET) 5-325 MG PER TABLET] Take 1 tablet by mouth every 4 (four) hours as needed for pain., Disp: , Rfl:      OXYGEN-AIR DELIVERY SYSTEMS MISC, [OXYGEN-AIR DELIVERY SYSTEMS MISC] Inhale 2 L/min continuous. Indications: copd, sleep apnea, Disp: , Rfl:      simvastatin (ZOCOR) 20 MG tablet, [SIMVASTATIN (ZOCOR) 20 MG TABLET] Take 20 mg by mouth every evening.  (Patient not taking: Reported on 5/10/2022), Disp: , Rfl:      tiotropium (SPIRIVA) 18 mcg inhalation capsule, [TIOTROPIUM (SPIRIVA) 18 MCG INHALATION CAPSULE] Place 1 capsule (2 puffs total) into inhaler and inhale once daily., Disp: 30 capsule, Rfl: 0     triamterene-hydrochlorothiazide (DYAZIDE) 37.5-25 mg per capsule, [TRIAMTERENE-HYDROCHLOROTHIAZIDE (DYAZIDE) 37.5-25 MG PER CAPSULE] Take 1 capsule by mouth every morning., Disp: , Rfl:     ALLERGIES:  Allergies   Allergen Reactions     Morphine (Pf) [Morphine] Other (See Comments) and Dizziness     Hallucinations       FAMILY HISTORY:  History reviewed. No pertinent family history.    SOCIAL HISTORY:   Social History     Socioeconomic History     Marital status: Single   Tobacco Use     Smoking status: Former Smoker     Quit date: 1985     Years since quittin.5     Smokeless tobacco: Never Used   Substance and Sexual Activity     Alcohol use: No     Drug use: No       PHYSICAL EXAM:    Vitals: BP (!) 159/67   Pulse 76   Resp 23   Ht 1.905 m (6' 3\")   SpO2 97%   BMI 34.69 kg/m     General:. Alert and interactive, comfortable appearing.  HENT: Oropharynx without erythema or exudates. MMM.  TMs clear bilaterally without hemotympanum. No obvious signs of head trauma.   Eyes: Pupils mid-sized and equally reactive.   Neck: Full AROM.  No midline tenderness to palpation.  Cardiovascular: Regular rate and rhythm. Peripheral pulses 2+ " bilaterally.  Chest/Pulmonary: Normal work of breathing. Lung sounds clear and equal throughout, no wheezes or crackles. No chest wall tenderness or deformities.  Abdomen: Soft, nondistended. Nontender without guarding or rebound.  Back/Spine: No CVA or midline tenderness. No paraspinal C-spine tenderness.  Extremities: Normal ROM of all major joints. No lower extremity edema. Patient reports tenderness with manipulation and bony palpation along left shoulder, left elbow, and left wrist. No swelling along left upper extremity. Appropriate radial and ulnar pulses distally. Patient reports pain with palpation in his bilateral knees. Tenderness with palpation in right hip with manipulation and palpation of greater trochanter.  Skin: Warm and dry. Normal skin color. No evidence of any bruising or trauma or skin breakdown in his bilateral lower extremities.   Neuro: Speech clear. CNs grossly intact. Moves all extremities appropriately. Strength and sensation grossly intact to all extremities.   Psych: Normal affect/mood, cooperative, memory appropriate.     LAB:  All pertinent labs reviewed and interpreted.  Labs Ordered and Resulted from Time of ED Arrival to Time of ED Departure   CBC WITH PLATELETS - Abnormal       Result Value    WBC Count 8.2      RBC Count 3.91 (*)     Hemoglobin 10.8 (*)     Hematocrit 36.8 (*)     MCV 94      MCH 27.6      MCHC 29.3 (*)     RDW 14.6      Platelet Count 280     COMPREHENSIVE METABOLIC PANEL - Abnormal    Sodium 142      Potassium 4.1      Chloride 94 (*)     Carbon Dioxide (CO2) 40 (*)     Anion Gap 8      Urea Nitrogen 40 (*)     Creatinine 2.07 (*)     Calcium 9.4      Glucose 117      Alkaline Phosphatase 67      AST 12      ALT <9      Protein Total 7.7      Albumin 3.0 (*)     Bilirubin Total 0.4      GFR Estimate 32 (*)    BLOOD GAS VENOUS - Abnormal    pH Venous 7.30 (*)     pCO2 Venous 89 (*)     pO2 Venous 24 (*)     Bicarbonate Venous 36 (*)     Base Excess/Deficit (+/-)  17.0      Oxyhemoglobin Venous 37.3 (*)     O2 Sat, Venous 38.1 (*)    TROPONIN I - Normal    Troponin I 0.02     B-TYPE NATRIURETIC PEPTIDE ([facility] ONLY) - Normal    BNP 22     INR - Normal    INR 1.10     OCCULT BLOOD STOOL - Normal    Occult Blood Negative     ROUTINE UA WITH MICROSCOPIC REFLEX TO CULTURE       RADIOLOGY:  XR Knee Left 1/2 Views   Final Result   IMPRESSION: Normal joint spaces and alignment. No fracture or joint effusion. Tricompartment degenerative osteoarthritic changes are noted.      XR Chest Port 1 View   Final Result   IMPRESSION: The cardiac silhouette is normal in size and contour. Subsegmental atelectasis is seen in the left midlung zone. The lungs are otherwise clear.      XR Shoulder Left 2 Views   Final Result   IMPRESSION: Normal joint spaces and alignment. No fracture. Mild AC joint degeneration is noted.      XR Wrist Left G/E 3 Views   Final Result   IMPRESSION: The bones are well-mineralized. Degenerative osteoarthritic changes of the first detention joint are noted. No acute fracture or malalignment is identified.      XR Pelvis and Hip Right 2 Views   Final Result   IMPRESSION: Normal joint spaces and alignment. No fracture.      Cervical spine CT w/o contrast   Final Result    IMPRESSION:   1.  No acute fracture.   2.  Degenerative changes described above.   3.  Emphysema described above.   4.  Left upper lobe small subpleural pulmonary nodule measuring 5 mm described above. Recommend CT chest follow-up in 12 months per Fleischner criteria.         DR MAKSIM BANERJEE was notified by Dr Keshav Young at  5:04 AM 09/06/2022.      Head CT w/o contrast   Final Result   IMPRESSION:   1.  Right falx acute subdural hematoma described above.   2.  No significant midline shift.   3.  Age-related changes described above.      DR MAKSIM BANERJEE was notified by Dr Keshav Young at  5:04 AM 09/06/2022.      Elbow  XR, G/E 3 views, left    (Results Pending)       EKG:  See MDM  I have independently  reviewed and interpreted the EKG(s) documented above.     Procedures:  120 minutes critical care time    I, Frida Houston, am serving as a scribe to document services personally performed by Dr. Divina Jolley  based on my observation and the provider's statements to me. I, Divina Jolley MD attest that Frida Houston is acting in a scribe capacity, has observed my performance of the services and has documented them in accordance with my direction.      Divina Jolley M.D.  Emergency Medicine  [facility] EMERGENCY ROOM  [address] [address]  [phone]  Dept: [phone]     Divina Jolley MD  09/06/22 0543       Divina Jolley MD  09/06/22 0554

## 2022-09-07 NOTE — PROGRESS NOTES
[facility]  Palliative Care Daily Progress Note       Recommendations & Counseling     Acute on chronic pain, likely from fall, possibly related to neoplasm    S/p Bx 8/28 which showed low grade renal cell carcinoma. Done since patient complained of flank pain and CT showed renal mass.     Suffered from falls and hit head prior to this admission    Per chart review, was somewhat sedated due to Dilaudid 0.5 mg IV x1    Not able to give NSAIDs due to CKD    Tylenol 1000 mg po scheduled changed to q8h prn pain per patient preference    Primary team stopped Dilaudid as pain seems chronic. Lowered gabapentin as this could be contributing to falls due to decreased clearance from CKD    Defer future management to primary team     Encounter for palliative care  Psychosocial support    Psychosocial support was provided to patient and/or family.    Prognosis: poor to fair    Palliative performance scale (PPS): 50% ??? (patient's BP was too high per PT to try and stand up today)    CODE status: AND, okay with intubation for now Soto stated that he knew CPR could cause some of his ribs to break and did NOT want his ribs broken. He initially stated that he did not want to have tube stuck down his throat but then was unsure if he would want short-term intubation or not. Still deciding.    Goals of Care: Disease-oriented     Patient stated he was tired and deferred care discussions to Harsh    Went through advanced care document and POLST with Harsh. She will speak to Soto about these further    Disposition: Inpatient per primary     Harsh states she will take time and go through documents with Soto. Counseled harsh that she can get directive notarized via Notary cam through  while hospitalized but can also do so via any notary service e.g. at a [employer]. Palliative medicine will sign-off as advanced care planning has been performed.     These recommendations have  been relayed via this note to primary team.       Thank you for the opportunity to participate in the care of this patient and family. Our team: will continue to follow.     During regular M-F work hours -- if you are not sure who specifically to contact -- please contact us by sending a text page to our team consult pager at [phone].    After regular work hours and on weekends/holidays, you can call our answering service at [phone]. Also, who's on call for us is available in [employer].      Total time spent was >35 minutes,  >50% of time was spent counseling and/or coordination of care regarding goals of care and symptom control.     Isacc Tuttle DO / Palliative Medicine / Text me via [facility].    Opioid Safety Discussion: We discussed opioid safety with them, including side effects and potential harms of opioids, taking opioids exactly as prescribed, watching for mood-altering effects of opioids and letting a provider know if they notice any, driving safety, safe opioid storage and disposal.       Assessments          Soto Gibbs is a 82 year old male who was seen at OSH for striking his head against a table after falling and [facility] to [facility] on 9/6 as CT Head showed 8mm subdural hematoma from fall with otherwise negative imaging for fractures.  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  Mood, coping, and/or meaning in the context of serious illness were addressed today: No.  Summary/Comments: as above            Interval History:     Soto was seen and examined at bedside. Daughter present as well. Saying he does not need scheduled pain medication and requesting Tylenol be moved to PRN. Saying he feels much better today than  yesterday and also looks to be more lucid. Off BiPAP on nasal cannula. Details about advanced care planning above. No other needs per patient. Of note, patient is being worked up by urology in setting of renal cell.    Key Palliative Symptoms:  # Pain severity the last 12 hours: low  We are not managing dyspnea in this patient  We are not managing nausea in this patient  We are not managing anxiety in this patient    Patient is on opioids: assessed and bowels ok/no needed changes to plan of care today.           Review of Systems:     Besides above, >4 ROS was reviewed and is unremarkable          Medications:     I have reviewed this patient's medication profile and medications during this hospitalization.    Noted meds:    APAP 975 mg po q8h scheduled changed to prn only per patient preference  Neurontin 300 mg po at bedtime  Dilaudid stopped per primary team burt pain is chronic  Robaxin prn  Percocet 5-325 mg po q6h prn pain           Physical Exam:   Vitals were reviewed  Temp: 97.9  F (36.6  C) Temp src: Oral BP: (!) 191/87 Pulse: 76   Resp: 20 SpO2: 98 % O2 Device: Nasal cannula Oxygen Delivery: 1 LPM   General: Not in acute distress.  Head: Atraumatic. Normocephalic.   Eyes: Anicteric without injection. Eyes conjugate. Extraocular movements intact.  Ear, Nose, and Throat: Mouth pink without lesions. Neck supple without masses, thyromegaly, or LAD. Dry from BiPAP  Pulmonary: Unlabored. Clear to auscultation bilaterally. Speaking in full sentences. Nasal cannula  Cardiovascular:No jugular venous distension. Mild b/l LE edematous.  Abdomen: Soft. Non-tender to palpation. Normal bowel sound present.  Skin: Warm. Dry. Lower extremity skin changes.  Musculoskeletal: Joints of hand normal. Muscle atrophy.  Neuro: Speech fluent though difficult with BiPAP. Face symmetric. Decreased LUE strength. No focal neurologic deficit noted. Alert and oriented  Psych: Normal mood and affect. Still defer a lot of questions and  care to daughter             Data Reviewed:     Reviewed recent pertinent imaging, comments:   XR Chest Port 1 View [[id number]] Resulted: 09/07/22 1328   IMPRESSION: Possible central atelectasis or minimal edema.   Atherosclerosis.       Reviewed recent labs, comments:   ROUTINE ICU LABS (Last four results)  CMPRecent Labs   Lab 09/07/22  1651 09/07/22  0800 09/07/22  0449 09/06/22  2201 09/06/22  1606 09/06/22  1444 09/06/22  0401   NA  --   --  141  --   --  143 142   POTASSIUM  --   --  3.9  --   --  4.3 4.1   CHLORIDE  --   --  99  --   --  97* 94*   CO2  --   --  34*  --   --  35* 40*   ANIONGAP  --   --  8  --   --  11 8   * 135* 144* 136*   < > 144* 117   BUN  --   --  35.9*  --   --  36.3* 40*   CR  --   --  1.65*  --   --  1.71* 2.07*   GFRESTIMATED  --   --  41*  --   --  40* 32*   LEENA  --   --  9.0  --   --  9.4 9.4   MAG  --   --  2.0  --   --   --   --    PHOS  --   --  1.8*  --   --   --   --    PROTTOTAL  --   --   --   --   --   --  7.7   ALBUMIN  --   --   --   --   --   --  3.0*   BILITOTAL  --   --   --   --   --   --  0.4   ALKPHOS  --   --   --   --   --   --  67   AST  --   --   --   --   --   --  12   ALT  --   --   --   --   --   --  <9    < > = values in this interval not displayed.     CBC  Recent Labs   Lab 09/07/22  1824 09/07/22 0449 09/06/22  0401   WBC  --  8.1 8.2   RBC  --  4.00* 3.91*   HGB 11.4* 11.0* 10.8*   HCT  --  37.5* 36.8*   MCV  --  94 94   MCH  --  27.5 27.6   MCHC  --  29.3* 29.3*   RDW  --  14.7 14.6   PLT  --  277 280     INR  Recent Labs   Lab 09/06/22  0401   INR 1.10     Arterial Blood Gas  Recent Labs   Lab 09/07/22  1824 09/07/22  0448 09/06/22 2041 09/06/22  1251   PH  --   --   --  7.38   PCO2  --   --   --  68*   PO2  --   --   --  67*   HCO3  --   --   --  41*   O2PER 30 21 30 25

## 2022-09-07 NOTE — PLAN OF CARE
Neuro: A&Ox4.  Occasional moaning.  Follows commands and appropriate call light use.  Neuros remain intact, see flowsheet.  Cardiac: SR. BP in varied in 160s/70s.  Max SBP 190s.  Gave Hydralazine x2 and Labetalol x3.  Lisinopril and Atenolol added.  Respiratory: Sating above 92% on 2L NC.  GI/: Mulitple small voids of red urine that began at 0900.  Clots have resolved since this morning.  Urine still pink/red but improving.  Bladder scanned q4h.   Diet/appetite: Denied appetite until this evening.  Regular diet.  BG ACHS.  Activity:  Assist of 2 in bed.  Pt has been unable to move from bed due to unstable Bps.  Pain: No complaints of pain all day.  Skin: No new deficits noted.  LDA's: L PIV SL.    Plan: PT/OT tmrw.  Bladder scan if unable to void.

## 2022-09-07 NOTE — PROGRESS NOTES
09/07/22 1000   Quick Adds   Type of Visit Initial PT Evaluation   Living Environment   People in Home friend(s)   Current Living Arrangements house   Home Accessibility stairs within home   Number of Stairs, Within Home, Primary eight   Stair Railings, Within Home, Primary railing on right side (ascending)   Transportation Anticipated family or friend will provide  (Pt had license but has not renewed d/t needing SS card, friend/roommate drives him)   Living Environment Comments Pt lives with friend who rents from him in a house, no TONY. 8 stairs to go up in the house, 4 stairs to go down into basement.   Self-Care   Usual Activity Tolerance moderate   Current Activity Tolerance fair   Regular Exercise No   Equipment Currently Used at Home walker, rolling   Fall history within last six months yes   Number of times patient has fallen within last six months 6   Activity/Exercise/Self-Care Comment Pt reports never having regularly exercised. Has had 6-7 falls within the last week   General Information   Onset of Illness/Injury or Date of Surgery 09/06/22   Referring Physician Estefania Hernández APRN CNP   Patient/Family Therapy Goals Statement (PT) Wants to return home   Pertinent History of Current Problem (include personal factors and/or comorbidities that impact the POC) Soto Gibbs is a 82 year old male with PMHx of HTN, CKD (stage 3), COPD, DM2, and frequent falls.  He presented to [facility] ED following a fall last night.  He reports hitting his head but denies loss of consciousness. At [facility] Head CT revealed a right falx acute subdural hematoma thickest anteriorly measuring 8 mm with no significant midline shift.  He was transferred to [facility] for further evaluation. He received IV dilaudid for headache prior to arrival at [facility]. His exam is limited s/t to sedation. He is able to follow commands and answer questions with repeated stimuli.   Existing Precautions/Restrictions fall   Cognition    Affect/Mental Status (Cognition) WFL   Pain Assessment   Patient Currently in Pain No   Integumentary/Edema   Integumentary/Edema no deficits were identifed   Posture    Posture Comments Not formally assessed as pt could not get up d/t high BP   Range of Motion (ROM)   ROM Comment Not formally assessed   Strength (Manual Muscle Testing)   Strength Comments B LE sagittal plane strength tested in sidelying (gravity eliminated position), B LE 2+/5   Bed Mobility   Comment, (Bed Mobility) Pt is able to assist with rolling by grabbing bed rails, Antolin x1.   Transfers   Comment, (Transfers) Not formally assessed d/t pt's BP being too high   Gait/Stairs (Locomotion)   Comment, (Gait/Stairs) Not formally assessed d/t pt's BP being too high   Sensory Examination   Sensory Perception patient reports no sensory changes   Clinical Impression   Criteria for Skilled Therapeutic Intervention Yes, treatment indicated   PT Diagnosis (PT) impaired functional mobility   Influenced by the following impairments strength, deconditioning, medical status, fall risk   Functional limitations due to impairments functional independence, activity tolerance   Clinical Presentation (PT Evaluation Complexity) Evolving/Changing   Clinical Presentation Rationale clinical judgement   Clinical Decision Making (Complexity) moderate complexity   Planned Therapy Interventions (PT) balance training;bed mobility training;gait training;home exercise program;patient/family education;stair training;transfer training;risk factor education;progressive activity/exercise   Anticipated Equipment Needs at Discharge (PT) walker, rolling   Risk & Benefits of therapy have been explained evaluation/treatment results reviewed;participants included;patient   Clinical Impression Comments D/t pt's BP being above threshold of systolic 140, unable to complete full objective exam within eval. Pt demonstrated ability to assist with rolling and 2+/5 B LE strength in sidelying.    PT Discharge Planning   PT Discharge Recommendation (DC Rec)   (Defer until OOB can be assessed)   PT Rationale for DC Rec D/t pt's BP being elevated abovesystolic threshold of 140, unable to complete objective portion of eval. Once BP is controlled, will assess OOB mobility.   PT Brief overview of current status Rolling with Antolin x1   Total Evaluation Time   Total Evaluation Time (Minutes) 15   Physical Therapy Goals   PT Frequency 5x/week   PT Predicted Duration/Target Date for Goal Attainment 09/14/22   PT Goals Bed Mobility;Transfers;Gait;Stairs   PT: Bed Mobility Modified independent;Supine to/from sit   PT: Transfers Modified independent;Sit to/from stand;Bed to/from chair;Within precautions   PT: Gait Modified independent;Rolling walker;100 feet;Within precautions   PT: Stairs 8 stairs;Modified independent;Rail on right;Within precautions

## 2022-09-07 NOTE — PROVIDER NOTIFICATION
Time of notification: 0930 AM  Provider notified: Estefania Hernández   Patient status: Patient having bloody urine after straight cath attempt overnight.   Orders received: Will continue to monitor and bladder scan as needed.

## 2022-09-07 NOTE — CONSULTS
"Urology Consult History and Physical    Name: Soto Gibbs    MRN: [id number]   YOB: 1940       We were asked to see Soto Gibbs at the request of DEAN Quiles CNP for evaluation and treatment of the following chief complaint:          Chief Complaint:   - Hematuria with clots following attempted Hill placement, incomplete emptying  History is obtained from patient and review of records          History of Present Illness:   Soto Gibbs is a 82 year old male with pmh significant for HTN, HLD, COPD, FORTUNATO, right renal mass s/p biopsy 8/28/22, CKD 3, weakness who was transferred from [facility] to [facility] following a mechanical fall.  He takes asa 81mg at home but no other AC.  He has been found to have an 8mm SDH of the right falx. He is on seizure prophylaxis.      Per d/w the primary team he was having frequent voids and straight catheterization was attempted unsuccessful for bladderscan suspicion of incomplete emptying (bladderscan 422cc). Since then he has been passing clots and having small incontinent episodes. Bladderscan at 5am today showed 156cc. Most recent postvoid 91cc.   He is voiding frequently, but patient sts this is his baseline.  Pt feels he is voiding normally and denies dysuria, bladder and flank pain. HGB stable at 11g/dL.  Had hematuria in conjunction with renal biopsy on 8/25 - this resolved quickly - otherwise has had no previous episodes of gross hematuria.  No UTIs.     Review of EMR shows he had a 5cm right renal mass for which he has previously seen Dr. Erickson in May 2022.  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  Unclear what urologic follow up is planned at this time - pt sts \"I haven't heard anything\".          Past Medical History:   No past medical history on file.         Past Surgical History:     Past Surgical History:   Procedure Laterality Date     ABDOMEN SURGERY   "    Hernia     COLON SURGERY      Polyps.            Social History:     Social History     Tobacco Use     Smoking status: Former Smoker     Quit date: 1985     Years since quittin.5     Smokeless tobacco: Never Used   Substance Use Topics     Alcohol use: No   Smoked for 3 years total         Family History:   No family history on file.  No FHx of malignancy         Allergies:     Allergies   Allergen Reactions     Morphine (Pf) [Morphine] Other (See Comments) and Dizziness     Hallucinations            Medications:     Current Facility-Administered Medications   Medication     acetaminophen (TYLENOL) tablet 975 mg     albuterol (PROVENTIL HFA/VENTOLIN HFA) inhaler     albuterol (PROVENTIL) neb solution 2.5 mg     atenolol (TENORMIN) tablet 25 mg     bisacodyl (DULCOLAX) suppository 10 mg     carboxymethylcellulose PF (REFRESH PLUS) 0.5 % ophthalmic solution 1 drop     gabapentin (NEURONTIN) capsule 300 mg     HOLD: All Oral Medications     hydrALAZINE (APRESOLINE) injection 20 mg     labetalol (NORMODYNE/TRANDATE) injection 10 mg     levETIRAcetam (KEPPRA) tablet 500 mg     lisinopril (ZESTRIL) tablet 20 mg     methocarbamol (ROBAXIN) tablet 500 mg     naloxone (NARCAN) injection 0.2 mg    Or     naloxone (NARCAN) injection 0.4 mg    Or     naloxone (NARCAN) injection 0.2 mg    Or     naloxone (NARCAN) injection 0.4 mg     No lozenges or gum should be given while patient on BIPAP/AVAPS/AVAPS AE     ondansetron (ZOFRAN ODT) ODT tab 4 mg    Or     ondansetron (ZOFRAN) injection 4 mg     oxyCODONE-acetaminophen (PERCOCET) 5-325 MG per tablet 1 tablet     Patient is already receiving mechanical prophylaxis     polyethylene glycol (MIRALAX) Packet 17 g     potassium & sodium phosphates (NEUTRA-PHOS) Packet 2 packet     prochlorperazine (COMPAZINE) injection 5 mg    Or     prochlorperazine (COMPAZINE) tablet 5 mg    Or     prochlorperazine (COMPAZINE) suppository 12.5 mg     umeclidinium (INCRUSE ELLIPTA) 62.5  MCG/INH inhaler 1 puff             Review of Systems:    ROS: See HPI for pertinent details.  Remainder of 10-point ROS negative.         Physical Exam:   VS:  T: 98.6    HR: 81    BP: 156/69    RR: 24   GEN:  AOx3.  NAD.  Pleasant.  HEENT:  Sclerae anicteric.  Wearing BIPAP O2  LUNGS: Non-labored breathing. Wearing BiPAP O2  BACK:  No costoverterbral tenderness.  ABD:  Soft.  NT.  ND.  No rebound or guarding.  + low midline surgical scar well healed.   :  Phallus circumcised.  Meatus patent without bleeding, discharge or evidence of trauma. Normal penile shaft without plaques.  Testicles descended bilaterally, no tenderness.  Epididymes non-tender. No inguinal hernias.  GAVIN:  Deferred  EXT:  Warm, well perfused.    SKIN:  Warm.  Dry.  No rashes.  NEURO:  CN grossly intact.      URINE: Juan-aid light red with a few dark subcentimeter clots          Data:   All laboratory data reviewed:    Lab Results   Component Value Date    PSA 6.45 12/03/2021    PSA 5.3 03/27/2019    PSA 3.1 11/01/2016    PSA 2.6 09/28/2015     Recent Labs   Lab 09/07/22  0449 09/06/22  0401   WBC 8.1 8.2   HGB 11.0* 10.8*    280     Recent Labs   Lab 09/07/22  0800 09/07/22  0449 09/06/22  2201 09/06/22  1917 09/06/22  1606 09/06/22  1444 09/06/22  0401   NA  --  141  --   --   --  143 142   POTASSIUM  --  3.9  --   --   --  4.3 4.1   CHLORIDE  --  99  --   --   --  97* 94*   CO2  --  34*  --   --   --  35* 40*   BUN  --  35.9*  --   --   --  36.3* 40*   CR  --  1.65*  --   --   --  1.71* 2.07*   * 144* 136* 174*   < > 144* 117   LEENA  --  9.0  --   --   --  9.4 9.4   MAG  --  2.0  --   --   --   --   --    PHOS  --  1.8*  --   --   --   --   --     < > = values in this interval not displayed.     Recent Labs   Lab 09/06/22  0602   COLOR Light Yellow   APPEARANCE Clear   URINEGLC Negative   URINEBILI Negative   URINEKETONE Negative   SG 1.015   URINEPH 5.5   PROTEIN 70 *   NITRITE Negative   LEUKEST Negative   RBCU 5*   WBCU 2      No results found for this or any previous visit.    All pertinent imaging reviewed:         Impression and Plan:   Impression / Plan:   Soto Gibbs is a 82 year old male Soto Gibbs is a 82 year old male with pmh significant for HTN, HLD, COPD, FORTUNATO, right renal mass s/p biopsy 8/28/22, CKD 3, weakness who was transferred from [facility] to [facility] following a mechanical fall.  He takes asa 81mg at home but no other AC.      He has been admitted with an 8mm SDH of the right falx following a mechanical fall. ASA is being held.      Urology has been consulted for gross hematuria that began overnight following an unsuccessful traumatic straight-catheterization attempt.  HGB stable and patient is emptying normally (PVR 93cc).  Incidentally, patient also has a 5cm renal mass, biopsy path from 8/25/22 showing oncocytic tumor.        - Given that hemoglobin is stable and patient is emptying his bladder normally, we do not recommend Hill placement, manual irrigation, continuous irrigation or cystoscopy at this time.   - will obtain urinalysis for completeness (ordered)   - Push fluids (IV vs PO) to dilute the hematuria  - Continue to monitor serial PVRs until hematuria clears  - Rack urine (save a urine sample in a specimen cup twice per shift x 24 hours) to assess for improvement in degree of hematuria.  Store sample cups in the bathroom so Urology can monitor.   - Trend hemoglobin as you are  - If things change and patient becomes unable to empty his bladder, would be fine for nursing to place a 16Fr COUDE Hill catheter (please avoid 14Fr straight tipped catheters from the straight catheter kits).  Also, re-involve Urology to consider bladder irrigation to evacuate any clots.    - Will arrange follow up with Dr. Erickson at [facility] to review right renal biopsy results.     Urology will follow to monitor urine color.   Discussed with Dr. wick    Thank you for the opportunity to participate in the  care of Soto Gibbs.     Jazzy Delgado PA-C  Urology Physician Assistant  Personal Pager: [phone]    Please call Job Code:   [phone] to reach the Urology resident or PA on call - Weekdays  [phone] to reach the Urology resident or PA on call - Weeknights and weekends

## 2022-09-07 NOTE — PROVIDER NOTIFICATION
Notified MD BP continues to be above 140 PRN hydralazine given x3 with minimal effect. Spoke to Dr. Byers about the above plan to give AM atenolol early with no additional PRN BP meds.

## 2022-09-07 NOTE — PHARMACY-ADMISSION MEDICATION HISTORY
Admission Medication History Completed by Pharmacy    See [facility] Admission Navigator for allergy information, preferred outpatient pharmacy, prior to admission medications and immunization status.     Medication History Sources:     Patient reported, dispense report     Changes made to PTA medication list (reason):      Added:   o Hydrochlorothiazide 25mg tablet - take one tablet PO every day   o Tiotropium-olodaterol 2.5-2.5mcg/act inhaler - inhale 2 puffs by mouth every day      Deleted:  o Omeprazole 20mg capsule - take 20mg PO before supper (no longer taking)  o Triamterene-hydrochlorothiazide 37.5-25mg capsule - take 1 capsule PO QAM (no longer taking)  o Tiotropium 18mcg inhalation capsule - inhale 1 capsule (2 puffs) every day (no longer taking)       Changed:   o Gabapentin 300 mg capsule take 300mg PO TID ---> take 300mg PO BID  (frequency change)    Additional Information:    Patient was able to reliably report his current medications and most recent doses     Prior to Admission medications    Medication Sig Last Dose Taking? Auth Provider Long Term End Date   albuterol (PROVENTIL HFA;VENTOLIN HFA) 90 mcg/actuation inhaler [ALBUTEROL (PROVENTIL HFA;VENTOLIN HFA) 90 MCG/ACTUATION INHALER] Inhale 2 puffs every 6 (six) hours as needed for wheezing or shortness of breath. More than a month Yes Caity Jung MD     albuterol (PROVENTIL) 2.5 mg /3 mL (0.083 %) nebulizer solution [ALBUTEROL (PROVENTIL) 2.5 MG /3 ML (0.083 %) NEBULIZER SOLUTION] Take 3 mL (2.5 mg total) by nebulization every 4 (four) hours as needed for shortness of breath. More than a month Yes Juan Pineda MD     aspirin 81 MG EC tablet [ASPIRIN 81 MG EC TABLET] Take 81 mg by mouth every evening.  9/4/2022 at PM Yes Provider, Historical     atenolol (TENORMIN) 25 MG tablet [ATENOLOL (TENORMIN) 25 MG TABLET] Take 1 tablet (25 mg total) by mouth every morning. 9/5/2022 Yes Juan Pineda MD     gabapentin (NEURONTIN) 300 MG capsule Take  300 mg by mouth 2 times daily 9/5/2022 Yes Reported, Patient Yes    hydrochlorothiazide (HYDRODIURIL) 25 MG tablet Take 25 mg by mouth daily 9/5/2022 Yes Unknown, Entered By History Yes    lisinopril (PRINIVIL,ZESTRIL) 20 MG tablet [LISINOPRIL (PRINIVIL,ZESTRIL) 20 MG TABLET] Take 20 mg by mouth every morning. 9/5/2022 Yes Provider, Historical     oxyCODONE-acetaminophen (PERCOCET) 5-325 mg per tablet [OXYCODONE-ACETAMINOPHEN (PERCOCET) 5-325 MG PER TABLET] Take 1 tablet by mouth every 4 (four) hours as needed for pain. 9/5/2022 Yes Provider, Historical     simvastatin (ZOCOR) 20 MG tablet Take 20 mg by mouth every evening 9/4/2022 Yes Provider, Historical     tiotropium-olodaterol 2.5-2.5 MCG/ACT AERS Inhale 2 puffs into the lungs daily 9/5/2022 Yes Unknown, Entered By History     OXYGEN-AIR DELIVERY SYSTEMS MISC [OXYGEN-AIR DELIVERY SYSTEMS MISC] Inhale 2 L/min continuous. Indications: copd, sleep apnea   Juan Pineda MD           Date completed: 09/07/22    Medication history completed by: Dickson Redmond, Pharmacy Intern

## 2022-09-07 NOTE — PROGRESS NOTES
[facility]   Tertiary Survey Progress Note     Date of Service (when I saw the patient): 09/07/2022     Assessment & Plan   Trauma mechanism:Mechanical fall  Time/date of injury:09/06 early am  Known Injuries:  1. Right falx 8mm SDH, no MLS  Other diagnoses   # Hx COPD on 2L NC at home  # FORTUNATO  # Falls,weakness  # Right renal mass, CKD 3  # DM2    Neuro/Pain:  # Traumatic 8mm right falx SDH  Neurosurgery following  Follow up CT head completed: Stable 8mm SDH without MLS  No reported neurological changes overnight   No acute interventions planned at this time  Encephalopathy is resolved presumed secondary to hypercarbic respiratory acidosis, opoids, improved with BIPAP. See resp section  - Plan:  Hold aspirin and any anticoagulant medications   Keppra 500 mg BID for seizure ppx x 7 days  Maintain SBP < 140, resumed home Lisinopril, added PRN Labetalol   Follow-up with Neurosurgery in 2 weeks with repeat head CT    # Acute on chronic radiculopathy pain  PTA on Gabapentin 300mg three times a day for right lower extremity radiculopathy pain and Percocet.   Given the recent report of multiple falls, weakness, ELISABETH on CKD, and poor appetite possible reduced clearance with reduced kidney function may be contributing to weakness and falls. This was discussed with the patient.   Pain management plan:  - Discontinued IV dilaudid, appears pain is chronic. Will re-evaluate as needed.  - Continue scheduled tylenol  - Continue PRN Percocet  - Willing to try Gabapentin at bedtime, hold daytime doses for now.  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  - Aggressive pulmonary hygiene. Incentive spirometer while awake     Cardiovascular:    #HTN   # HLD  PTA meds: Lisinopril, Atenolol, Triamterene/HCTZ, Aspirin  Hold Triamterene/HCTZ for now, resolving ELISABETH  Continue Atenolol, ACEi,  Aspirin on hold due to intracranial hemorrhage until follow up    GI/Nutrition:    Pt reports that his appetite has been very poor over the last 2-3months. He states he eats a cookie for breakfast, bread for lunch and a salad and chicken occasionally for dinner. He states he has access to food and his room mate makes the meals. He is not vishnu why his appetite has been poor. He denies significant weight los, \"maybe 1pound\"  Denies nausea, abdominal pain  - Nutritional consult eval for possible supplement, education    Renal/ Fluids/Electrolytes:  # Acute on chronic kidney injury  # Mild hypophosphatemia  # Right renal mass, S/P biopsy  08/25  Baseline creatinine 1.5-1.77, admitted with a creatinine of 2.07.  MIVF overnight, held ACEI and thiazide diuretic with improvement to 1.65.  - UOP is adequate  Hematuria:possible trauma with straight cath attempt. Please notify team if unable to void with high bladder scan residual. May require ramos +/- urology consult.  - continue follow up with  Dr. Pierce OP for biopsy results and plan  - electrolyte replacement protocol  In place.     Endocrine:   # Diabetes Mellitus   - PTA medications: none   Monitor glucose levels x 48hrs, will initiate a sliding scale if >180/dL    Infectious disease:    No indications for antibiotics.   Afebrile, WBC WNL    Hematology:    Anemia of chronic illness  - Admitted with Hb of 10.8, plt 27.   - Threshold for transfusion if hgb <7.0 or signs/symptoms of hypoperfusion.       Musculoskeletal:  #Falls, " deconditioned  # Radiculopathy pain, chronic to BLE  # Hx Lumbar surgery  - Physical and occupational therapy consults.    Lines/ tubes/ drains:  - PIV  Code status: DNR/ pre-arrest intubation OK  General Cares:    PPI/H2 blocker:  n/a   DVT prophylaxis: Mechanical 2/2 ICH   Bowel Regimen/Date of last stool: PA   Pulmonary toilet: IS   ETOH screen completed: yes   Lines / drains: PIV  Discharge goals:     Adequate pain management: yes    VSS x24 hours: yes    Hemoglobin stable x 48 hours:n/A    Ambulating safely and/or therapy evals complete: pending    Drains/lines removed or plan in place to manage: yes    Teaching done: ongoing    Other:  Expected D/C date: PT eval, anticipated 09/08    Interval History   Denies increased pain, feels his breathing is at baseline    Review Of Systems  Skin: negative  Eyes: negative  Ears/Nose/Throat: negative  Respiratory: Dyspnea on exertion at baseline, uses home oxygen and CPAP with naps and at bedtime  Cardiovascular: negative  Gastrointestinal: negative  Genitourinary: Recent kidney biopsy  Musculoskeletal: back pain and joint pain  Neurologic: negative  Psychiatric: negative  Hematologic/Lymphatic/Immunologic: negative  Endocrine: diabetes    Physical Exam     Spivey Coma Scale - Total 15/15  Eye Response (E): 4   4= spontaneous, 3= to verbal/voice, 2= to pain, 1= No response   Verbal Response (V): 5   5= Orientated, converses, 4= Confused, converses, 3= Inappropriate words, 2= Incomprehensible sounds, 1=No response   Motor Response (M): 6   6= Obeys commands, 5= Localizes to pain, 4= Withdrawal to pain, 3=Fexion to pain, 2= Extension to pain, 1= No response     Frailty Questionnaire: To be done for all patients age 60+. To be completed on admission or with the tertiary exam  F (Fatigue): Is the patient easily fatigued? YES = 1  R (Resistance): Is the patient unable to walk one flight of stairs? YES = 1  A (Ambulation): Is the patient unable to walk one block? YES = 1  I   (Illness): Does the patient have more than five illnesses? YES = 1  L (Loss of weight): Has the patient lost more than 5% of weight in the past 6 months. NO = 0  Lost five pounds or more in the last 3 months without trying? AND/OR Unintended weight loss?  Does the patient have difficulty performing housework such as washing windows or scrubbing floors? AND Activity in a typical 24-hour day- No moderate or vigorous activity    Score: 4    Score:3-5: PLAN: Palliative Care Consult, PT/OT Consult, consider PM&R, Delirium Prevention Strategies                           Physical Exam  Vitals and nursing note reviewed.   HENT:      Head: Normocephalic.      Nose: Nose normal.      Mouth/Throat:      Mouth: Mucous membranes are moist.   Eyes:      Pupils: Pupils are equal, round, and reactive to light.   Cardiovascular:      Rate and Rhythm: Normal rate.      Pulses: Normal pulses.   Pulmonary:      Effort: Pulmonary effort is normal. No respiratory distress.      Breath sounds: No wheezing.   Abdominal:      General: There is no distension.      Palpations: Abdomen is soft.   Musculoskeletal:         General: Normal range of motion.      Cervical back: Normal range of motion.   Skin:     General: Skin is warm and dry.      Capillary Refill: Capillary refill takes less than 2 seconds.   Neurological:      General: No focal deficit present.      Mental Status: He is oriented to person, place, and time.   Psychiatric:         Mood and Affect: Mood normal.       Temp: 98.6  F (37  C) Temp src: Oral BP: (!) 156/69 Pulse: 81   Resp: 24 SpO2: 97 % O2 Device: Nasal cannula Oxygen Delivery: 1 LPM  Vitals:    09/06/22 0827 09/06/22 1800 09/07/22 0427   Weight: 122.5 kg (270 lb) 118.7 kg (261 lb 11 oz) 118.7 kg (261 lb 11 oz)     Vital Signs with Ranges  Temp:  [97.5  F (36.4  C)-98.9  F (37.2  C)] 98.6  F (37  C)  Pulse:  [74-96] 81  Resp:  [16-28] 24  BP: (108-167)/(54-91) 156/69  FiO2 (%):  [21 %-40 %] 21 %  SpO2:  [90 %-100 %] 97  %  I/O last 3 completed shifts:  In: 1736.25 [P.O.:240; I.V.:1496.25]  Out: 925 [Urine:925]      DEAN Holt CNP  To contact the trauma service use job code pager [phone],   Numeric texts or alpha text through [facility]     Cyclophosphamide Pregnancy And Lactation Text: This medication is Pregnancy Category D and it isn't considered safe during pregnancy. This medication is excreted in breast milk.

## 2022-09-07 NOTE — PROVIDER NOTIFICATION
Notified Dr. Byers of continued high SBP into the 160s , scheduled atenolol given with no effect. Not due for PRN hydralazine until 0800. Plan to order more BP meds.

## 2022-09-07 NOTE — PLAN OF CARE
Neuro: Mental status waxes and wants; lethargic to moaning. Oriented x3; denies headache and nausea.. Pupil 2mm and sluggish, upper arms strength 4/5 and lower extremities 3/5.   Cardiac: SR.SBP goal <140 not met. PRN hydralazine given with minimal effects, MD was notified  and atenolol given early.    Respiratory: Sating 92-94% switching between BiPaP and 1-2 L NC. CO2 trending down    GI/: No BM this shift. Episode of Incontinent of urine with hesitancy.  Diet/appetite: Regular diet.   Activity:  Bedrest for lower extremity weakness; waiting for PT and OT assessment.  Pain: Prn robaxin and scheduled tylenol given.  Skin: No new deficits noted.  LDA's: R-PIV    Plan: Continue with POC. Notify primary team with changes.        Problem: Hypertension Comorbidity  Goal: Blood Pressure in Desired Range  Outcome: Unable to Meet, Plan Revised  Intervention: Maintain Blood Pressure Management  Recent Flowsheet Documentation  Taken 9/7/2022 0400 by Parish Diggs RN  Medication Review/Management: medications reviewed    Problem: Fall Injury Risk  Goal: Absence of Fall and Fall-Related Injury  Outcome: Ongoing, Progressing  Intervention: Identify and Manage Contributors  Recent Flowsheet Documentation  Taken 9/7/2022 0400 by Parish Diggs RN  Medication Review/Management: medications reviewed  Taken 9/7/2022 0000 by Parish Diggs, RN  Medication Review/Management: medications reviewed  Taken 9/6/2022 2000 by Parish Diggs, RN  Medication Review/Management: medications reviewed  Intervention: Promote Injury-Free Environment  Recent Flowsheet Documentation  Taken 9/7/2022 0400 by Parish Diggs, RN  Safety Promotion/Fall Prevention:   bed alarm on   activity supervised   room near nurse's station   room door open    Problem: COPD (Chronic Obstructive Pulmonary Disease) Comorbidity  Goal: Maintenance of COPD Symptom Control  Outcome: Ongoing, Progressing  Intervention: Maintain COPD-Symptom  Control  Recent Flowsheet Documentation  Taken 9/7/2022 0400 by Parish Diggs RN  Medication Review/Management: medications reviewed    Problem: Obstructive Sleep Apnea Risk or Actual Comorbidity Management  Goal: Unobstructed Breathing During Sleep  Outcome: Ongoing, Progressing                No

## 2022-09-07 NOTE — PROGRESS NOTES
Brief trauma note  Passing clots in urine and bloody  Incontinent  Reports sensation of incomplete emptying  Urology consulted  PRN Hydromorphone, anticipate Hill for possible CBI  - CXR, shortness of breath    Reviewed multiple outpatient clinic vital signs and SBP mostly runs in the 170's, RR 20's this change the PRN indication for Labetalol and Hydralazine to treat if SBP >160    Estefania Hernández CNP

## 2022-09-08 NOTE — PLAN OF CARE
Discharged to: (place) at (time) Home @ 2936  Belongings: All belongings sent with patient and daughter  AVS (After Visit Summary) discussed with: patient and family

## 2022-09-08 NOTE — PROGRESS NOTES
"Urology  Progress Note    NAEO  Urine is watermelon in character    Exam  BP (!) 147/82 (BP Location: Right arm)   Pulse 86   Temp 98.1  F (36.7  C) (Oral)   Resp 22   Ht 1.905 m (6' 3\")   Wt 118.7 kg (261 lb 11 oz)   SpO2 96%   BMI 32.71 kg/m    No acute distress  Unlabored breathing  Watermelon colored urine in urinal     UOP 1600/400    Labs  Recent Labs   Lab Test 09/07/22  1824 09/07/22  0449 09/06/22  1444 09/06/22  0401 05/20/22  1319   WBC  --  8.1  --  8.2 6.7   HGB 11.4* 11.0*  --  10.8* 12.6*   CR  --  1.65* 1.71* 2.07* 1.77*        AM labs pending    Assessment/Plan  82 year old y/o male that urology as consulted for gross hematuria after traumatic ramos catheter placement. No significant changes and his urine continues to drain.     - patient continues to void with low PVRs and urine character not concerning currently  - No changes to recommendations mentioned in the prior consult note  - Follow-up to be scheduled with Dr. Erickson at [facility] to discuss renal biopsy results   - urology to sign off; please reach out with any questions or concerns      Seen and examined with the chief resident. Will discuss with Dr. Walter.    Josep Najera MD  Urology Resident     Contacting the Urology Team     Please use the following job codes to reach the Urology Team. Note that you must use an in house phone and that job codes cannot receive text pages.     On weekdays, dial [phone] (or star-star-star [phone] on the new [employer] telephones) then [phone] to reach the Adult Urology resident or PA on call    On weekdays, dial [phone] (or star-star-star [phone] on the new [employer] telephones) then [phone] to reach the Pediatric Urology resident    On weeknights and weekends, dial [phone] (or star-star-star [phone] on the new [employer] telephones) then [phone] to reach the Urology resident on call (for both Adult and Pediatrics)              "

## 2022-09-08 NOTE — DISCHARGE SUMMARY
[facility]    Discharge Summary  Trauma Service     Date of Admission:  9/6/2022  Date of Discharge:  No discharge date for patient encounter.  Attending Physician: Dr. Sushant Chong  Discharging Provider: Remedios TODD  Date of Service (when I saw the patient): 09/08/22    Primary Provider: No Ref-Primary, Physician  Primary Care clinic: No address on fileservation  Phone: None  Fax number: [phone]     Discharge Diagnoses   Subdural Hematoma   Fall      Hospital Course   Traumatic Injury  The patient sustained the above injury as a result of fall.  The mechanism of injury and factors contributing to the accident were discussed with the patient.  Strategies on how to prevent future accidents were reviewed.  The patient underwent tertiary examination to evaluate for additional injuries.  The systematic review did not find any other injuries.    Neurosurgery Head Injury  #Traumatic 8mm right falx SDH  # Frequent Falls  Soto Gibbs was evaluated by Neurosurgery and was managed non-operatively.  He had repeat imaging of his head injury which showed Acute right subdural falcine hematoma measuring up to 8 mm in thickness, similar to reported size on prior [facility] head CT.     He was monitored with serial neurological examinations which remained stable.  Neurosurgery recommends continuing Keppra for seizure prophylaxis for a total of 7 days. Hold aspirin until follow-up. Follow-up with Neurosurgery in 2 weeks with repeat head CT     He was evaluated by occupational therapies during his hospitalization.  Patient declined PT evaluation. TCU was recommended d/t patient's frequent falls and deconditioning. Patient declined TCU placement multiple times. Attempted to set up Home Health Care and had RNCC meet with patient for a safer discharge home. Patient also declined these services. Patient understands that he may fall again and may have another traumatic injury.  Patient is of right mind and able to make his own decisions.      # Acute on chronic radiculopathy pain  Originally continued on PTA Gabapentin 300mg three times a day for right lower extremity radiculopathy pain and Percocet. Given the recent report of multiple falls, weakness, ELISABETH on CKD, and poor appetite possible reduced clearance with reduced kidney function may be contributing to weakness and falls. This was discussed with the patient.   Pain management plan:  - Discontinued IV dilaudid, appears pain is chronic. Will re-evaluate as needed.  - Continue scheduled tylenol  - Continue PRN Percocet  - Willing to try Gabapentin at bedtime, hold daytime doses for now.  - Poor candidate for NSAIDs due to poor renal function  - Add Lidoderm and Menthol patches to the low back and right hip for pain  Patient had no pain the morning of discharge.      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  - Aggressive pulmonary hygiene. Incentive spirometer while awake   Patient to continue on home supplemental oxygen      Cardiovascular:    # HTN   # HLD  PTA meds: Lisinopril, Atenolol, Triamterene/HCTZ, Aspirin  Hold Triamterene/HCTZ for now, resolving ELISABETH  Continue Atenolol, ACEi,  Aspirin on hold due to intracranial hemorrhage until follow up     GI/Nutrition:    # Severe malnutrition, muscle/fat depletion.  Pt reports that his appetite has been very poor over the last 2-3months. He states he eats a cookie for breakfast, bread for lunch and a salad and chicken occasionally for dinner. He states he has access to food and his room  "mate makes the meals. He is not sure why his appetite has been poor. He denies significant weight loss, \"maybe 1pound.\" Denies nausea, abdominal pain  Patient was seen by Nutrition: per Note   Pt reports having a poor PO intake for the past several months; very limited interest in food, likely contributing to increased weakness/falls per hx. He reports that his usual intake prior to having a reduced appetite consisted of ~1 meal per day with frequent snacks/grazing throughout the day on foods like pie, cookies, sweets. His 1 meal will typically be ~5 PM and will consist of eggs and grits with butter and sugar. He has a room mate who will prepare foods for him and encourages him to eat; however, he frequently declines. Does not take any protein shake supplements, but has tried Boost in the past (thinks it is \"ok\" but will not go \"out of his way\" to obtain them once he's back at home). Reports having very little motivation to get out of his chair to prepare foods, even if it is just heating something in a microwave. Also admits that he likely forgets to eat.      Acknowledges that his lack of PO intake is likely contributing to his increased weakness and falls. Was not open to the suggestion of setting an alarm on his phone as a reminder to eat, and was not open to suggestions for increasing PO intake at home; however, was willing for RD to set up snacks/supplements between meals while in hospital.        Renal/ Fluids/Electrolytes:  # Acute on chronic kidney injury  # Mild hypophosphatemia  # Right renal mass, S/P biopsy  08/25  Baseline creatinine 1.5-1.77, admitted with a creatinine of 2.07, improved to 1.54 after IVF.   - UOP is adequate  - Hematuria: possible trauma with straight cath attempt. Patient seen by Urology during admission. Per recommendations:  - patient continues to void with low PVRs and urine character not concerning currently  - No changes to recommendations mentioned in the prior consult note  - " Follow-up to be scheduled with Dr. Erickson at [facility] to discuss renal biopsy results      Endocrine:   # Diabetes Mellitus II  - PTA medications: none   Monitor glucose levels x 48hrs, will initiate a sliding scale if >180/dL       Lines/ tubes/ drains:  - PIV    Therapy Recommendations:   Current status of physical therapies on discharge: D/t pt's BP being elevated abovesystolic threshold of 140, unable to complete objective portion of eval. Once BP is controlled, will assess OOB mobility.  - PT declined 2nd evaluation    Current status of occupational therapies on discharge: Transitional Care Facility;home with assist;home with home care occupational therapy.Pt is currently below baseline function. Pt would benefit from continued therapy in TCU setting to maximize functional independence, strength, endurance and safety. Pt has had multiple falls recently. Min A, vc' and FWW.  Code Status   DNR / Pre arrest intubation ok    SUBJECTIVE: Soto Gibbs is a 82 year old male with a PMH significant for COPD on 2L NC at home, FORTUNATO, right renal mass S/P biopsy 08/28,CKD 3,weakness with multiple recent falls at home that was admitted on 9/6/22. He presented as a transfer from [facility] with a right falx 8mm SDH. He takes an 81mg of ASA daily, no other anticoagulation. Per chart review, multiple falls over the last few days most recently he fell and struck his head on a table. Patient had a follow-up Head CT that was stable. Patient did not have pain, or signs of concussion. Pt did not want to go to a TCU or have HHC come to his house. RD saw him and attempted to provide recommendations to help him meet his nutritional needs. Patient declined recommendations. Patient medically stable and able to discharge home. He set up a ride with a friend who will bring his oxygen. His daughter was on the phone and aware of the plan.     Physical Exam   Temp: 98.7  F (37.1  C) Temp src: Oral BP: 127/69 Pulse: 81   Resp: 20  "SpO2: 97 % O2 Device: Nasal cannula Oxygen Delivery: 2 LPM  Vitals:    09/06/22 1800 09/07/22 0427 09/08/22 0700   Weight: 118.7 kg (261 lb 11 oz) 118.7 kg (261 lb 11 oz) 112.4 kg (247 lb 12.8 oz)     Vital Signs with Ranges  Temp:  [97.9  F (36.6  C)-98.7  F (37.1  C)] 98.7  F (37.1  C)  Pulse:  [76-86] 81  Resp:  [20-22] 20  BP: (127-191)/(65-90) 127/69  FiO2 (%):  [21 %-30 %] 30 %  SpO2:  [88 %-100 %] 97 %  I/O last 3 completed shifts:  In: 600 [P.O.:600]  Out: 1500 [Urine:1500]      Constitutional: Awake, alert, cooperative, no apparent distress.  Eyes: Lids and lashes normal, PERRL, EOMI, sclera clear, conjunctiva normal.  HENT: Normocephalic, atraumatic, NC in place  Respiratory: No increased work of breathing, good air exchange, clear to auscultation bilaterally,   Cardiovascular:  regular rate and rhythm, normal S1 and S2,    GI: Abdomen soft, non-distended, non-tender,   Genitourinary:  Voids independently   Skin: Warm & dry  Musculoskeletal: There is no redness, warmth, or swelling of the joints.    Neurologic: Awake, alert, oriented. Strength and sensory is intact. No focal deficits.  Neuropsychiatric: Calm, normal eye contact, alert, affect appropriate to situation, oriented, thought process normal.    Discharge Disposition   Discharged to home  Condition at discharge: Stable  Discharge VS: Blood pressure 127/69, pulse 81, temperature 98.7  F (37.1  C), temperature source Oral, resp. rate 20, height 1.905 m (6' 3\"), weight 112.4 kg (247 lb 12.8 oz), SpO2 97 %.    Consultations This Hospital Stay   OCCUPATIONAL THERAPY ADULT IP CONSULT  PHYSICAL THERAPY ADULT IP CONSULT  PALLIATIVE CARE ADULT IP CONSULT  SOCIAL WORK IP CONSULT  NUTRITION SERVICES ADULT IP CONSULT  UROLOGY IP CONSULT    Discharge Orders      CT Head w/o contrast*     Discharge Medications   Current Discharge Medication List      CONTINUE these medications which have NOT CHANGED    Details   albuterol (PROVENTIL HFA;VENTOLIN HFA) 90 " mcg/actuation inhaler [ALBUTEROL (PROVENTIL HFA;VENTOLIN HFA) 90 MCG/ACTUATION INHALER] Inhale 2 puffs every 6 (six) hours as needed for wheezing or shortness of breath.  Qty: 8.5 g, Refills: 0    Associated Diagnoses: COPD exacerbation (H)      albuterol (PROVENTIL) 2.5 mg /3 mL (0.083 %) nebulizer solution [ALBUTEROL (PROVENTIL) 2.5 MG /3 ML (0.083 %) NEBULIZER SOLUTION] Take 3 mL (2.5 mg total) by nebulization every 4 (four) hours as needed for shortness of breath.  Qty: 75 mL, Refills: 0    Associated Diagnoses: Chronic obstructive pulmonary disease, unspecified COPD type (H); Acute respiratory failure with hypercapnia (H)      aspirin 81 MG EC tablet [ASPIRIN 81 MG EC TABLET] Take 81 mg by mouth every evening.       atenolol (TENORMIN) 25 MG tablet [ATENOLOL (TENORMIN) 25 MG TABLET] Take 1 tablet (25 mg total) by mouth every morning.  Qty: 30 tablet, Refills: 0    Associated Diagnoses: Essential hypertension      gabapentin (NEURONTIN) 300 MG capsule Take 300 mg by mouth 2 times daily      hydrochlorothiazide (HYDRODIURIL) 25 MG tablet Take 25 mg by mouth daily      lisinopril (PRINIVIL,ZESTRIL) 20 MG tablet [LISINOPRIL (PRINIVIL,ZESTRIL) 20 MG TABLET] Take 20 mg by mouth every morning.      oxyCODONE-acetaminophen (PERCOCET) 5-325 mg per tablet [OXYCODONE-ACETAMINOPHEN (PERCOCET) 5-325 MG PER TABLET] Take 1 tablet by mouth every 4 (four) hours as needed for pain.      simvastatin (ZOCOR) 20 MG tablet Take 20 mg by mouth every evening      tiotropium-olodaterol 2.5-2.5 MCG/ACT AERS Inhale 2 puffs into the lungs daily      OXYGEN-AIR DELIVERY SYSTEMS MISC [OXYGEN-AIR DELIVERY SYSTEMS MISC] Inhale 2 L/min continuous. Indications: copd, sleep apnea         STOP taking these medications       tiotropium (SPIRIVA) 18 mcg inhalation capsule Comments:   Reason for Stopping:             Allergies   Allergies   Allergen Reactions     Morphine (Pf) [Morphine] Other (See Comments) and Dizziness     Hallucinations     Data    Most Recent 3 CBC's:  Recent Labs   Lab Test 09/08/22  0507 09/07/22  1824 09/07/22  0449 09/06/22  0401   WBC 7.3  --  8.1 8.2   HGB 11.2* 11.4* 11.0* 10.8*   MCV 93  --  94 94     --  277 280      Most Recent 3 BMP's:  Recent Labs   Lab Test 09/08/22  1110 09/08/22  0713 09/08/22  0507 09/07/22  0800 09/07/22  0449 09/06/22  1606 09/06/22  1444   NA  --   --  140  --  141  --  143   POTASSIUM  --   --  3.8  --  3.9  --  4.3   CHLORIDE  --   --  98  --  99  --  97*   CO2  --   --  31*  --  34*  --  35*   BUN  --   --  29.0*  --  35.9*  --  36.3*   CR  --   --  1.54*  --  1.65*  --  1.71*   ANIONGAP  --   --  11  --  8  --  11   LEENA  --   --  9.1  --  9.0  --  9.4   * 130* 144*   < > 144*   < > 144*    < > = values in this interval not displayed.     Most Recent 2 LFT's:  Recent Labs   Lab Test 09/06/22  0401 05/20/22  1319   AST 12 17   ALT <9 14   ALKPHOS 67 66   BILITOTAL 0.4 0.3     Most Recent INR's and Anticoagulation Dosing History:  Anticoagulation Dose History     Recent Dosing and Labs Latest Ref Rng & Units 9/6/2022    INR 0.85 - 1.15 1.10        Most Recent 3 Troponin's:No lab results found.  Most Recent 6 Bacteria Isolates From Any Culture (See EPIC Reports for Culture Details):No lab results found.  Most Recent TSH, T4 and A1c Labs:  Recent Labs   Lab Test 05/15/20  1427   A1C 7.2*     Results for orders placed or performed during the hospital encounter of 09/06/22   CT Head w/o Contrast    Narrative    EXAM: CT HEAD W/O CONTRAST  9/6/2022 11:17 AM     HISTORY:  follow up right falx SDH       COMPARISON: No comparison imaging available at the time of this  dictation. However, the patient reportedly had a head CT at [facility]  with a right falcine subdural hematoma measuring up to 8 mm in  thickness.    TECHNIQUE: Using multidetector thin collimation helical acquisition  technique, axial, coronal and sagittal CT images from the skull base  to the vertex were obtained without intravenous  contrast.   (topogram) image(s) also obtained and reviewed.    FINDINGS:  Mildly motion degraded examination. Small acute subdural hematoma  along the falx on the right measuring up to 8 mm in greatest  thickness. Incidental anterior falcine ossification. No midline shift.  No acute loss of gray-white matter differentiation in the cerebral  hemispheres. Ventricles are proportionate to the cerebral sulci. Clear  basal cisterns.    The bony calvaria and the bones of the skull base are normal. The  visualized portions of the paranasal sinuses and mastoid air cells are  clear. Bilateral pseudophakia.       Impression    IMPRESSION: Acute right subdural falcine hematoma measuring up to 8 mm  in thickness, similar to reported size on prior [facility] head CT.     DUANE CARDONA MD         SYSTEM ID:  [id number]   XR Chest Port 1 View    Narrative    Portable chest    INDICATION: Shortness of breath    COMPARISON: Outside radiograph 2/28/2017    FINDINGS: Heart size appears normal. Calcification at the aortic knob.  Patchy densities in the perihilar regions of the lungs may indicate  mild pulmonary vascular prominence.      Impression    IMPRESSION: Possible central atelectasis or minimal edema.  Atherosclerosis.    DWIGHT OLIVIA MD         SYSTEM ID:  [id number]       Time Spent on this Encounter   I, Remedios Gaxiola PA-C, personally saw the patient today and spent greater than 30 minutes discharging this patient.    We appreciate the opportunity to care for your patient while in the hospital.  Should you have any questions about their injuries or this discharge summary our contact information is below.    Trauma Service   [facility]   [address] [place] [address]  [phone]

## 2022-09-08 NOTE — PROGRESS NOTES
"CLINICAL NUTRITION SERVICES - ASSESSMENT NOTE     Nutrition Prescription    Malnutrition Status:     Severe malnutrition in the context of chronic illness     Recommendations already ordered by Registered Dietitian (RD):  - Send Ensure Enlive, once daily + additional PRN if requested [350 Kcals, 20 gm protein per carton]  - Send vanilla ice cream and a Special K protein bar in afternoon  - Declined room service assistance    Future/Additional Recommendations:  Monitor PO intake/adequacy, weight trends, POC (possible discharge today now per primary team)     REASON FOR ASSESSMENT  Soto Gibbs is a/an 82 year old male assessed by the dietitian for RN Consult - \"Eats less than 25% meals PTA, por appetite, options for supplements\"    CLINICAL HISTORY  Hx of COPD, FORTUNATO, CKD III, R renal mass, and multiple recent falls at home, admitted to [facility] from [facility] s/p fall resulting in SDH.      NUTRITION HISTORY  Pt reports having a poor PO intake for the past several months; very limited interest in food, likely contributing to increased weakness/falls per hx. He reports that his usual intake prior to having a reduced appetite consisted of ~1 meal per day with frequent snacks/grazing throughout the day on foods like pie, cookies, sweets. His 1 meal will typically be ~5 PM and will consist of eggs and grits with butter and sugar. He has a room mate who will prepare foods for him and encourages him to eat; however, he frequently declines. Does not take any protein shake supplements, but has tried Boost in the past (thinks it is \"ok\" but will not go \"out of his way\" to obtain them once he's back at home). Reports having very little motivation to get out of his chair to prepare foods, even if it is just heating something in a microwave. Also admits that he likely forgets to eat.     Acknowledges that his lack of PO intake is likely contributing to his increased weakness and falls. Was not open to the suggestion of " "setting an alarm on his phone as a reminder to eat, and was not open to suggestions for increasing PO intake at home; however, was willing for RD to set up snacks/supplements between meals while in hospital.     CURRENT NUTRITION ORDERS  Diet: Regular  Intake/Tolerance: 75% intake documented for 1 meal per I/O; otherwise, limited information available. Declined RD offer to set up room service assistance.     GI    1-4 unmeasured BMs per day, monitor      LABS    BUN/Cr elevated but trending down     Hypophosphatemia 9/7, resolved but rec continue to monitor labs as available     BG trends acceptable for acute care setting (acceptable BG range for hospital setting is 140-180 mg/dL per ADA for most patients)   o No recent A1C but note 5/15/2020 A1C of 7.6% (high), consider re-checking    MEDICATIONS    Reviewed     SKIN    No deficits noted per RN note     ANTHROPOMETRICS  Height: 190.5 cm (6' 3\")  Admit wt 122.5 kg (270 lbs) 9/6/22 --Likely reported wt  Most Recent Weight: 112.4 kg (247 lb 12.8 oz) -standing scale   IBW: 89.1 kg  126%IBW   BMI: Obesity Grade I BMI 30-34.9    Weight History:   - Weight down 13.5 kg (10.7%) over the past 4-months; severe loss.   Wt Readings from Last 15 Encounters:   09/08/22 112.4 kg (247 lb 12.8 oz)   05/10/22 125.9 kg (277 lb 8 oz)   06/07/17 121.6 kg (268 lb)   04/19/17 122.2 kg (269 lb 8 oz)   03/04/17 126.1 kg (278 lb)   01/24/17 123.1 kg (271 lb 6.4 oz)       Dosing Weight: 95 kg (adjusted per lowest wt 112.4 kg on 9/8 and IBW 89.1 kg)     ASSESSED NUTRITION NEEDS  Estimated Energy Needs: 2404-3191 kcals/day (25 - 30 kcals/kg)  Justification: Maintenance  Estimated Protein Needs: ~115-145 grams protein/day (~1.2 - 1.5 grams of pro/kg)  Justification: Increased needs s/p trauma, lean body mass preservation   Estimated Fluid Needs: 4542-4169 mL/day (1 mL/kcal)   Justification: Maintenance    PHYSICAL FINDINGS  See malnutrition section below.  Dry skin     MALNUTRITION  % Intake: " </=75% for >/= 1 month (severe)  % Weight Loss: > 10% in 6 months (severe)  Subcutaneous Fat Loss: Upper arm:  Moderate to severe and Thoracic/intercostal:  Mild to moderate  Muscle Loss: Upper arm (bicep, tricep):  Moderate and Posterior calf:  Severe  Fluid Accumulation/Edema: Does not meet criteria  Malnutrition Diagnosis: Severe malnutrition in the context of chronic illness     NUTRITION DIAGNOSIS  Inadequate protein-energy intake related to poor appetite with inadequate PO intake as evidenced by pt diet hx, moderate to severe muscle/fat depletion per NFPE, frequent falls PTA.       INTERVENTIONS  Implementation  Nutrition education for nutrition relationship to health/disease   Collaboration with other providers  Medical food supplement therapy  Modify composition of meals/snacks     Goals  Patient to consume % of nutritionally adequate meal trays TID, or the equivalent with supplements/snacks.     Monitoring/Evaluation  Progress toward goals will be monitored and evaluated per protocol.  John Mirza RDN, LD, CNSC  6B RD pager: [phone] 1[address] work-room RD phone: *[phone]    Weekend/Holiday RD pager [phone]

## 2022-09-08 NOTE — PROGRESS NOTES
09/08/22 1030   Quick Adds   Type of Visit Initial Occupational Therapy Evaluation   Living Environment   People in Home friend(s)   Current Living Arrangements house   Home Accessibility stairs within home   Number of Stairs, Within Home, Primary eight   Stair Railings, Within Home, Primary railing on right side (ascending)   Transportation Anticipated family or friend will provide   Living Environment Comments Pt reports he has a walk in shower with a shower chair available.   Self-Care   Usual Activity Tolerance moderate   Current Activity Tolerance fair   Regular Exercise No   Equipment Currently Used at Home shower chair;walker, rolling   Fall history within last six months yes   Number of times patient has fallen within last six months 6   Activity/Exercise/Self-Care Comment Pt reports that most of his falls have occured in his bathroom and so he makes sure to use his FWW in the bathroom.   General Information   Onset of Illness/Injury or Date of Surgery 09/06/22   Referring Physician Estefania Hernández APRN CNP   Patient/Family Therapy Goal Statement (OT) Pt would like to return home.   Additional Occupational Profile Info/Pertinent History of Current Problem Soto Gibbs is a 82 year old male with PMHx of HTN, CKD (stage 3), COPD, DM2, and frequent falls.  He presented to [facility] ED following a fall last night.  He reports hitting his head but denies loss of consciousness. At [facility] Head CT revealed a right falx acute subdural hematoma thickest anteriorly measuring 8 mm with no significant midline shift.  He was transferred to [facility] for further evaluation.   Existing Precautions/Restrictions fall   Left Upper Extremity (Weight-bearing Status) full weight-bearing (FWB)   Right Upper Extremity (Weight-bearing Status) full weight-bearing (FWB)   Left Lower Extremity (Weight-bearing Status) full weight-bearing (FWB)   Right Lower Extremity (Weight-bearing Status) full weight-bearing (FWB)   Cognitive  Status Examination   Orientation Status orientation to person, place and time   Affect/Mental Status (Cognitive) agitated   Follows Commands verbal cues/prompting required   Safety Deficit judgment;insight into deficits/self-awareness;impulsivity   Visual Perception   Visual Impairment/Limitations corrective lenses full-time   Sensory   Sensory Quick Adds No deficits were identified   Pain Assessment   Patient Currently in Pain No   Integumentary/Edema   Integumentary/Edema no deficits were identifed   Range of Motion Comprehensive   Comment, General Range of Motion BUE AROM 0-~150 degrees humeral flexion.   Strength Comprehensive (MMT)   Comment, General Manual Muscle Testing (MMT) Assessment BUE MMT grossly 4/5 MMT.   Bed Mobility   Comment (Bed Mobility) Min A and vc's.   Transfers   Transfer Comments Min A and vc's with FWW.   Activities of Daily Living   BADL Assessment/Intervention bathing;lower body dressing;grooming;toileting   Bathing Assessment/Intervention   [place] Level (Bathing) set up;verbal cues;moderate assist (50% patient effort)   Lower Body Dressing Assessment/Training   [place] Level (Lower Body Dressing) set up;verbal cues;moderate assist (50% patient effort)   Grooming Assessment/Training   [place] Level (Grooming) set up;verbal cues;minimum assist (75% patient effort)   Toileting   [place] Level (Toileting) set up;verbal cues;minimum assist (75% patient effort)   Clinical Impression   Criteria for Skilled Therapeutic Interventions Met (OT) Yes, treatment indicated   OT Diagnosis Decreased independence and safety with functional transfers and ADLS/IADLS.   OT Problem List-Impairments impacting ADL problems related to;activity tolerance impaired;balance;cognition;flexibility;mobility;range of motion (ROM);strength   Assessment of Occupational Performance 3-5 Performance Deficits   Identified Performance Deficits Decreased independence with functional transfers and  ADLS/IADLS.   Planned Therapy Interventions (OT) ADL retraining;IADL retraining;bed mobility training;cognition;ROM;strengthening;stretching;transfer training;home program guidelines;progressive activity/exercise   Clinical Decision Making Complexity (OT) low complexity   Risk & Benefits of therapy have been explained evaluation/treatment results reviewed;care plan/treatment goals reviewed;patient   OT Discharge Planning   OT Discharge Recommendation (DC Rec) Transitional Care Facility;home with assist;home with home care occupational therapy  (Home with A And Home safety evaluation.)   OT Rationale for DC Rec Pt is currently below baseline function. Pt would benefit from continued therapy in TCU setting to maximize functional independence, strength, endurance and safety. Pt has had multiple falls recently.   OT Brief overview of current status Min A, vc' and FWW.   Total Evaluation Time (Minutes)   Total Evaluation Time (Minutes) 10   OT Goals   Therapy Frequency (OT) 5 times/wk   OT Predicted Duration/Target Date for Goal Attainment 09/15/22   OT Goals Hygiene/Grooming;Lower Body Dressing;Transfers;Toilet Transfer/Toileting;Cognition;OT Goal 1   OT: Hygiene/Grooming modified independent;within precautions   OT: Lower Body Dressing Independent   OT: Transfer Independent  (shower transfer)   OT: Toilet Transfer/Toileting Independent;toilet transfer;cleaning and garment management   OT: Cognitive Patient/caregiver will verbalize understanding of cognitive assessment results/recommendations as needed for safe discharge planning   OT: Goal 1 Pt will verbalize and demonstrate independence with BUE HEP in prep for ADLS/transfers.

## 2022-09-08 NOTE — PLAN OF CARE
Goal Outcome Evaluation:    Plan of Care Reviewed With: patient     Overall Patient Progress: no change    Outcome Evaluation: Inadequate oral intake, severe malnutrition, muscle/fat depletion. Willing to start supplements/snacks to optimize intake. Limited acceptance to RD recommendations for strategies to optimize PO intake once goes back home. Encourage stable meals, snacks, supplements. Discourage missed meals. See full RD note.    John Mirza RDN, LD, CNSC  6B RD pager: [phone] Phone: [phone]

## 2022-09-08 NOTE — CONSULTS
Care Management Initial Consult    General Information  Assessment completed with: Patient  Type of CM/SW Visit: Initial Assessment  Primary Care Provider verified and updated as needed: Yes   Readmission within the last 30 days: current reason for admission unrelated to previous admission   Advance Care Planning: No ACP documents on file, declines at this time.     Communication Assessment  Patient's communication style: spoken language (English or Bilingual)    Hearing Difficulty or Deaf: no   Wear Glasses or Blind: no    Cognitive  Cognitive/Neuro/Behavioral: WDL    Living Environment:   People in home: friend(s)     Current living Arrangements: house      Able to return to prior arrangements: yes     Family/Social Support:  Care provided by:    Provides care for: no one  Marital Status: Single  Support System: Daughter  Description of Support System: Supportive, Involved       Current Resources:   Patient receiving home care services: No  Community Resources: None  Equipment currently used at home: walker, rolling  Supplies currently used at home: Oxygen Tubing/Supplies    Employment/Financial:  Employment Status: retired        Functional Status:  Prior to admission patient needed assistance: Independent with all ADLs.      Mental Health Status:  Mental Health Status: No Current Concerns       Chemical Dependency Status:  Chemical Dependency Status: No Current Concerns          Additional Information:  Care management assessment completed at the bedside w/patient and his daughter via speaker phone. Patient confirms that he lives locally with friends. Patient does not have any current in home services. Patient utilizes a walker w/ambulation. Patient has home oxygen and CPAP through [facility] (2L during the day, 4L w/CPAP at night). Patient declines a TCU stay and any in home or OP PT/OT at this time. Patient notes that he has someone who will be able to provide transportation at discharge and they will bring a  portable oxygen tank. Patient's daughter wanted to ask more questions regarding discharge planning and medical readiness for discharge, patient noted to daughter that he was working on going home and they would talk later. Writer offered to contact patient's daughter to discuss discharge planning, patient declined. No additional discharge needs noted.     1500 Addendum:  Discharge confirmed, IMM completed w/patient. Patient confirmed transportation for 4p today.     Remedios Vargas, RNCC, BSN    [facility]    [address]  [address] [place] [address]    [email or web address]    Office: [phone] Pager: [phone]    To contact the weekend RNCC  [place] (0800 [phone] 1630) Saturday and Sunday    Units: 4A, 4C, 4E, 5A and 5B- Pager 1: [phone]    Units: 6A, 6B, 6C, 6D- Pager 2: [phone]    Units: 7A, 7B, 7C, 7D, and 5C-Pager 3: [phone]

## 2022-09-08 NOTE — PLAN OF CARE
Neuro: A&Ox4. Neuros intact.  Follows commands. Can be forgetful at times.  Cardiac: SR HR 70 - 80s. SBP variable 140's- 160's. Afebrile IV labetalol given x1 .    Respiratory: Sating >92% on 2L NC and Bipap when sleeping.  GI/: Adequate urine output although pink/red clots. BM X2 loose   Diet/appetite: Regular diet. Needs encouragements   Activity:  Assist of 1-2, walked up and to the bathroom overnight.  Pain: Complains of generalized pain throughout although mainly in lower back. Percocet given x1, Robaxin x1 and Tylenol x1. All given relief.   Skin: No new deficits noted.  LDA's: PIV x1 SL     Plan: Continue with POC. Notify primary team with changes.

## 2022-09-09 NOTE — PROGRESS NOTES
Physical Therapy Discharge Summary    Reason for therapy discharge:    Discharged to home declining therapies.    Progress towards therapy goal(s). See goals on Care Plan in [facility] electronic health record for goal details.  Goals not met.  Barriers to achieving goals:   discharge from facility.    Therapy recommendation(s):    Pt never fully evaluated or assessed d/t BP and declining OOB mobility with therapists.

## 2022-09-10 NOTE — PROGRESS NOTES
"RN Care Coordinator received message from W/CTA requesting call to patient as he requested to speak with a nurse regarding recent head injury.      Call made to pt.    Pt reports he's still \"bleeding from the penis, a drop or two of blood or sometimes clots from clogs.\"  He states he is wearing depends now and has seen clots in the depends.  Reports urinary frequency, urgency but not voiding much at all \"less than a tablespoon,\" and incontinence.  He states while in the hospital they attempted to straight cath him, and thought that was possibly the cause of hematuria.  He had a renal biopsy on 8/25/22 but was \"told it had nothing to do with that.\"       He states he \"can walk now, I couldn't walk before I went to the hospital.\"      He reports he has been drinking more fluids, his daughter bought him some Gatorade yesterday, and he drank 2 of the 6 bottles last night, but hasn't had any today.  He's had about 8 oz of water so far today, but no other fluids yet.  He reports he's been eating more since discharge as well.        RN advised pt contact his PCP, Urologist or Oncologist about his continued urinary symptoms and hematuria.  Encouraged pushing fluids to help stay hydrated as well as dilute urine to decrease bladder irritation.  Encouraged pt continue eating small frequent meals throughout the day.  Pt verbalized understanding and agrees with plan, he will contact Dr. Erickson's office regarding urinary symptoms now.      Remedios Mcallister RN  [facility] - Ambulatory Care Management       "

## 2022-09-10 NOTE — PROGRESS NOTES
Clinic Care Coordination Contact  [facility]: Post-Discharge Note  SITUATION                                                      Admission:    Admission Date: 09/06/22   Reason for Admission: . Falls frequently   2. Muscle weakness (generalized)   3. Subdural hematoma (H)  Discharge:   Discharge Date: 09/08/22  Discharge Diagnosis: . Falls frequently   2. Muscle weakness (generalized)   3. Subdural hematoma (H)    BACKGROUND                                                      Per hospital discharge summary and inpatient provider notes:    The patient sustained the above injury as a result of fall.  The mechanism of injury and factors contributing to the accident were discussed with the patient.  Strategies on how to prevent future accidents were reviewed.  The patient underwent tertiary examination to evaluate for additional injuries    ASSESSMENT         Discharge Assessment  How are you doing now that you are home?: Pt reported having a bleeding  How are your symptoms? (Red Flag symptoms escalate to triage hotline per guidelines): Unchanged  Do you feel your condition is stable enough to be safe at home until your provider visit?: No (see comment)  Does the patient have their discharge instructions? : Yes  Does the patient have questions regarding their discharge instructions? : No  Were you started on any new medications or were there changes to any of your previous medications? : Yes  Does the patient have all of their medications?: Yes  Do you have questions regarding any of your medications? : No  Do you have all of your needed medical supplies or equipment (DME)?  (i.e. oxygen tank, CPAP, cane, etc.): Yes  Discharge follow-up appointment scheduled within 14 calendar days? : Yes  Discharge Follow Up Appointment Date: 09/21/22  Discharge Follow Up Appointment Scheduled with?: Specialty Care Provider    Post-op ([facility] CTA Only)  If the patient had a surgery or procedure, do they have any questions for a  nurse?: No       PLAN                                                      Outpatient Plan:      Follow-up to be scheduled with Dr. Erickson at [facility] to discuss renal biopsy results      Future Appointments   Date Time Provider Department Center   9/21/2022  7:40 AM [id number]   9/21/2022  9:00 AM Elysia Segura APRN CNP [facility]   9/27/2022  3:45 PM Jose Roberto Erickson MD [facility]       For any urgent concerns, please contact our 24 hour nurse triage line: [phone] ([phone])       MASSIMO Dillon  [phone]  [facility]

## 2022-10-14 NOTE — LETTER
10/14/2022       RE: Soto Gibbs  [address] [facility] [address]     Dear Colleague,    Thank you for referring your patient, Soto Gibbs, to the [facility]. Please see a copy of my visit note below.    [facility]  Department of Neurosurgery      Name: Soot Gibbs  MRN: [id number]  Age: 82 year old  : 1940  Referring provider: Dimitris Calderon  10/14/2022      Chief Complaint:   Subdural hematoma, secondary to a fall on 2022  Post discharge follow up    History of Present Illness:   Soto Gibbs is a 82 year old male with a history of ELISABETH, CKD,COPD, FORTUNATO, HTN  who is here today for a post discharge follow up. On , patient had a fall while trying to get up and he was seen at [facility].  His head CT showed a right falx hematoma and he was transferred to [facility] for further care.  His repeat imaging showed a stable right subdural falcine hematoma.  He was evaluated by neurosurgery and was managed nonoperatively.  Prior to the hospital admission, he was on aspirin which was held.  Patient was discharged from the hospital on .    Patient is here for post hospital discharge follow-up.  Overall he has been doing well.  He denies any headache, vision or speech changes, weakness, seizures since hospital discharge.  He lives independently.  No additional falls since hospital discharge.  Reports that he uses a cane or a walker inside his house for ambulation.  He remains off of aspirin.     Patient had a follow-up head CT today prior to this appointment.  Please see the results below.      Review of Systems:   Pertinent items are noted in HPI or as in patient entered ROS below, remainder of complete ROS is negative.   No flowsheet data found.     Active Medications:     Current Outpatient Medications:      albuterol (PROVENTIL  HFA;VENTOLIN HFA) 90 mcg/actuation inhaler, [ALBUTEROL (PROVENTIL HFA;VENTOLIN HFA) 90 MCG/ACTUATION INHALER] Inhale 2 puffs every 6 (six) hours as needed for wheezing or shortness of breath., Disp: 8.5 g, Rfl: 0     albuterol (PROVENTIL) 2.5 mg /3 mL (0.083 %) nebulizer solution, [ALBUTEROL (PROVENTIL) 2.5 MG /3 ML (0.083 %) NEBULIZER SOLUTION] Take 3 mL (2.5 mg total) by nebulization every 4 (four) hours as needed for shortness of breath., Disp: 75 mL, Rfl: 0     atenolol (TENORMIN) 25 MG tablet, [ATENOLOL (TENORMIN) 25 MG TABLET] Take 1 tablet (25 mg total) by mouth every morning., Disp: 30 tablet, Rfl: 0     gabapentin (NEURONTIN) 300 MG capsule, Take 300 mg by mouth 2 times daily, Disp: , Rfl:      hydrochlorothiazide (HYDRODIURIL) 25 MG tablet, Take 25 mg by mouth daily, Disp: , Rfl:      levETIRAcetam (KEPPRA) 500 MG tablet, Take 1 tablet (500 mg) by mouth 2 times daily Take first dose tonight, Disp: 10 tablet, Rfl: 0     lisinopril (PRINIVIL,ZESTRIL) 20 MG tablet, [LISINOPRIL (PRINIVIL,ZESTRIL) 20 MG TABLET] Take 20 mg by mouth every morning., Disp: , Rfl:      oxyCODONE-acetaminophen (PERCOCET) 5-325 mg per tablet, [OXYCODONE-ACETAMINOPHEN (PERCOCET) 5-325 MG PER TABLET] Take 1 tablet by mouth every 4 (four) hours as needed for pain., Disp: , Rfl:      OXYGEN-AIR DELIVERY SYSTEMS MISC, [OXYGEN-AIR DELIVERY SYSTEMS MISC] Inhale 2 L/min continuous. Indications: copd, sleep apnea, Disp: , Rfl:      simvastatin (ZOCOR) 20 MG tablet, Take 20 mg by mouth every evening, Disp: , Rfl:      tiotropium-olodaterol 2.5-2.5 MCG/ACT AERS, Inhale 2 puffs into the lungs daily, Disp: , Rfl:       Allergies:   Morphine (pf) [morphine]      Past Medical History:  No past medical history on file.  Patient Active Problem List   Diagnosis     Diabetes mellitus, type 2 (H)     Chronic kidney disease, stage 3a (H)        Past Surgical History:  Past Surgical History:   Procedure Laterality Date     ABDOMEN SURGERY      Hernia      COLON SURGERY      Polyps.       Family History:   No family history on file.      Social History:   Social History     Tobacco Use     Smoking status: Former     Types: Cigarettes     Quit date: 1985     Years since quittin.6     Smokeless tobacco: Never   Substance Use Topics     Alcohol use: No     Drug use: No        Physical Exam:   /78   Pulse 73   Resp 16   SpO2 91%    General: No acute distress.  On portable oxygen at 2 L by nasal cannula.  Neuro: The patient is fully oriented. Speech is normal. Extraocular movements are intact without nystagmus. Facial sensation is intact in V1, V2, V3 distributions. Facial nerve function is normal, rated as a [place]ack[place] 1. Shoulders are full strength. There is no pronator drift. Full strength in all extremities. Sensation intact throughout. No dysmetria with finger-nose-finger testing. Gait is slightly wide-based but overall stable.  Psych: Normal mood and affect. Behavior is normal.      Imaging:  10/14/2022 CT head:  Impression:  Trace residual right parafalcine subdural hematoma, decreased in size  compared to prior exams. No acute hemorrhage.         Assessment:  Subdural hematoma, secondary to a fall on 2022  Post discharge follow up    Plan:  Overall patient has been doing well since hospital discharge.  His follow-up head CT from today shows trace residual right parafalcine subdural hematoma, decreased in size compared to the prior exam.  We will continue to hold aspirin and repeat his head CT in 3 weeks.  I will follow-up with him after the head CT.      Elysia Segura CNP  Department of Neurosurgery    I spent 20 minutes on patient care activities related to this encounter on the date of service, including time spent reviewing the chart, obtaining history and examination and in counseling the patient, and in documentation in the electronic medical record.

## 2022-10-14 NOTE — PROGRESS NOTES
[facility]  Department of Neurosurgery      Name: Soto Gibbs  MRN: [id number]  Age: 82 year old  : 1940  Referring provider: Dimitris Calderon  10/14/2022      Chief Complaint:   Subdural hematoma, secondary to a fall on 2022  Post discharge follow up    History of Present Illness:   Soto Gibbs is a 82 year old male with a history of ELISABETH, CKD,COPD, FORTUNATO, HTN  who is here today for a post discharge follow up. On , patient had a fall while trying to get up and he was seen at [facility].  His head CT showed a right falx hematoma and he was transferred to [facility] for further care.  His repeat imaging showed a stable right subdural falcine hematoma.  He was evaluated by neurosurgery and was managed nonoperatively.  Prior to the hospital admission, he was on aspirin which was held.  Patient was discharged from the hospital on .    Patient is here for post hospital discharge follow-up.  Overall he has been doing well.  He denies any headache, vision or speech changes, weakness, seizures since hospital discharge.  He lives independently.  No additional falls since hospital discharge.  Reports that he uses a cane or a walker inside his house for ambulation.  He remains off of aspirin.     Patient had a follow-up head CT today prior to this appointment.  Please see the results below.      Review of Systems:   Pertinent items are noted in HPI or as in patient entered ROS below, remainder of complete ROS is negative.   No flowsheet data found.     Active Medications:     Current Outpatient Medications:      albuterol (PROVENTIL HFA;VENTOLIN HFA) 90 mcg/actuation inhaler, [ALBUTEROL (PROVENTIL HFA;VENTOLIN HFA) 90 MCG/ACTUATION INHALER] Inhale 2 puffs every 6 (six) hours as needed for wheezing or shortness of breath., Disp: 8.5 g, Rfl: 0     albuterol (PROVENTIL) 2.5 mg /3 mL (0.083 %) nebulizer solution, [ALBUTEROL (PROVENTIL) 2.5 MG /3 ML (0.083 %)  NEBULIZER SOLUTION] Take 3 mL (2.5 mg total) by nebulization every 4 (four) hours as needed for shortness of breath., Disp: 75 mL, Rfl: 0     atenolol (TENORMIN) 25 MG tablet, [ATENOLOL (TENORMIN) 25 MG TABLET] Take 1 tablet (25 mg total) by mouth every morning., Disp: 30 tablet, Rfl: 0     gabapentin (NEURONTIN) 300 MG capsule, Take 300 mg by mouth 2 times daily, Disp: , Rfl:      hydrochlorothiazide (HYDRODIURIL) 25 MG tablet, Take 25 mg by mouth daily, Disp: , Rfl:      levETIRAcetam (KEPPRA) 500 MG tablet, Take 1 tablet (500 mg) by mouth 2 times daily Take first dose tonight, Disp: 10 tablet, Rfl: 0     lisinopril (PRINIVIL,ZESTRIL) 20 MG tablet, [LISINOPRIL (PRINIVIL,ZESTRIL) 20 MG TABLET] Take 20 mg by mouth every morning., Disp: , Rfl:      oxyCODONE-acetaminophen (PERCOCET) 5-325 mg per tablet, [OXYCODONE-ACETAMINOPHEN (PERCOCET) 5-325 MG PER TABLET] Take 1 tablet by mouth every 4 (four) hours as needed for pain., Disp: , Rfl:      OXYGEN-AIR DELIVERY SYSTEMS MISC, [OXYGEN-AIR DELIVERY SYSTEMS MISC] Inhale 2 L/min continuous. Indications: copd, sleep apnea, Disp: , Rfl:      simvastatin (ZOCOR) 20 MG tablet, Take 20 mg by mouth every evening, Disp: , Rfl:      tiotropium-olodaterol 2.5-2.5 MCG/ACT AERS, Inhale 2 puffs into the lungs daily, Disp: , Rfl:       Allergies:   Morphine (pf) [morphine]      Past Medical History:  No past medical history on file.  Patient Active Problem List   Diagnosis     Diabetes mellitus, type 2 (H)     Chronic kidney disease, stage 3a (H)        Past Surgical History:  Past Surgical History:   Procedure Laterality Date     ABDOMEN SURGERY      Hernia     COLON SURGERY      Polyps.       Family History:   No family history on file.      Social History:   Social History     Tobacco Use     Smoking status: Former     Types: Cigarettes     Quit date: 1985     Years since quittin.6     Smokeless tobacco: Never   Substance Use Topics     Alcohol use: No     Drug use: No         Physical Exam:   /78   Pulse 73   Resp 16   SpO2 91%    General: No acute distress.  On portable oxygen at 2 L by nasal cannula.  Neuro: The patient is fully oriented. Speech is normal. Extraocular movements are intact without nystagmus. Facial sensation is intact in V1, V2, V3 distributions. Facial nerve function is normal, rated as a House Brackmann 1. Shoulders are full strength. There is no pronator drift. Full strength in all extremities. Sensation intact throughout. No dysmetria with finger-nose-finger testing. Gait is slightly wide-based but overall stable.  Psych: Normal mood and affect. Behavior is normal.      Imaging:  10/14/2022 CT head:  Impression:  Trace residual right parafalcine subdural hematoma, decreased in size  compared to prior exams. No acute hemorrhage.         Assessment:  Subdural hematoma, secondary to a fall on 9/6/2022  Post discharge follow up    Plan:  Overall patient has been doing well since hospital discharge.  His follow-up head CT from today shows trace residual right parafalcine subdural hematoma, decreased in size compared to the prior exam.  We will continue to hold aspirin and repeat his head CT in 3 weeks.  I will follow-up with him after the head CT.      Elysia Segura CNP  Department of Neurosurgery    I spent 20 minutes on patient care activities related to this encounter on the date of service, including time spent reviewing the chart, obtaining history and examination and in counseling the patient, and in documentation in the electronic medical record.

## 2022-10-25 NOTE — PROGRESS NOTES
Soto called and requested to cancel his clinic apt today. He has already reviewed the biopsy results with the ordering provider, Dr. Christian. They are not recommending any further treatment due to his age. He does not want a second opinion at this time. He wll continue to follow with Dr. Myers, urologist. He has our contact information if any questions or concerns arise/Susan Mccloud RN

## 2023-01-01 ENCOUNTER — MEDICAL CORRESPONDENCE (OUTPATIENT)
Dept: HEALTH INFORMATION MANAGEMENT | Facility: CLINIC | Age: 83
End: 2023-01-01
Payer: MEDICARE

## 2023-01-01 ENCOUNTER — APPOINTMENT (OUTPATIENT)
Dept: CT IMAGING | Facility: CLINIC | Age: 83
DRG: 695 | End: 2023-01-01
Attending: STUDENT IN AN ORGANIZED HEALTH CARE EDUCATION/TRAINING PROGRAM
Payer: MEDICARE

## 2023-01-01 ENCOUNTER — TELEPHONE (OUTPATIENT)
Dept: ONCOLOGY | Facility: HOSPITAL | Age: 83
End: 2023-01-01
Payer: MEDICARE

## 2023-01-01 ENCOUNTER — TRANSCRIBE ORDERS (OUTPATIENT)
Dept: OTHER | Age: 83
End: 2023-01-01

## 2023-01-01 ENCOUNTER — APPOINTMENT (OUTPATIENT)
Dept: NUCLEAR MEDICINE | Facility: CLINIC | Age: 83
DRG: 695 | End: 2023-01-01
Attending: INTERNAL MEDICINE
Payer: MEDICARE

## 2023-01-01 ENCOUNTER — APPOINTMENT (OUTPATIENT)
Dept: PHYSICAL THERAPY | Facility: CLINIC | Age: 83
DRG: 695 | End: 2023-01-01
Payer: MEDICARE

## 2023-01-01 ENCOUNTER — APPOINTMENT (OUTPATIENT)
Dept: CARDIOLOGY | Facility: CLINIC | Age: 83
DRG: 695 | End: 2023-01-01
Attending: INTERNAL MEDICINE
Payer: MEDICARE

## 2023-01-01 ENCOUNTER — APPOINTMENT (OUTPATIENT)
Dept: RADIOLOGY | Facility: CLINIC | Age: 83
DRG: 695 | End: 2023-01-01
Attending: RADIOLOGY
Payer: MEDICARE

## 2023-01-01 ENCOUNTER — LAB (OUTPATIENT)
Dept: INFUSION THERAPY | Facility: HOSPITAL | Age: 83
End: 2023-01-01
Attending: INTERNAL MEDICINE
Payer: COMMERCIAL

## 2023-01-01 ENCOUNTER — PATIENT OUTREACH (OUTPATIENT)
Dept: ONCOLOGY | Facility: CLINIC | Age: 83
End: 2023-01-01
Payer: MEDICARE

## 2023-01-01 ENCOUNTER — TELEPHONE (OUTPATIENT)
Dept: CARDIOLOGY | Facility: CLINIC | Age: 83
End: 2023-01-01
Payer: MEDICARE

## 2023-01-01 ENCOUNTER — APPOINTMENT (OUTPATIENT)
Dept: RADIOLOGY | Facility: CLINIC | Age: 83
DRG: 695 | End: 2023-01-01
Attending: INTERNAL MEDICINE
Payer: MEDICARE

## 2023-01-01 ENCOUNTER — HEALTH MAINTENANCE LETTER (OUTPATIENT)
Age: 83
End: 2023-01-01

## 2023-01-01 ENCOUNTER — APPOINTMENT (OUTPATIENT)
Dept: CT IMAGING | Facility: CLINIC | Age: 83
DRG: 695 | End: 2023-01-01
Attending: INTERNAL MEDICINE
Payer: MEDICARE

## 2023-01-01 ENCOUNTER — HOSPITAL ENCOUNTER (INPATIENT)
Facility: CLINIC | Age: 83
LOS: 10 days | Discharge: HOSPICE | DRG: 695 | End: 2023-06-23
Attending: STUDENT IN AN ORGANIZED HEALTH CARE EDUCATION/TRAINING PROGRAM | Admitting: INTERNAL MEDICINE
Payer: MEDICARE

## 2023-01-01 ENCOUNTER — TELEPHONE (OUTPATIENT)
Dept: ONCOLOGY | Facility: CLINIC | Age: 83
End: 2023-01-01
Payer: MEDICARE

## 2023-01-01 ENCOUNTER — CARE COORDINATION (OUTPATIENT)
Dept: ONCOLOGY | Facility: CLINIC | Age: 83
End: 2023-01-01
Payer: MEDICARE

## 2023-01-01 ENCOUNTER — APPOINTMENT (OUTPATIENT)
Dept: ULTRASOUND IMAGING | Facility: CLINIC | Age: 83
DRG: 695 | End: 2023-01-01
Attending: INTERNAL MEDICINE
Payer: MEDICARE

## 2023-01-01 ENCOUNTER — APPOINTMENT (OUTPATIENT)
Dept: PHYSICAL THERAPY | Facility: CLINIC | Age: 83
DRG: 695 | End: 2023-01-01
Attending: INTERNAL MEDICINE
Payer: MEDICARE

## 2023-01-01 ENCOUNTER — PRE VISIT (OUTPATIENT)
Dept: OTHER | Age: 83
End: 2023-01-01

## 2023-01-01 ENCOUNTER — APPOINTMENT (OUTPATIENT)
Dept: CT IMAGING | Facility: CLINIC | Age: 83
DRG: 695 | End: 2023-01-01
Attending: RADIOLOGY
Payer: MEDICARE

## 2023-01-01 ENCOUNTER — APPOINTMENT (OUTPATIENT)
Dept: OCCUPATIONAL THERAPY | Facility: CLINIC | Age: 83
DRG: 695 | End: 2023-01-01
Attending: INTERNAL MEDICINE
Payer: MEDICARE

## 2023-01-01 ENCOUNTER — ONCOLOGY VISIT (OUTPATIENT)
Dept: ONCOLOGY | Facility: HOSPITAL | Age: 83
End: 2023-01-01
Attending: INTERNAL MEDICINE
Payer: COMMERCIAL

## 2023-01-01 ENCOUNTER — TRANSFERRED RECORDS (OUTPATIENT)
Dept: HEALTH INFORMATION MANAGEMENT | Facility: CLINIC | Age: 83
End: 2023-01-01

## 2023-01-01 ENCOUNTER — LAB REQUISITION (OUTPATIENT)
Dept: LAB | Facility: CLINIC | Age: 83
End: 2023-01-01
Payer: COMMERCIAL

## 2023-01-01 VITALS
WEIGHT: 239 LBS | HEART RATE: 62 BPM | SYSTOLIC BLOOD PRESSURE: 183 MMHG | DIASTOLIC BLOOD PRESSURE: 73 MMHG | RESPIRATION RATE: 18 BRPM | HEIGHT: 75 IN | OXYGEN SATURATION: 97 % | BODY MASS INDEX: 29.72 KG/M2

## 2023-01-01 VITALS
WEIGHT: 235.01 LBS | SYSTOLIC BLOOD PRESSURE: 131 MMHG | DIASTOLIC BLOOD PRESSURE: 62 MMHG | TEMPERATURE: 98.3 F | HEART RATE: 72 BPM | BODY MASS INDEX: 29.22 KG/M2 | RESPIRATION RATE: 20 BRPM | HEIGHT: 75 IN | OXYGEN SATURATION: 96 %

## 2023-01-01 DIAGNOSIS — R31.9 HEMATURIA: ICD-10-CM

## 2023-01-01 DIAGNOSIS — N18.30 STAGE 3 CHRONIC KIDNEY DISEASE, UNSPECIFIED WHETHER STAGE 3A OR 3B CKD (H): ICD-10-CM

## 2023-01-01 DIAGNOSIS — R11.0 NAUSEA: ICD-10-CM
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
## 2023-01-01 DIAGNOSIS — G47.33 OSA (OBSTRUCTIVE SLEEP APNEA): ICD-10-CM

## 2023-01-01 DIAGNOSIS — J43.9 PULMONARY EMPHYSEMA, UNSPECIFIED EMPHYSEMA TYPE (H): ICD-10-CM

## 2023-01-01 DIAGNOSIS — K21.9 GASTROESOPHAGEAL REFLUX DISEASE, UNSPECIFIED WHETHER ESOPHAGITIS PRESENT: ICD-10-CM

## 2023-01-01 DIAGNOSIS — M54.9 ACUTE BACK PAIN, UNSPECIFIED BACK LOCATION, UNSPECIFIED BACK PAIN LATERALITY: ICD-10-CM

## 2023-01-01 DIAGNOSIS — N28.89 RIGHT RENAL MASS: Primary | ICD-10-CM

## 2023-01-01 DIAGNOSIS — D64.9 SYMPTOMATIC ANEMIA: ICD-10-CM

## 2023-01-01 DIAGNOSIS — D49.519 KIDNEY TUMOR: Primary | ICD-10-CM

## 2023-01-01 DIAGNOSIS — R60.9 EDEMA, UNSPECIFIED: ICD-10-CM

## 2023-01-01 DIAGNOSIS — M62.81 GENERALIZED MUSCLE WEAKNESS: ICD-10-CM

## 2023-01-01 DIAGNOSIS — N17.9 AKI (ACUTE KIDNEY INJURY) (H): ICD-10-CM

## 2023-01-01 DIAGNOSIS — I50.9 ACUTE DECOMPENSATED HEART FAILURE (H): Primary | ICD-10-CM

## 2023-01-01 DIAGNOSIS — D64.9 SYMPTOMATIC ANEMIA: Primary | ICD-10-CM

## 2023-01-01 DIAGNOSIS — K59.03 DRUG-INDUCED CONSTIPATION: ICD-10-CM

## 2023-01-01 DIAGNOSIS — I10 HYPERTENSION, UNSPECIFIED TYPE: ICD-10-CM

## 2023-01-01 LAB
ABO/RH(D): NORMAL
ALBUMIN MFR UR ELPH: 111.4 MG/DL (ref 1–14)
ALBUMIN MFR UR ELPH: 38.5 %
ALBUMIN SERPL BCG-MCNC: 4.1 G/DL (ref 3.5–5.2)
ALBUMIN SERPL ELPH-MCNC: 3.5 G/DL (ref 3.7–5.1)
ALBUMIN SERPL-MCNC: 3.2 G/DL (ref 3.5–5)
ALBUMIN SERPL-MCNC: 3.5 G/DL (ref 3.5–5)
ALBUMIN SERPL-MCNC: 3.7 G/DL (ref 3.5–5)
ALBUMIN SERPL-MCNC: 3.8 G/DL (ref 3.5–5)
ALBUMIN SERPL-MCNC: 3.8 G/DL (ref 3.5–5)
ALBUMIN UR-MCNC: 100 MG/DL
ALBUMIN UR-MCNC: 200 MG/DL
ALBUMIN UR-MCNC: 70 MG/DL
ALP SERPL-CCNC: 52 U/L (ref 45–120)
ALP SERPL-CCNC: 59 U/L (ref 45–120)
ALP SERPL-CCNC: 59 U/L (ref 45–120)
ALP SERPL-CCNC: 64 U/L (ref 40–129)
ALP SERPL-CCNC: 64 U/L (ref 45–120)
ALPHA1 GLOB MFR UR ELPH: 2.8 %
ALPHA1 GLOB SERPL ELPH-MCNC: 0.3 G/DL (ref 0.2–0.4)
ALPHA2 GLOB MFR UR ELPH: 29.2 %
ALPHA2 GLOB SERPL ELPH-MCNC: 0.7 G/DL (ref 0.5–0.9)
ALT SERPL W P-5'-P-CCNC: 10 U/L (ref 0–45)
ALT SERPL W P-5'-P-CCNC: 11 U/L (ref 0–45)
ALT SERPL W P-5'-P-CCNC: 12 U/L (ref 0–45)
ALT SERPL W P-5'-P-CCNC: 14 U/L (ref 10–50)
ALT SERPL W P-5'-P-CCNC: 16 U/L (ref 0–45)
ANION GAP SERPL CALCULATED.3IONS-SCNC: 10 MMOL/L (ref 5–18)
ANION GAP SERPL CALCULATED.3IONS-SCNC: 11 MMOL/L (ref 5–18)
ANION GAP SERPL CALCULATED.3IONS-SCNC: 11 MMOL/L (ref 5–18)
ANION GAP SERPL CALCULATED.3IONS-SCNC: 14 MMOL/L (ref 5–18)
ANION GAP SERPL CALCULATED.3IONS-SCNC: 7 MMOL/L (ref 5–18)
ANION GAP SERPL CALCULATED.3IONS-SCNC: 8 MMOL/L (ref 5–18)
ANION GAP SERPL CALCULATED.3IONS-SCNC: 9 MMOL/L (ref 5–18)
ANION GAP SERPL CALCULATED.3IONS-SCNC: 9 MMOL/L (ref 7–15)
ANTIBODY SCREEN: NEGATIVE
APPEARANCE UR: ABNORMAL
AST SERPL W P-5'-P-CCNC: 19 U/L (ref 0–40)
AST SERPL W P-5'-P-CCNC: 19 U/L (ref 0–40)
AST SERPL W P-5'-P-CCNC: 23 U/L (ref 0–40)
AST SERPL W P-5'-P-CCNC: 29 U/L (ref 0–40)
AST SERPL W P-5'-P-CCNC: 33 U/L (ref 10–50)
ATRIAL RATE - MUSE: 70 BPM
ATRIAL RATE - MUSE: 75 BPM
B-GLOBULIN MFR UR ELPH: 9.1 %
B-GLOBULIN SERPL ELPH-MCNC: 0.7 G/DL (ref 0.6–1)
BACTERIA #/AREA URNS HPF: ABNORMAL /HPF
BACTERIA UR CULT: NO GROWTH
BACTERIA UR CULT: NORMAL
BASE EXCESS BLDV CALC-SCNC: 6.8 MMOL/L
BASE EXCESS BLDV CALC-SCNC: 9 MMOL/L
BASE EXCESS BLDV CALC-SCNC: 9.4 MMOL/L
BASOPHILS # BLD AUTO: 0 10E3/UL (ref 0–0.2)
BASOPHILS NFR BLD AUTO: 0 %
BILIRUB DIRECT SERPL-MCNC: <0.1 MG/DL
BILIRUB SERPL-MCNC: 0.3 MG/DL (ref 0–1)
BILIRUB SERPL-MCNC: 0.4 MG/DL
BILIRUB SERPL-MCNC: 0.5 MG/DL (ref 0–1)
BILIRUB UR QL STRIP: NEGATIVE
BNP SERPL-MCNC: 545 PG/ML (ref 0–91)
BUN SERPL-MCNC: 29 MG/DL (ref 8–23)
BUN SERPL-MCNC: 36 MG/DL (ref 8–28)
BUN SERPL-MCNC: 39 MG/DL (ref 8–28)
BUN SERPL-MCNC: 41 MG/DL (ref 8–28)
BUN SERPL-MCNC: 42 MG/DL (ref 8–28)
BUN SERPL-MCNC: 44 MG/DL (ref 8–28)
BUN SERPL-MCNC: 47 MG/DL (ref 8–28)
BUN SERPL-MCNC: 50 MG/DL (ref 8–28)
BUN SERPL-MCNC: 58 MG/DL (ref 8–28)
BUN SERPL-MCNC: 66 MG/DL (ref 8–28)
BUN SERPL-MCNC: 69 MG/DL (ref 8–28)
BUN SERPL-MCNC: 71 MG/DL (ref 8–28)
BUN SERPL-MCNC: 71 MG/DL (ref 8–28)
CALCIUM SERPL-MCNC: 10 MG/DL (ref 8.5–10.5)
CALCIUM SERPL-MCNC: 10.1 MG/DL (ref 8.5–10.5)
CALCIUM SERPL-MCNC: 8.8 MG/DL (ref 8.5–10.5)
CALCIUM SERPL-MCNC: 9 MG/DL (ref 8.5–10.5)
CALCIUM SERPL-MCNC: 9.1 MG/DL (ref 8.5–10.5)
CALCIUM SERPL-MCNC: 9.1 MG/DL (ref 8.5–10.5)
CALCIUM SERPL-MCNC: 9.2 MG/DL (ref 8.5–10.5)
CALCIUM SERPL-MCNC: 9.3 MG/DL (ref 8.5–10.5)
CALCIUM SERPL-MCNC: 9.4 MG/DL (ref 8.5–10.5)
CALCIUM SERPL-MCNC: 9.4 MG/DL (ref 8.8–10.2)
CALCIUM SERPL-MCNC: 9.5 MG/DL (ref 8.5–10.5)
CALCIUM SERPL-MCNC: 9.6 MG/DL (ref 8.5–10.5)
CALCIUM SERPL-MCNC: 9.7 MG/DL (ref 8.5–10.5)
CHLORIDE BLD-SCNC: 100 MMOL/L (ref 98–107)
CHLORIDE BLD-SCNC: 101 MMOL/L (ref 98–107)
CHLORIDE BLD-SCNC: 102 MMOL/L (ref 98–107)
CHLORIDE BLD-SCNC: 97 MMOL/L (ref 98–107)
CHLORIDE BLD-SCNC: 98 MMOL/L (ref 98–107)
CHLORIDE BLD-SCNC: 99 MMOL/L (ref 98–107)
CHLORIDE SERPL-SCNC: 98 MMOL/L (ref 98–107)
CK SERPL-CCNC: 140 U/L (ref 30–190)
CO2 SERPL-SCNC: 29 MMOL/L (ref 22–31)
CO2 SERPL-SCNC: 30 MMOL/L (ref 22–31)
CO2 SERPL-SCNC: 31 MMOL/L (ref 22–31)
CO2 SERPL-SCNC: 32 MMOL/L (ref 22–31)
CO2 SERPL-SCNC: 32 MMOL/L (ref 22–31)
CO2 SERPL-SCNC: 33 MMOL/L (ref 22–31)
CO2 SERPL-SCNC: 34 MMOL/L (ref 22–31)
CO2 SERPL-SCNC: 34 MMOL/L (ref 22–31)
CO2 SERPL-SCNC: 35 MMOL/L (ref 22–31)
CO2 SERPL-SCNC: 36 MMOL/L (ref 22–31)
CO2 SERPL-SCNC: 37 MMOL/L (ref 22–31)
CO2 SERPL-SCNC: 37 MMOL/L (ref 22–31)
COLOR UR AUTO: ABNORMAL
COLOR UR AUTO: ABNORMAL
COLOR UR AUTO: YELLOW
CREAT SERPL-MCNC: 1.94 MG/DL (ref 0.67–1.17)
CREAT SERPL-MCNC: 1.94 MG/DL (ref 0.7–1.3)
CREAT SERPL-MCNC: 2.08 MG/DL (ref 0.7–1.3)
CREAT SERPL-MCNC: 2.14 MG/DL (ref 0.7–1.3)
CREAT SERPL-MCNC: 2.21 MG/DL (ref 0.7–1.3)
CREAT SERPL-MCNC: 2.27 MG/DL (ref 0.7–1.3)
CREAT SERPL-MCNC: 2.42 MG/DL (ref 0.7–1.3)
CREAT SERPL-MCNC: 2.52 MG/DL (ref 0.7–1.3)
CREAT SERPL-MCNC: 2.76 MG/DL (ref 0.7–1.3)
CREAT SERPL-MCNC: 2.8 MG/DL (ref 0.7–1.3)
CREAT SERPL-MCNC: 3.09 MG/DL (ref 0.7–1.3)
CREAT SERPL-MCNC: 3.11 MG/DL (ref 0.7–1.3)
CREAT SERPL-MCNC: 3.19 MG/DL (ref 0.7–1.3)
CREAT UR-MCNC: 125 MG/DL
D DIMER PPP FEU-MCNC: 2.98 UG/ML FEU (ref 0–0.5)
DEPRECATED HCO3 PLAS-SCNC: 34 MMOL/L (ref 22–29)
DIASTOLIC BLOOD PRESSURE - MUSE: NORMAL MMHG
DIASTOLIC BLOOD PRESSURE - MUSE: NORMAL MMHG
EOSINOPHIL # BLD AUTO: 0 10E3/UL (ref 0–0.7)
EOSINOPHIL NFR BLD AUTO: 0 %
EOSINOPHIL NFR BLD AUTO: 0 %
EOSINOPHIL NFR BLD AUTO: 1 %
EOSINOPHIL NFR BLD AUTO: 1 %
ERYTHROCYTE [DISTWIDTH] IN BLOOD BY AUTOMATED COUNT: 15.1 % (ref 10–15)
ERYTHROCYTE [DISTWIDTH] IN BLOOD BY AUTOMATED COUNT: 15.3 % (ref 10–15)
ERYTHROCYTE [DISTWIDTH] IN BLOOD BY AUTOMATED COUNT: 15.4 % (ref 10–15)
ERYTHROCYTE [DISTWIDTH] IN BLOOD BY AUTOMATED COUNT: 15.6 % (ref 10–15)
ERYTHROCYTE [DISTWIDTH] IN BLOOD BY AUTOMATED COUNT: 15.7 % (ref 10–15)
ERYTHROCYTE [DISTWIDTH] IN BLOOD BY AUTOMATED COUNT: 15.8 % (ref 10–15)
ERYTHROCYTE [DISTWIDTH] IN BLOOD BY AUTOMATED COUNT: 16 % (ref 10–15)
ERYTHROCYTE [DISTWIDTH] IN BLOOD BY AUTOMATED COUNT: 16.1 % (ref 10–15)
FERRITIN SERPL-MCNC: 245 NG/ML (ref 31–409)
GAMMA GLOB MFR UR ELPH: 20.4 %
GAMMA GLOB SERPL ELPH-MCNC: 1 G/DL (ref 0.7–1.6)
GFR SERPL CREATININE-BSD FRML MDRD: 19 ML/MIN/1.73M2
GFR SERPL CREATININE-BSD FRML MDRD: 22 ML/MIN/1.73M2
GFR SERPL CREATININE-BSD FRML MDRD: 22 ML/MIN/1.73M2
GFR SERPL CREATININE-BSD FRML MDRD: 25 ML/MIN/1.73M2
GFR SERPL CREATININE-BSD FRML MDRD: 26 ML/MIN/1.73M2
GFR SERPL CREATININE-BSD FRML MDRD: 28 ML/MIN/1.73M2
GFR SERPL CREATININE-BSD FRML MDRD: 29 ML/MIN/1.73M2
GFR SERPL CREATININE-BSD FRML MDRD: 30 ML/MIN/1.73M2
GFR SERPL CREATININE-BSD FRML MDRD: 31 ML/MIN/1.73M2
GFR SERPL CREATININE-BSD FRML MDRD: 34 ML/MIN/1.73M2
GFR SERPL CREATININE-BSD FRML MDRD: 34 ML/MIN/1.73M2
GLUCOSE BLD-MCNC: 100 MG/DL (ref 70–125)
GLUCOSE BLD-MCNC: 101 MG/DL (ref 70–125)
GLUCOSE BLD-MCNC: 113 MG/DL (ref 70–125)
GLUCOSE BLD-MCNC: 115 MG/DL (ref 70–125)
GLUCOSE BLD-MCNC: 131 MG/DL (ref 70–125)
GLUCOSE BLD-MCNC: 134 MG/DL (ref 70–125)
GLUCOSE BLD-MCNC: 138 MG/DL (ref 70–125)
GLUCOSE BLD-MCNC: 143 MG/DL (ref 70–125)
GLUCOSE BLD-MCNC: 144 MG/DL (ref 70–125)
GLUCOSE BLD-MCNC: 146 MG/DL (ref 70–125)
GLUCOSE BLD-MCNC: 151 MG/DL (ref 70–125)
GLUCOSE BLD-MCNC: 152 MG/DL (ref 70–125)
GLUCOSE BLDC GLUCOMTR-MCNC: 89 MG/DL (ref 70–99)
GLUCOSE SERPL-MCNC: 119 MG/DL (ref 70–99)
GLUCOSE UR STRIP-MCNC: NEGATIVE MG/DL
HCO3 BLDV-SCNC: 33 MMOL/L (ref 24–30)
HCO3 BLDV-SCNC: 37 MMOL/L (ref 24–30)
HCO3 BLDV-SCNC: 37 MMOL/L (ref 24–30)
HCT VFR BLD AUTO: 29.2 % (ref 40–53)
HCT VFR BLD AUTO: 29.6 % (ref 40–53)
HCT VFR BLD AUTO: 29.7 % (ref 40–53)
HCT VFR BLD AUTO: 30.1 % (ref 40–53)
HCT VFR BLD AUTO: 30.3 % (ref 40–53)
HCT VFR BLD AUTO: 31.5 % (ref 40–53)
HCT VFR BLD AUTO: 32.2 % (ref 40–53)
HCT VFR BLD AUTO: 32.5 % (ref 40–53)
HCT VFR BLD AUTO: 32.5 % (ref 40–53)
HCT VFR BLD AUTO: 33.8 % (ref 40–53)
HGB BLD-MCNC: 8.6 G/DL (ref 13.3–17.7)
HGB BLD-MCNC: 8.7 G/DL (ref 13.3–17.7)
HGB BLD-MCNC: 8.9 G/DL (ref 13.3–17.7)
HGB BLD-MCNC: 9 G/DL (ref 13.3–17.7)
HGB BLD-MCNC: 9.1 G/DL (ref 13.3–17.7)
HGB BLD-MCNC: 9.4 G/DL (ref 13.3–17.7)
HGB BLD-MCNC: 9.5 G/DL (ref 13.3–17.7)
HGB BLD-MCNC: 9.6 G/DL (ref 13.3–17.7)
HGB BLD-MCNC: 9.7 G/DL (ref 13.3–17.7)
HGB UR QL STRIP: ABNORMAL
HOLD SPECIMEN: NORMAL
HOLD SPECIMEN: NORMAL
HYALINE CASTS: 4 /LPF
IMM GRANULOCYTES # BLD: 0 10E3/UL
IMM GRANULOCYTES # BLD: 0.1 10E3/UL
IMM GRANULOCYTES NFR BLD: 1 %
INTERPRETATION ECG - MUSE: NORMAL
INTERPRETATION ECG - MUSE: NORMAL
IRON BINDING CAPACITY (ROCHE): 208 UG/DL (ref 240–430)
IRON SATN MFR SERPL: 19 % (ref 15–46)
IRON SERPL-MCNC: 40 UG/DL (ref 61–157)
KETONES UR STRIP-MCNC: NEGATIVE MG/DL
LACTATE SERPL-SCNC: 1.1 MMOL/L (ref 0.7–2)
LACTATE SERPL-SCNC: 1.2 MMOL/L (ref 0.7–2)
LACTATE SERPL-SCNC: 1.5 MMOL/L (ref 0.7–2)
LDH SERPL L TO P-CCNC: 278 U/L (ref 0–250)
LEUKOCYTE ESTERASE UR QL STRIP: ABNORMAL
LEUKOCYTE ESTERASE UR QL STRIP: ABNORMAL
LEUKOCYTE ESTERASE UR QL STRIP: NEGATIVE
LVEF ECHO: NORMAL
LYMPHOCYTES # BLD AUTO: 1.2 10E3/UL (ref 0.8–5.3)
LYMPHOCYTES # BLD AUTO: 1.6 10E3/UL (ref 0.8–5.3)
LYMPHOCYTES # BLD AUTO: 1.7 10E3/UL (ref 0.8–5.3)
LYMPHOCYTES # BLD AUTO: 2.2 10E3/UL (ref 0.8–5.3)
LYMPHOCYTES NFR BLD AUTO: 16 %
LYMPHOCYTES NFR BLD AUTO: 24 %
LYMPHOCYTES NFR BLD AUTO: 26 %
LYMPHOCYTES NFR BLD AUTO: 32 %
M PROTEIN MFR UR ELPH: 0 %
M PROTEIN SERPL ELPH-MCNC: 0 G/DL
MAGNESIUM SERPL-MCNC: 2.3 MG/DL (ref 1.8–2.6)
MAGNESIUM SERPL-MCNC: 2.4 MG/DL (ref 1.8–2.6)
MAGNESIUM SERPL-MCNC: 2.5 MG/DL (ref 1.8–2.6)
MAGNESIUM SERPL-MCNC: 2.5 MG/DL (ref 1.8–2.6)
MCH RBC QN AUTO: 28.1 PG (ref 26.5–33)
MCH RBC QN AUTO: 28.2 PG (ref 26.5–33)
MCH RBC QN AUTO: 28.5 PG (ref 26.5–33)
MCH RBC QN AUTO: 28.5 PG (ref 26.5–33)
MCH RBC QN AUTO: 28.6 PG (ref 26.5–33)
MCH RBC QN AUTO: 28.7 PG (ref 26.5–33)
MCH RBC QN AUTO: 28.7 PG (ref 26.5–33)
MCH RBC QN AUTO: 28.8 PG (ref 26.5–33)
MCH RBC QN AUTO: 28.8 PG (ref 26.5–33)
MCH RBC QN AUTO: 29.1 PG (ref 26.5–33)
MCHC RBC AUTO-ENTMCNC: 28.4 G/DL (ref 31.5–36.5)
MCHC RBC AUTO-ENTMCNC: 28.4 G/DL (ref 31.5–36.5)
MCHC RBC AUTO-ENTMCNC: 28.6 G/DL (ref 31.5–36.5)
MCHC RBC AUTO-ENTMCNC: 28.9 G/DL (ref 31.5–36.5)
MCHC RBC AUTO-ENTMCNC: 29.3 G/DL (ref 31.5–36.5)
MCHC RBC AUTO-ENTMCNC: 29.5 G/DL (ref 31.5–36.5)
MCHC RBC AUTO-ENTMCNC: 29.6 G/DL (ref 31.5–36.5)
MCHC RBC AUTO-ENTMCNC: 29.8 G/DL (ref 31.5–36.5)
MCHC RBC AUTO-ENTMCNC: 30.4 G/DL (ref 31.5–36.5)
MCHC RBC AUTO-ENTMCNC: 30.8 G/DL (ref 31.5–36.5)
MCV RBC AUTO: 100 FL (ref 78–100)
MCV RBC AUTO: 100 FL (ref 78–100)
MCV RBC AUTO: 101 FL (ref 78–100)
MCV RBC AUTO: 95 FL (ref 78–100)
MCV RBC AUTO: 96 FL (ref 78–100)
MCV RBC AUTO: 97 FL (ref 78–100)
MONOCYTES # BLD AUTO: 0.6 10E3/UL (ref 0–1.3)
MONOCYTES # BLD AUTO: 0.6 10E3/UL (ref 0–1.3)
MONOCYTES # BLD AUTO: 0.7 10E3/UL (ref 0–1.3)
MONOCYTES # BLD AUTO: 0.8 10E3/UL (ref 0–1.3)
MONOCYTES NFR BLD AUTO: 10 %
MONOCYTES NFR BLD AUTO: 11 %
MONOCYTES NFR BLD AUTO: 9 %
MONOCYTES NFR BLD AUTO: 9 %
MUCOUS THREADS #/AREA URNS LPF: PRESENT /LPF
MUCOUS THREADS #/AREA URNS LPF: PRESENT /LPF
NEUTROPHILS # BLD AUTO: 4 10E3/UL (ref 1.6–8.3)
NEUTROPHILS # BLD AUTO: 4 10E3/UL (ref 1.6–8.3)
NEUTROPHILS # BLD AUTO: 4.5 10E3/UL (ref 1.6–8.3)
NEUTROPHILS # BLD AUTO: 5.5 10E3/UL (ref 1.6–8.3)
NEUTROPHILS NFR BLD AUTO: 56 %
NEUTROPHILS NFR BLD AUTO: 63 %
NEUTROPHILS NFR BLD AUTO: 64 %
NEUTROPHILS NFR BLD AUTO: 74 %
NITRATE UR QL: NEGATIVE
NRBC # BLD AUTO: 0 10E3/UL
NRBC BLD AUTO-RTO: 0 /100
OXYHGB MFR BLDV: 49.1 % (ref 70–75)
OXYHGB MFR BLDV: 60.5 % (ref 70–75)
OXYHGB MFR BLDV: 90.6 % (ref 70–75)
P AXIS - MUSE: 63 DEGREES
P AXIS - MUSE: 63 DEGREES
PATH REPORT.ADDENDUM SPEC: ABNORMAL
PATH REPORT.COMMENTS IMP SPEC: ABNORMAL
PATH REPORT.COMMENTS IMP SPEC: YES
PATH REPORT.FINAL DX SPEC: ABNORMAL
PATH REPORT.GROSS SPEC: ABNORMAL
PATH REPORT.MICROSCOPIC SPEC OTHER STN: ABNORMAL
PATH REPORT.RELEVANT HX SPEC: ABNORMAL
PCO2 BLDV: 60 MM HG (ref 35–50)
PCO2 BLDV: 83 MM HG (ref 35–50)
PCO2 BLDV: 97 MM HG (ref 35–50)
PH BLDV: 7.19 [PH] (ref 7.35–7.45)
PH BLDV: 7.26 [PH] (ref 7.35–7.45)
PH BLDV: 7.35 [PH] (ref 7.35–7.45)
PH UR STRIP: 5 [PH] (ref 5–7)
PH UR STRIP: 5.5 [PH] (ref 5–7)
PH UR STRIP: 6.5 [PH] (ref 5–7)
PHOSPHATE SERPL-MCNC: 2 MG/DL (ref 2.5–4.5)
PHOTO IMAGE: ABNORMAL
PLATELET # BLD AUTO: 152 10E3/UL (ref 150–450)
PLATELET # BLD AUTO: 162 10E3/UL (ref 150–450)
PLATELET # BLD AUTO: 162 10E3/UL (ref 150–450)
PLATELET # BLD AUTO: 171 10E3/UL (ref 150–450)
PLATELET # BLD AUTO: 174 10E3/UL (ref 150–450)
PLATELET # BLD AUTO: 178 10E3/UL (ref 150–450)
PLATELET # BLD AUTO: 181 10E3/UL (ref 150–450)
PLATELET # BLD AUTO: 182 10E3/UL (ref 150–450)
PLATELET # BLD AUTO: 199 10E3/UL (ref 150–450)
PLATELET # BLD AUTO: 203 10E3/UL (ref 150–450)
PO2 BLDV: 27 MM HG (ref 25–47)
PO2 BLDV: 34 MM HG (ref 25–47)
PO2 BLDV: 63 MM HG (ref 25–47)
POTASSIUM BLD-SCNC: 4.1 MMOL/L (ref 3.5–5)
POTASSIUM BLD-SCNC: 4.3 MMOL/L (ref 3.5–5)
POTASSIUM BLD-SCNC: 4.6 MMOL/L (ref 3.5–5)
POTASSIUM BLD-SCNC: 4.8 MMOL/L (ref 3.5–5)
POTASSIUM BLD-SCNC: 4.8 MMOL/L (ref 3.5–5)
POTASSIUM BLD-SCNC: 4.9 MMOL/L (ref 3.5–5)
POTASSIUM BLD-SCNC: 5 MMOL/L (ref 3.5–5)
POTASSIUM BLD-SCNC: 5 MMOL/L (ref 3.5–5)
POTASSIUM BLD-SCNC: 5.2 MMOL/L (ref 3.5–5)
POTASSIUM BLD-SCNC: 5.3 MMOL/L (ref 3.5–5)
POTASSIUM BLD-SCNC: 5.4 MMOL/L (ref 3.5–5)
POTASSIUM BLD-SCNC: 5.4 MMOL/L (ref 3.5–5)
POTASSIUM BLD-SCNC: 5.7 MMOL/L (ref 3.5–5)
POTASSIUM SERPL-SCNC: 4.8 MMOL/L (ref 3.4–5.3)
PR INTERVAL - MUSE: 142 MS
PR INTERVAL - MUSE: 154 MS
PROT PATTERN SERPL ELPH-IMP: ABNORMAL
PROT PATTERN UR ELPH-IMP: ABNORMAL
PROT SERPL-MCNC: 6.6 G/DL (ref 6–8)
PROT SERPL-MCNC: 6.9 G/DL (ref 6–8)
PROT SERPL-MCNC: 7.2 G/DL (ref 6–8)
PROT SERPL-MCNC: 7.2 G/DL (ref 6–8)
PROT SERPL-MCNC: 7.5 G/DL (ref 6.4–8.3)
PROT/CREAT 24H UR: 0.89 MG/MG CR
QRS DURATION - MUSE: 100 MS
QRS DURATION - MUSE: 98 MS
QT - MUSE: 362 MS
QT - MUSE: 376 MS
QTC - MUSE: 390 MS
QTC - MUSE: 419 MS
R AXIS - MUSE: 12 DEGREES
R AXIS - MUSE: 30 DEGREES
RBC # BLD AUTO: 3.02 10E6/UL (ref 4.4–5.9)
RBC # BLD AUTO: 3.05 10E6/UL (ref 4.4–5.9)
RBC # BLD AUTO: 3.09 10E6/UL (ref 4.4–5.9)
RBC # BLD AUTO: 3.12 10E6/UL (ref 4.4–5.9)
RBC # BLD AUTO: 3.14 10E6/UL (ref 4.4–5.9)
RBC # BLD AUTO: 3.16 10E6/UL (ref 4.4–5.9)
RBC # BLD AUTO: 3.31 10E6/UL (ref 4.4–5.9)
RBC # BLD AUTO: 3.34 10E6/UL (ref 4.4–5.9)
RBC # BLD AUTO: 3.36 10E6/UL (ref 4.4–5.9)
RBC # BLD AUTO: 3.37 10E6/UL (ref 4.4–5.9)
RBC URINE: >182 /HPF
RETICS # AUTO: 0.06 10E6/UL (ref 0.01–0.11)
RETICS/RBC NFR AUTO: 2.1 % (ref 0.8–2.7)
RETINOPATHY: NORMAL
SAO2 % BLDV: 50.1 % (ref 70–75)
SAO2 % BLDV: 61.9 % (ref 70–75)
SAO2 % BLDV: 92.4 % (ref 70–75)
SODIUM SERPL-SCNC: 140 MMOL/L (ref 136–145)
SODIUM SERPL-SCNC: 140 MMOL/L (ref 136–145)
SODIUM SERPL-SCNC: 141 MMOL/L (ref 136–145)
SODIUM SERPL-SCNC: 141 MMOL/L (ref 136–145)
SODIUM SERPL-SCNC: 142 MMOL/L (ref 136–145)
SODIUM SERPL-SCNC: 143 MMOL/L (ref 136–145)
SODIUM SERPL-SCNC: 144 MMOL/L (ref 136–145)
SODIUM SERPL-SCNC: 144 MMOL/L (ref 136–145)
SODIUM SERPL-SCNC: 146 MMOL/L (ref 136–145)
SP GR UR STRIP: 1.01 (ref 1–1.03)
SP GR UR STRIP: 1.01 (ref 1–1.03)
SP GR UR STRIP: 1.02 (ref 1–1.03)
SPECIMEN EXPIRATION DATE: NORMAL
SQUAMOUS EPITHELIAL: 2 /HPF
SQUAMOUS EPITHELIAL: <1 /HPF
SYSTOLIC BLOOD PRESSURE - MUSE: NORMAL MMHG
SYSTOLIC BLOOD PRESSURE - MUSE: NORMAL MMHG
T AXIS - MUSE: 73 DEGREES
T AXIS - MUSE: 80 DEGREES
TOTAL PROTEIN SERUM FOR ELP: 6.2 G/DL (ref 6.4–8.3)
TRANSFERRIN SERPL-MCNC: 190 MG/DL (ref 200–360)
TROPONIN I SERPL-MCNC: 0.02 NG/ML (ref 0–0.29)
TROPONIN I SERPL-MCNC: 0.04 NG/ML (ref 0–0.29)
TROPONIN I SERPL-MCNC: 0.05 NG/ML (ref 0–0.29)
TROPONIN I SERPL-MCNC: 0.05 NG/ML (ref 0–0.29)
UROBILINOGEN UR STRIP-MCNC: <2 MG/DL
VENTRICULAR RATE- MUSE: 70 BPM
VENTRICULAR RATE- MUSE: 75 BPM
VIT B12 SERPL-MCNC: 454 PG/ML (ref 232–1245)
WBC # BLD AUTO: 10.6 10E3/UL (ref 4–11)
WBC # BLD AUTO: 6.3 10E3/UL (ref 4–11)
WBC # BLD AUTO: 6.5 10E3/UL (ref 4–11)
WBC # BLD AUTO: 6.9 10E3/UL (ref 4–11)
WBC # BLD AUTO: 7 10E3/UL (ref 4–11)
WBC # BLD AUTO: 7.4 10E3/UL (ref 4–11)
WBC # BLD AUTO: 7.4 10E3/UL (ref 4–11)
WBC # BLD AUTO: 8.8 10E3/UL (ref 4–11)
WBC # BLD AUTO: 9.4 10E3/UL (ref 4–11)
WBC # BLD AUTO: 9.4 10E3/UL (ref 4–11)
WBC URINE: 0 /HPF
WBC URINE: 92 /HPF
WBC URINE: >182 /HPF

## 2023-01-01 PROCEDURE — 250N000013 HC RX MED GY IP 250 OP 250 PS 637: Performed by: INTERNAL MEDICINE

## 2023-01-01 PROCEDURE — 96361 HYDRATE IV INFUSION ADD-ON: CPT

## 2023-01-01 PROCEDURE — 250N000013 HC RX MED GY IP 250 OP 250 PS 637

## 2023-01-01 PROCEDURE — 36415 COLL VENOUS BLD VENIPUNCTURE: CPT | Performed by: STUDENT IN AN ORGANIZED HEALTH CARE EDUCATION/TRAINING PROGRAM

## 2023-01-01 PROCEDURE — 83735 ASSAY OF MAGNESIUM: CPT | Performed by: INTERNAL MEDICINE

## 2023-01-01 PROCEDURE — 250N000009 HC RX 250: Performed by: INTERNAL MEDICINE

## 2023-01-01 PROCEDURE — 97535 SELF CARE MNGMENT TRAINING: CPT | Mod: GO

## 2023-01-01 PROCEDURE — 250N000011 HC RX IP 250 OP 636: Performed by: INTERNAL MEDICINE

## 2023-01-01 PROCEDURE — 80053 COMPREHEN METABOLIC PANEL: CPT | Mod: ORL | Performed by: FAMILY MEDICINE

## 2023-01-01 PROCEDURE — 84165 PROTEIN E-PHORESIS SERUM: CPT | Mod: TC | Performed by: PATHOLOGY

## 2023-01-01 PROCEDURE — 84132 ASSAY OF SERUM POTASSIUM: CPT | Performed by: INTERNAL MEDICINE

## 2023-01-01 PROCEDURE — 99233 SBSQ HOSP IP/OBS HIGH 50: CPT | Performed by: PHYSICIAN ASSISTANT

## 2023-01-01 PROCEDURE — 84484 ASSAY OF TROPONIN QUANT: CPT | Performed by: STUDENT IN AN ORGANIZED HEALTH CARE EDUCATION/TRAINING PROGRAM

## 2023-01-01 PROCEDURE — 99233 SBSQ HOSP IP/OBS HIGH 50: CPT | Performed by: INTERNAL MEDICINE

## 2023-01-01 PROCEDURE — G0378 HOSPITAL OBSERVATION PER HR: HCPCS

## 2023-01-01 PROCEDURE — 99223 1ST HOSP IP/OBS HIGH 75: CPT | Performed by: INTERNAL MEDICINE

## 2023-01-01 PROCEDURE — 210N000002 HC R&B HEART CARE

## 2023-01-01 PROCEDURE — 99238 HOSP IP/OBS DSCHRG MGMT 30/<: CPT | Performed by: INTERNAL MEDICINE

## 2023-01-01 PROCEDURE — 85014 HEMATOCRIT: CPT | Performed by: INTERNAL MEDICINE

## 2023-01-01 PROCEDURE — 85025 COMPLETE CBC W/AUTO DIFF WBC: CPT | Performed by: STUDENT IN AN ORGANIZED HEALTH CARE EDUCATION/TRAINING PROGRAM

## 2023-01-01 PROCEDURE — 250N000011 HC RX IP 250 OP 636: Performed by: HOSPITALIST

## 2023-01-01 PROCEDURE — 99232 SBSQ HOSP IP/OBS MODERATE 35: CPT | Performed by: PHYSICIAN ASSISTANT

## 2023-01-01 PROCEDURE — 36415 COLL VENOUS BLD VENIPUNCTURE: CPT | Performed by: INTERNAL MEDICINE

## 2023-01-01 PROCEDURE — 93010 ELECTROCARDIOGRAM REPORT: CPT | Mod: HIP | Performed by: INTERNAL MEDICINE

## 2023-01-01 PROCEDURE — 120N000004 HC R&B MS OVERFLOW

## 2023-01-01 PROCEDURE — 258N000003 HC RX IP 258 OP 636: Performed by: INTERNAL MEDICINE

## 2023-01-01 PROCEDURE — 80048 BASIC METABOLIC PNL TOTAL CA: CPT | Performed by: PHYSICIAN ASSISTANT

## 2023-01-01 PROCEDURE — 85027 COMPLETE CBC AUTOMATED: CPT | Performed by: INTERNAL MEDICINE

## 2023-01-01 PROCEDURE — 250N000013 HC RX MED GY IP 250 OP 250 PS 637: Performed by: HOSPITALIST

## 2023-01-01 PROCEDURE — 258N000003 HC RX IP 258 OP 636: Performed by: PHYSICIAN ASSISTANT

## 2023-01-01 PROCEDURE — 93005 ELECTROCARDIOGRAM TRACING: CPT | Performed by: INTERNAL MEDICINE

## 2023-01-01 PROCEDURE — 85045 AUTOMATED RETICULOCYTE COUNT: CPT | Performed by: INTERNAL MEDICINE

## 2023-01-01 PROCEDURE — 84156 ASSAY OF PROTEIN URINE: CPT | Performed by: INTERNAL MEDICINE

## 2023-01-01 PROCEDURE — 93306 TTE W/DOPPLER COMPLETE: CPT | Mod: 26 | Performed by: INTERNAL MEDICINE

## 2023-01-01 PROCEDURE — 81001 URINALYSIS AUTO W/SCOPE: CPT | Performed by: INTERNAL MEDICINE

## 2023-01-01 PROCEDURE — 82310 ASSAY OF CALCIUM: CPT | Performed by: INTERNAL MEDICINE

## 2023-01-01 PROCEDURE — 250N000012 HC RX MED GY IP 250 OP 636 PS 637: Performed by: INTERNAL MEDICINE

## 2023-01-01 PROCEDURE — 94660 CPAP INITIATION&MGMT: CPT

## 2023-01-01 PROCEDURE — 88333 PATH CONSLTJ SURG CYTO XM 1: CPT | Mod: TC | Performed by: INTERNAL MEDICINE

## 2023-01-01 PROCEDURE — 71045 X-RAY EXAM CHEST 1 VIEW: CPT

## 2023-01-01 PROCEDURE — 83605 ASSAY OF LACTIC ACID: CPT | Performed by: INTERNAL MEDICINE

## 2023-01-01 PROCEDURE — 250N000011 HC RX IP 250 OP 636: Performed by: RADIOLOGY

## 2023-01-01 PROCEDURE — 250N000011 HC RX IP 250 OP 636: Performed by: PHYSICIAN ASSISTANT

## 2023-01-01 PROCEDURE — 85025 COMPLETE CBC W/AUTO DIFF WBC: CPT | Performed by: INTERNAL MEDICINE

## 2023-01-01 PROCEDURE — 999N000065 XR CHEST 1 VIEW

## 2023-01-01 PROCEDURE — 86901 BLOOD TYPING SEROLOGIC RH(D): CPT | Performed by: STUDENT IN AN ORGANIZED HEALTH CARE EDUCATION/TRAINING PROGRAM

## 2023-01-01 PROCEDURE — 99222 1ST HOSP IP/OBS MODERATE 55: CPT | Performed by: INTERNAL MEDICINE

## 2023-01-01 PROCEDURE — 96376 TX/PRO/DX INJ SAME DRUG ADON: CPT

## 2023-01-01 PROCEDURE — 120N000001 HC R&B MED SURG/OB

## 2023-01-01 PROCEDURE — 250N000013 HC RX MED GY IP 250 OP 250 PS 637: Performed by: EMERGENCY MEDICINE

## 2023-01-01 PROCEDURE — 83880 ASSAY OF NATRIURETIC PEPTIDE: CPT | Performed by: INTERNAL MEDICINE

## 2023-01-01 PROCEDURE — 250N000009 HC RX 250: Performed by: RADIOLOGY

## 2023-01-01 PROCEDURE — 82805 BLOOD GASES W/O2 SATURATION: CPT | Performed by: INTERNAL MEDICINE

## 2023-01-01 PROCEDURE — 83550 IRON BINDING TEST: CPT | Performed by: INTERNAL MEDICINE

## 2023-01-01 PROCEDURE — 93306 TTE W/DOPPLER COMPLETE: CPT

## 2023-01-01 PROCEDURE — 84484 ASSAY OF TROPONIN QUANT: CPT | Performed by: INTERNAL MEDICINE

## 2023-01-01 PROCEDURE — 93005 ELECTROCARDIOGRAM TRACING: CPT | Performed by: STUDENT IN AN ORGANIZED HEALTH CARE EDUCATION/TRAINING PROGRAM

## 2023-01-01 PROCEDURE — 272N000035 NM LUNG SCAN VENTILATION AND PERFUSION

## 2023-01-01 PROCEDURE — G0463 HOSPITAL OUTPT CLINIC VISIT: HCPCS | Performed by: INTERNAL MEDICINE

## 2023-01-01 PROCEDURE — 258N000003 HC RX IP 258 OP 636

## 2023-01-01 PROCEDURE — 99204 OFFICE O/P NEW MOD 45 MIN: CPT | Performed by: INTERNAL MEDICINE

## 2023-01-01 PROCEDURE — 87086 URINE CULTURE/COLONY COUNT: CPT | Performed by: INTERNAL MEDICINE

## 2023-01-01 PROCEDURE — 72100 X-RAY EXAM L-S SPINE 2/3 VWS: CPT

## 2023-01-01 PROCEDURE — G1010 CDSM STANSON: HCPCS

## 2023-01-01 PROCEDURE — 80053 COMPREHEN METABOLIC PANEL: CPT | Performed by: INTERNAL MEDICINE

## 2023-01-01 PROCEDURE — 80048 BASIC METABOLIC PNL TOTAL CA: CPT | Performed by: STUDENT IN AN ORGANIZED HEALTH CARE EDUCATION/TRAINING PROGRAM

## 2023-01-01 PROCEDURE — 36415 COLL VENOUS BLD VENIPUNCTURE: CPT | Performed by: PHYSICIAN ASSISTANT

## 2023-01-01 PROCEDURE — 82248 BILIRUBIN DIRECT: CPT | Performed by: INTERNAL MEDICINE

## 2023-01-01 PROCEDURE — 99231 SBSQ HOSP IP/OBS SF/LOW 25: CPT | Performed by: CLINICAL NURSE SPECIALIST

## 2023-01-01 PROCEDURE — 84166 PROTEIN E-PHORESIS/URINE/CSF: CPT | Performed by: PATHOLOGY

## 2023-01-01 PROCEDURE — 86850 RBC ANTIBODY SCREEN: CPT | Performed by: STUDENT IN AN ORGANIZED HEALTH CARE EDUCATION/TRAINING PROGRAM

## 2023-01-01 PROCEDURE — 0BBF3ZX EXCISION OF RIGHT LOWER LUNG LOBE, PERCUTANEOUS APPROACH, DIAGNOSTIC: ICD-10-PCS | Performed by: RADIOLOGY

## 2023-01-01 PROCEDURE — 99223 1ST HOSP IP/OBS HIGH 75: CPT | Performed by: CLINICAL NURSE SPECIALIST

## 2023-01-01 PROCEDURE — 84466 ASSAY OF TRANSFERRIN: CPT | Performed by: INTERNAL MEDICINE

## 2023-01-01 PROCEDURE — 258N000003 HC RX IP 258 OP 636: Performed by: STUDENT IN AN ORGANIZED HEALTH CARE EDUCATION/TRAINING PROGRAM

## 2023-01-01 PROCEDURE — 97162 PT EVAL MOD COMPLEX 30 MIN: CPT | Mod: GP

## 2023-01-01 PROCEDURE — 97110 THERAPEUTIC EXERCISES: CPT | Mod: GP

## 2023-01-01 PROCEDURE — 82550 ASSAY OF CK (CPK): CPT | Performed by: STUDENT IN AN ORGANIZED HEALTH CARE EDUCATION/TRAINING PROGRAM

## 2023-01-01 PROCEDURE — 99221 1ST HOSP IP/OBS SF/LOW 40: CPT | Performed by: PHYSICIAN ASSISTANT

## 2023-01-01 PROCEDURE — 85025 COMPLETE CBC W/AUTO DIFF WBC: CPT | Mod: ORL | Performed by: FAMILY MEDICINE

## 2023-01-01 PROCEDURE — A9567 TECHNETIUM TC-99M AEROSOL: HCPCS | Performed by: INTERNAL MEDICINE

## 2023-01-01 PROCEDURE — 97530 THERAPEUTIC ACTIVITIES: CPT | Mod: GP

## 2023-01-01 PROCEDURE — 84155 ASSAY OF PROTEIN SERUM: CPT

## 2023-01-01 PROCEDURE — 88342 IMHCHEM/IMCYTCHM 1ST ANTB: CPT | Mod: 26 | Performed by: PATHOLOGY

## 2023-01-01 PROCEDURE — 99232 SBSQ HOSP IP/OBS MODERATE 35: CPT | Performed by: CLINICAL NURSE SPECIALIST

## 2023-01-01 PROCEDURE — 94640 AIRWAY INHALATION TREATMENT: CPT | Mod: 76

## 2023-01-01 PROCEDURE — 97116 GAIT TRAINING THERAPY: CPT | Mod: GP

## 2023-01-01 PROCEDURE — 80048 BASIC METABOLIC PNL TOTAL CA: CPT | Performed by: INTERNAL MEDICINE

## 2023-01-01 PROCEDURE — 72070 X-RAY EXAM THORAC SPINE 2VWS: CPT

## 2023-01-01 PROCEDURE — 250N000013 HC RX MED GY IP 250 OP 250 PS 637: Performed by: PHYSICIAN ASSISTANT

## 2023-01-01 PROCEDURE — 32408 CORE NDL BX LNG/MED PERQ: CPT

## 2023-01-01 PROCEDURE — 272N000221 CT LUNG MEDIASTINUM BIOPSY

## 2023-01-01 PROCEDURE — 71046 X-RAY EXAM CHEST 2 VIEWS: CPT

## 2023-01-01 PROCEDURE — 99285 EMERGENCY DEPT VISIT HI MDM: CPT | Mod: 25

## 2023-01-01 PROCEDURE — A9540 TC99M MAA: HCPCS | Performed by: INTERNAL MEDICINE

## 2023-01-01 PROCEDURE — 82607 VITAMIN B-12: CPT | Performed by: INTERNAL MEDICINE

## 2023-01-01 PROCEDURE — 250N000011 HC RX IP 250 OP 636: Mod: JZ | Performed by: INTERNAL MEDICINE

## 2023-01-01 PROCEDURE — 85379 FIBRIN DEGRADATION QUANT: CPT | Performed by: INTERNAL MEDICINE

## 2023-01-01 PROCEDURE — 99222 1ST HOSP IP/OBS MODERATE 55: CPT | Performed by: NURSE PRACTITIONER

## 2023-01-01 PROCEDURE — 85018 HEMOGLOBIN: CPT | Performed by: INTERNAL MEDICINE

## 2023-01-01 PROCEDURE — 84166 PROTEIN E-PHORESIS/URINE/CSF: CPT | Mod: 26

## 2023-01-01 PROCEDURE — 999N000157 HC STATISTIC RCP TIME EA 10 MIN

## 2023-01-01 PROCEDURE — 96374 THER/PROPH/DIAG INJ IV PUSH: CPT

## 2023-01-01 PROCEDURE — 97166 OT EVAL MOD COMPLEX 45 MIN: CPT | Mod: GO

## 2023-01-01 PROCEDURE — 80069 RENAL FUNCTION PANEL: CPT | Performed by: PHYSICIAN ASSISTANT

## 2023-01-01 PROCEDURE — 84165 PROTEIN E-PHORESIS SERUM: CPT | Mod: 26

## 2023-01-01 PROCEDURE — 93005 ELECTROCARDIOGRAM TRACING: CPT

## 2023-01-01 PROCEDURE — 99222 1ST HOSP IP/OBS MODERATE 55: CPT

## 2023-01-01 PROCEDURE — 99497 ADVNCD CARE PLAN 30 MIN: CPT | Performed by: CLINICAL NURSE SPECIALIST

## 2023-01-01 PROCEDURE — 82728 ASSAY OF FERRITIN: CPT | Performed by: INTERNAL MEDICINE

## 2023-01-01 PROCEDURE — 343N000001 HC RX 343: Performed by: INTERNAL MEDICINE

## 2023-01-01 PROCEDURE — 83605 ASSAY OF LACTIC ACID: CPT | Performed by: STUDENT IN AN ORGANIZED HEALTH CARE EDUCATION/TRAINING PROGRAM

## 2023-01-01 PROCEDURE — 36415 COLL VENOUS BLD VENIPUNCTURE: CPT

## 2023-01-01 PROCEDURE — 93970 EXTREMITY STUDY: CPT

## 2023-01-01 PROCEDURE — 83615 LACTATE (LD) (LDH) ENZYME: CPT | Performed by: INTERNAL MEDICINE

## 2023-01-01 PROCEDURE — 88305 TISSUE EXAM BY PATHOLOGIST: CPT | Mod: 26 | Performed by: PATHOLOGY

## 2023-01-01 PROCEDURE — 88333 PATH CONSLTJ SURG CYTO XM 1: CPT | Mod: 26 | Performed by: PATHOLOGY

## 2023-01-01 PROCEDURE — 99233 SBSQ HOSP IP/OBS HIGH 50: CPT | Mod: 25 | Performed by: CLINICAL NURSE SPECIALIST

## 2023-01-01 PROCEDURE — 81001 URINALYSIS AUTO W/SCOPE: CPT | Performed by: STUDENT IN AN ORGANIZED HEALTH CARE EDUCATION/TRAINING PROGRAM

## 2023-01-01 PROCEDURE — 88341 IMHCHEM/IMCYTCHM EA ADD ANTB: CPT | Mod: 26 | Performed by: PATHOLOGY

## 2023-01-01 RX ORDER — HYDROMORPHONE HYDROCHLORIDE 2 MG/1
2 TABLET ORAL EVERY 4 HOURS PRN
Status: DISCONTINUED | OUTPATIENT
Start: 2023-01-01 | End: 2023-01-01

## 2023-01-01 RX ORDER — AMLODIPINE BESYLATE 10 MG/1
10 TABLET ORAL DAILY
Qty: 30 TABLET | Refills: 0 | Status: SHIPPED | OUTPATIENT
Start: 2023-01-01

## 2023-01-01 RX ORDER — NALOXONE HYDROCHLORIDE 0.4 MG/ML
0.2 INJECTION, SOLUTION INTRAMUSCULAR; INTRAVENOUS; SUBCUTANEOUS
Status: DISCONTINUED | OUTPATIENT
Start: 2023-01-01 | End: 2023-01-01 | Stop reason: HOSPADM

## 2023-01-01 RX ORDER — OXYCODONE AND ACETAMINOPHEN 5; 325 MG/1; MG/1
1 TABLET ORAL EVERY 4 HOURS PRN
Status: DISCONTINUED | OUTPATIENT
Start: 2023-01-01 | End: 2023-01-01

## 2023-01-01 RX ORDER — PANTOPRAZOLE SODIUM 40 MG/1
40 TABLET, DELAYED RELEASE ORAL
Qty: 30 TABLET | Refills: 0 | Status: SHIPPED | OUTPATIENT
Start: 2023-01-01

## 2023-01-01 RX ORDER — LIDOCAINE 4 G/G
1-3 PATCH TOPICAL EVERY 24 HOURS
Qty: 45 PATCH | Refills: 0 | Status: SHIPPED | OUTPATIENT
Start: 2023-01-01

## 2023-01-01 RX ORDER — HYDRALAZINE HYDROCHLORIDE 50 MG/1
50 TABLET, FILM COATED ORAL EVERY 6 HOURS SCHEDULED
Status: DISCONTINUED | OUTPATIENT
Start: 2023-01-01 | End: 2023-01-01 | Stop reason: HOSPADM

## 2023-01-01 RX ORDER — HYDROMORPHONE HYDROCHLORIDE 2 MG/1
2-4 TABLET ORAL
Qty: 25 TABLET | Refills: 0 | Status: SHIPPED | OUTPATIENT
Start: 2023-01-01 | End: 2023-01-01

## 2023-01-01 RX ORDER — NALOXONE HYDROCHLORIDE 0.4 MG/ML
0.4 INJECTION, SOLUTION INTRAMUSCULAR; INTRAVENOUS; SUBCUTANEOUS
Status: DISCONTINUED | OUTPATIENT
Start: 2023-01-01 | End: 2023-01-01 | Stop reason: HOSPADM

## 2023-01-01 RX ORDER — POLYETHYLENE GLYCOL 3350 17 G/17G
17 POWDER, FOR SOLUTION ORAL 2 TIMES DAILY PRN
Status: DISCONTINUED | OUTPATIENT
Start: 2023-01-01 | End: 2023-01-01

## 2023-01-01 RX ORDER — ACETAMINOPHEN 325 MG/1
975 TABLET ORAL EVERY 8 HOURS
Status: DISCONTINUED | OUTPATIENT
Start: 2023-01-01 | End: 2023-01-01

## 2023-01-01 RX ORDER — AMOXICILLIN 250 MG
1 CAPSULE ORAL 2 TIMES DAILY
Status: DISCONTINUED | OUTPATIENT
Start: 2023-01-01 | End: 2023-01-01 | Stop reason: HOSPADM

## 2023-01-01 RX ORDER — MULTIVITAMIN WITH IRON
1 TABLET ORAL DAILY
Status: ON HOLD | COMMUNITY
End: 2023-01-01

## 2023-01-01 RX ORDER — MAGNESIUM HYDROXIDE/ALUMINUM HYDROXICE/SIMETHICONE 120; 1200; 1200 MG/30ML; MG/30ML; MG/30ML
15 SUSPENSION ORAL ONCE
Status: COMPLETED | OUTPATIENT
Start: 2023-01-01 | End: 2023-01-01

## 2023-01-01 RX ORDER — ACETAMINOPHEN 325 MG/1
650 TABLET ORAL EVERY 6 HOURS PRN
Status: DISCONTINUED | OUTPATIENT
Start: 2023-01-01 | End: 2023-01-01 | Stop reason: HOSPADM

## 2023-01-01 RX ORDER — ONDANSETRON 4 MG/1
4 TABLET, ORALLY DISINTEGRATING ORAL EVERY 6 HOURS PRN
Status: DISCONTINUED | OUTPATIENT
Start: 2023-01-01 | End: 2023-01-01

## 2023-01-01 RX ORDER — ALBUTEROL SULFATE 0.83 MG/ML
2.5 SOLUTION RESPIRATORY (INHALATION) EVERY 4 HOURS PRN
Status: DISCONTINUED | OUTPATIENT
Start: 2023-01-01 | End: 2023-01-01

## 2023-01-01 RX ORDER — POLYETHYLENE GLYCOL 3350 17 G/17G
17 POWDER, FOR SOLUTION ORAL 2 TIMES DAILY
Status: DISCONTINUED | OUTPATIENT
Start: 2023-01-01 | End: 2023-01-01 | Stop reason: HOSPADM

## 2023-01-01 RX ORDER — SUCRALFATE ORAL 1 G/10ML
1 SUSPENSION ORAL
Qty: 414 ML | Refills: 0 | Status: SHIPPED | OUTPATIENT
Start: 2023-01-01

## 2023-01-01 RX ORDER — OXYCODONE AND ACETAMINOPHEN 5; 325 MG/1; MG/1
1-2 TABLET ORAL EVERY 4 HOURS PRN
Status: DISCONTINUED | OUTPATIENT
Start: 2023-01-01 | End: 2023-01-01

## 2023-01-01 RX ORDER — FUROSEMIDE 10 MG/ML
40 INJECTION INTRAMUSCULAR; INTRAVENOUS EVERY 12 HOURS
Status: DISCONTINUED | OUTPATIENT
Start: 2023-01-01 | End: 2023-01-01

## 2023-01-01 RX ORDER — LIDOCAINE 40 MG/G
CREAM TOPICAL
Status: DISCONTINUED | OUTPATIENT
Start: 2023-01-01 | End: 2023-01-01 | Stop reason: HOSPADM

## 2023-01-01 RX ORDER — HYDROMORPHONE HYDROCHLORIDE 2 MG/1
2-4 TABLET ORAL
Status: DISCONTINUED | OUTPATIENT
Start: 2023-01-01 | End: 2023-01-01 | Stop reason: HOSPADM

## 2023-01-01 RX ORDER — HYDROMORPHONE HYDROCHLORIDE 1 MG/ML
0.5 INJECTION, SOLUTION INTRAMUSCULAR; INTRAVENOUS; SUBCUTANEOUS EVERY 6 HOURS PRN
Status: DISCONTINUED | OUTPATIENT
Start: 2023-01-01 | End: 2023-01-01

## 2023-01-01 RX ORDER — SODIUM CHLORIDE 9 MG/ML
INJECTION, SOLUTION INTRAVENOUS CONTINUOUS
Status: DISCONTINUED | OUTPATIENT
Start: 2023-01-01 | End: 2023-01-01

## 2023-01-01 RX ORDER — HYDROMORPHONE HCL IN WATER/PF 6 MG/30 ML
0.2 PATIENT CONTROLLED ANALGESIA SYRINGE INTRAVENOUS ONCE
Status: DISCONTINUED | OUTPATIENT
Start: 2023-01-01 | End: 2023-01-01

## 2023-01-01 RX ORDER — SODIUM CHLORIDE 9 MG/ML
INJECTION, SOLUTION INTRAVENOUS CONTINUOUS
Status: ACTIVE | OUTPATIENT
Start: 2023-01-01 | End: 2023-01-01

## 2023-01-01 RX ORDER — AMLODIPINE BESYLATE 5 MG/1
5 TABLET ORAL DAILY
Status: DISCONTINUED | OUTPATIENT
Start: 2023-01-01 | End: 2023-01-01

## 2023-01-01 RX ORDER — FUROSEMIDE 20 MG
20 TABLET ORAL DAILY
Status: DISCONTINUED | OUTPATIENT
Start: 2023-01-01 | End: 2023-01-01 | Stop reason: HOSPADM

## 2023-01-01 RX ORDER — HYDROMORPHONE HYDROCHLORIDE 1 MG/ML
0.3 INJECTION, SOLUTION INTRAMUSCULAR; INTRAVENOUS; SUBCUTANEOUS
Status: DISCONTINUED | OUTPATIENT
Start: 2023-01-01 | End: 2023-01-01

## 2023-01-01 RX ORDER — POLYETHYLENE GLYCOL 3350 17 G/17G
17 POWDER, FOR SOLUTION ORAL DAILY
Qty: 510 G | Refills: 0 | Status: SHIPPED | OUTPATIENT
Start: 2023-01-01

## 2023-01-01 RX ORDER — ALBUTEROL SULFATE 90 UG/1
2 AEROSOL, METERED RESPIRATORY (INHALATION) EVERY 6 HOURS PRN
Status: DISCONTINUED | OUTPATIENT
Start: 2023-01-01 | End: 2023-01-01 | Stop reason: HOSPADM

## 2023-01-01 RX ORDER — HYDROMORPHONE HYDROCHLORIDE 1 MG/ML
0.5 INJECTION, SOLUTION INTRAMUSCULAR; INTRAVENOUS; SUBCUTANEOUS
Status: DISCONTINUED | OUTPATIENT
Start: 2023-01-01 | End: 2023-01-01

## 2023-01-01 RX ORDER — HYDRALAZINE HYDROCHLORIDE 20 MG/ML
10 INJECTION INTRAMUSCULAR; INTRAVENOUS EVERY 6 HOURS PRN
Status: DISCONTINUED | OUTPATIENT
Start: 2023-01-01 | End: 2023-01-01

## 2023-01-01 RX ORDER — ATENOLOL 25 MG/1
25 TABLET ORAL EVERY MORNING
Status: DISCONTINUED | OUTPATIENT
Start: 2023-01-01 | End: 2023-01-01 | Stop reason: HOSPADM

## 2023-01-01 RX ORDER — IPRATROPIUM BROMIDE AND ALBUTEROL SULFATE 2.5; .5 MG/3ML; MG/3ML
3 SOLUTION RESPIRATORY (INHALATION) EVERY 4 HOURS PRN
Qty: 90 ML | Refills: 0 | Status: SHIPPED | OUTPATIENT
Start: 2023-01-01

## 2023-01-01 RX ORDER — HYDRALAZINE HYDROCHLORIDE 25 MG/1
50 TABLET, FILM COATED ORAL EVERY 8 HOURS SCHEDULED
Status: DISCONTINUED | OUTPATIENT
Start: 2023-01-01 | End: 2023-01-01

## 2023-01-01 RX ORDER — NALOXONE HYDROCHLORIDE 0.4 MG/ML
0.2 INJECTION, SOLUTION INTRAMUSCULAR; INTRAVENOUS; SUBCUTANEOUS
Status: DISCONTINUED | OUTPATIENT
Start: 2023-01-01 | End: 2023-01-01

## 2023-01-01 RX ORDER — LORAZEPAM 0.5 MG/1
0.5 TABLET ORAL EVERY 8 HOURS PRN
Status: DISCONTINUED | OUTPATIENT
Start: 2023-01-01 | End: 2023-01-01

## 2023-01-01 RX ORDER — METHYLPREDNISOLONE SODIUM SUCCINATE 40 MG/ML
40 INJECTION, POWDER, LYOPHILIZED, FOR SOLUTION INTRAMUSCULAR; INTRAVENOUS EVERY 8 HOURS
Status: DISCONTINUED | OUTPATIENT
Start: 2023-01-01 | End: 2023-01-01

## 2023-01-01 RX ORDER — LISINOPRIL 20 MG/1
20 TABLET ORAL EVERY MORNING
Status: DISCONTINUED | OUTPATIENT
Start: 2023-01-01 | End: 2023-01-01

## 2023-01-01 RX ORDER — CEFTRIAXONE 2 G/1
2 INJECTION, POWDER, FOR SOLUTION INTRAMUSCULAR; INTRAVENOUS EVERY 24 HOURS
Status: DISCONTINUED | OUTPATIENT
Start: 2023-01-01 | End: 2023-01-01

## 2023-01-01 RX ORDER — NALOXONE HYDROCHLORIDE 0.4 MG/ML
0.4 INJECTION, SOLUTION INTRAMUSCULAR; INTRAVENOUS; SUBCUTANEOUS
Status: DISCONTINUED | OUTPATIENT
Start: 2023-01-01 | End: 2023-01-01

## 2023-01-01 RX ORDER — ONDANSETRON 4 MG/1
4 TABLET, ORALLY DISINTEGRATING ORAL EVERY 6 HOURS PRN
Status: DISCONTINUED | OUTPATIENT
Start: 2023-01-01 | End: 2023-01-01 | Stop reason: HOSPADM

## 2023-01-01 RX ORDER — SIMVASTATIN 20 MG
20 TABLET ORAL EVERY EVENING
Status: DISCONTINUED | OUTPATIENT
Start: 2023-01-01 | End: 2023-01-01

## 2023-01-01 RX ORDER — HYDROCHLOROTHIAZIDE 25 MG/1
25 TABLET ORAL DAILY
Status: DISCONTINUED | OUTPATIENT
Start: 2023-01-01 | End: 2023-01-01

## 2023-01-01 RX ORDER — ONDANSETRON 4 MG/1
4 TABLET, ORALLY DISINTEGRATING ORAL EVERY 6 HOURS PRN
Qty: 20 TABLET | Refills: 0 | Status: SHIPPED | OUTPATIENT
Start: 2023-01-01

## 2023-01-01 RX ORDER — ACETAMINOPHEN 650 MG/1
650 SUPPOSITORY RECTAL EVERY 6 HOURS PRN
Status: DISCONTINUED | OUTPATIENT
Start: 2023-01-01 | End: 2023-01-01 | Stop reason: HOSPADM

## 2023-01-01 RX ORDER — ONDANSETRON 2 MG/ML
4 INJECTION INTRAMUSCULAR; INTRAVENOUS EVERY 6 HOURS PRN
Status: DISCONTINUED | OUTPATIENT
Start: 2023-01-01 | End: 2023-01-01

## 2023-01-01 RX ORDER — LIDOCAINE 4 G/G
1-3 PATCH TOPICAL
Status: DISCONTINUED | OUTPATIENT
Start: 2023-01-01 | End: 2023-01-01 | Stop reason: HOSPADM

## 2023-01-01 RX ORDER — GABAPENTIN 100 MG/1
100 CAPSULE ORAL 2 TIMES DAILY PRN
Status: DISCONTINUED | OUTPATIENT
Start: 2023-01-01 | End: 2023-01-01

## 2023-01-01 RX ORDER — LIDOCAINE 50 MG/G
OINTMENT TOPICAL EVERY 4 HOURS PRN
Status: DISCONTINUED | OUTPATIENT
Start: 2023-01-01 | End: 2023-01-01 | Stop reason: HOSPADM

## 2023-01-01 RX ORDER — HYDROMORPHONE HCL IN WATER/PF 6 MG/30 ML
.2-.4 PATIENT CONTROLLED ANALGESIA SYRINGE INTRAVENOUS
Status: DISCONTINUED | OUTPATIENT
Start: 2023-01-01 | End: 2023-01-01

## 2023-01-01 RX ORDER — HYDRALAZINE HYDROCHLORIDE 25 MG/1
25 TABLET, FILM COATED ORAL EVERY 8 HOURS SCHEDULED
Status: DISCONTINUED | OUTPATIENT
Start: 2023-01-01 | End: 2023-01-01

## 2023-01-01 RX ORDER — HYDRALAZINE HYDROCHLORIDE 25 MG/1
25 TABLET, FILM COATED ORAL ONCE
Status: COMPLETED | OUTPATIENT
Start: 2023-01-01 | End: 2023-01-01

## 2023-01-01 RX ORDER — FLUMAZENIL 0.1 MG/ML
0.2 INJECTION, SOLUTION INTRAVENOUS
Status: DISCONTINUED | OUTPATIENT
Start: 2023-01-01 | End: 2023-01-01

## 2023-01-01 RX ORDER — PREDNISONE 20 MG/1
20 TABLET ORAL DAILY
Status: COMPLETED | OUTPATIENT
Start: 2023-01-01 | End: 2023-01-01

## 2023-01-01 RX ORDER — PROCHLORPERAZINE MALEATE 5 MG
5 TABLET ORAL EVERY 6 HOURS PRN
Status: DISCONTINUED | OUTPATIENT
Start: 2023-01-01 | End: 2023-01-01 | Stop reason: HOSPADM

## 2023-01-01 RX ORDER — HYDRALAZINE HYDROCHLORIDE 25 MG/1
50 TABLET, FILM COATED ORAL 4 TIMES DAILY
Status: DISCONTINUED | OUTPATIENT
Start: 2023-01-01 | End: 2023-01-01

## 2023-01-01 RX ORDER — FERROUS SULFATE 325(65) MG
325 TABLET ORAL
Status: ON HOLD | COMMUNITY
End: 2023-01-01

## 2023-01-01 RX ORDER — FUROSEMIDE 40 MG
40 TABLET ORAL DAILY
Status: DISCONTINUED | OUTPATIENT
Start: 2023-01-01 | End: 2023-01-01

## 2023-01-01 RX ORDER — BISACODYL 10 MG
10 SUPPOSITORY, RECTAL RECTAL DAILY PRN
Status: DISCONTINUED | OUTPATIENT
Start: 2023-01-01 | End: 2023-01-01 | Stop reason: HOSPADM

## 2023-01-01 RX ORDER — AMOXICILLIN 250 MG
1 CAPSULE ORAL 2 TIMES DAILY PRN
Qty: 20 TABLET | Refills: 0 | Status: SHIPPED | OUTPATIENT
Start: 2023-01-01

## 2023-01-01 RX ORDER — LEVALBUTEROL INHALATION SOLUTION 1.25 MG/3ML
1.25 SOLUTION RESPIRATORY (INHALATION) EVERY 4 HOURS PRN
Status: DISCONTINUED | OUTPATIENT
Start: 2023-01-01 | End: 2023-01-01 | Stop reason: HOSPADM

## 2023-01-01 RX ORDER — HYDROMORPHONE HYDROCHLORIDE 2 MG/1
2-4 TABLET ORAL
Qty: 25 TABLET | Refills: 0 | Status: SHIPPED | OUTPATIENT
Start: 2023-01-01

## 2023-01-01 RX ORDER — PANTOPRAZOLE SODIUM 40 MG/1
40 TABLET, DELAYED RELEASE ORAL
Status: DISCONTINUED | OUTPATIENT
Start: 2023-01-01 | End: 2023-01-01

## 2023-01-01 RX ORDER — AMLODIPINE BESYLATE 5 MG/1
5 TABLET ORAL ONCE
Status: COMPLETED | OUTPATIENT
Start: 2023-01-01 | End: 2023-01-01

## 2023-01-01 RX ORDER — AMOXICILLIN 250 MG
2 CAPSULE ORAL 2 TIMES DAILY
Status: CANCELLED | OUTPATIENT
Start: 2023-01-01

## 2023-01-01 RX ORDER — PROCHLORPERAZINE 25 MG
12.5 SUPPOSITORY, RECTAL RECTAL EVERY 12 HOURS PRN
Status: DISCONTINUED | OUTPATIENT
Start: 2023-01-01 | End: 2023-01-01 | Stop reason: HOSPADM

## 2023-01-01 RX ORDER — HYDROMORPHONE HCL IN WATER/PF 6 MG/30 ML
0.4 PATIENT CONTROLLED ANALGESIA SYRINGE INTRAVENOUS
Status: DISCONTINUED | OUTPATIENT
Start: 2023-01-01 | End: 2023-01-01

## 2023-01-01 RX ORDER — ONDANSETRON 4 MG/1
4 TABLET, ORALLY DISINTEGRATING ORAL EVERY 6 HOURS PRN
Qty: 20 TABLET | Refills: 0 | Status: SHIPPED | OUTPATIENT
Start: 2023-01-01 | End: 2023-01-01

## 2023-01-01 RX ORDER — HYDROMORPHONE HYDROCHLORIDE 4 MG/1
4 TABLET ORAL EVERY 4 HOURS PRN
Status: DISCONTINUED | OUTPATIENT
Start: 2023-01-01 | End: 2023-01-01

## 2023-01-01 RX ORDER — PREDNISONE 20 MG/1
40 TABLET ORAL DAILY
Status: DISCONTINUED | OUTPATIENT
Start: 2023-01-01 | End: 2023-01-01

## 2023-01-01 RX ORDER — GABAPENTIN 300 MG/1
300 CAPSULE ORAL 2 TIMES DAILY PRN
Status: DISCONTINUED | OUTPATIENT
Start: 2023-01-01 | End: 2023-01-01

## 2023-01-01 RX ORDER — IPRATROPIUM BROMIDE AND ALBUTEROL SULFATE 2.5; .5 MG/3ML; MG/3ML
3 SOLUTION RESPIRATORY (INHALATION) EVERY 4 HOURS PRN
Status: DISCONTINUED | OUTPATIENT
Start: 2023-01-01 | End: 2023-01-01 | Stop reason: HOSPADM

## 2023-01-01 RX ORDER — PANTOPRAZOLE SODIUM 40 MG/1
40 TABLET, DELAYED RELEASE ORAL
Status: DISCONTINUED | OUTPATIENT
Start: 2023-01-01 | End: 2023-01-01 | Stop reason: HOSPADM

## 2023-01-01 RX ORDER — ONDANSETRON 2 MG/ML
4 INJECTION INTRAMUSCULAR; INTRAVENOUS EVERY 6 HOURS PRN
Status: DISCONTINUED | OUTPATIENT
Start: 2023-01-01 | End: 2023-01-01 | Stop reason: HOSPADM

## 2023-01-01 RX ORDER — SUCRALFATE ORAL 1 G/10ML
1 SUSPENSION ORAL
Status: DISCONTINUED | OUTPATIENT
Start: 2023-01-01 | End: 2023-01-01 | Stop reason: HOSPADM

## 2023-01-01 RX ORDER — FENTANYL CITRATE 50 UG/ML
25-50 INJECTION, SOLUTION INTRAMUSCULAR; INTRAVENOUS EVERY 5 MIN PRN
Status: DISCONTINUED | OUTPATIENT
Start: 2023-01-01 | End: 2023-01-01

## 2023-01-01 RX ORDER — FUROSEMIDE 10 MG/ML
20 INJECTION INTRAMUSCULAR; INTRAVENOUS ONCE
Status: COMPLETED | OUTPATIENT
Start: 2023-01-01 | End: 2023-01-01

## 2023-01-01 RX ORDER — HYDRALAZINE HYDROCHLORIDE 50 MG/1
50 TABLET, FILM COATED ORAL EVERY 8 HOURS SCHEDULED
Status: DISCONTINUED | OUTPATIENT
Start: 2023-01-01 | End: 2023-01-01

## 2023-01-01 RX ORDER — OXYCODONE AND ACETAMINOPHEN 5; 325 MG/1; MG/1
1 TABLET ORAL ONCE
Status: COMPLETED | OUTPATIENT
Start: 2023-01-01 | End: 2023-01-01

## 2023-01-01 RX ORDER — AMLODIPINE BESYLATE 10 MG/1
10 TABLET ORAL DAILY
Status: DISCONTINUED | OUTPATIENT
Start: 2023-01-01 | End: 2023-01-01 | Stop reason: HOSPADM

## 2023-01-01 RX ORDER — AMLODIPINE BESYLATE 10 MG/1
10 TABLET ORAL DAILY
Status: DISCONTINUED | OUTPATIENT
Start: 2023-01-01 | End: 2023-01-01

## 2023-01-01 RX ADMIN — DICLOFENAC SODIUM 4 G: 10 GEL TOPICAL at 20:27

## 2023-01-01 RX ADMIN — HYDRALAZINE HYDROCHLORIDE 10 MG: 20 INJECTION INTRAMUSCULAR; INTRAVENOUS at 08:58

## 2023-01-01 RX ADMIN — HYDROMORPHONE HYDROCHLORIDE 0.3 MG: 1 INJECTION, SOLUTION INTRAMUSCULAR; INTRAVENOUS; SUBCUTANEOUS at 04:45

## 2023-01-01 RX ADMIN — HYDROMORPHONE HYDROCHLORIDE 4 MG: 4 TABLET ORAL at 15:44

## 2023-01-01 RX ADMIN — SUCRALFATE 1 G: 1 SUSPENSION ORAL at 06:34

## 2023-01-01 RX ADMIN — HYDROMORPHONE HYDROCHLORIDE 0.5 MG: 1 INJECTION, SOLUTION INTRAMUSCULAR; INTRAVENOUS; SUBCUTANEOUS at 12:36

## 2023-01-01 RX ADMIN — GABAPENTIN 100 MG: 100 CAPSULE ORAL at 17:27

## 2023-01-01 RX ADMIN — SENNOSIDES AND DOCUSATE SODIUM 1 TABLET: 50; 8.6 TABLET ORAL at 09:14

## 2023-01-01 RX ADMIN — FUROSEMIDE 20 MG: 20 TABLET ORAL at 09:37

## 2023-01-01 RX ADMIN — HYDROMORPHONE HYDROCHLORIDE 0.3 MG: 1 INJECTION, SOLUTION INTRAMUSCULAR; INTRAVENOUS; SUBCUTANEOUS at 22:49

## 2023-01-01 RX ADMIN — SENNOSIDES AND DOCUSATE SODIUM 1 TABLET: 50; 8.6 TABLET ORAL at 09:16

## 2023-01-01 RX ADMIN — NALOXONE HYDROCHLORIDE 0.2 MG: 0.4 INJECTION, SOLUTION INTRAMUSCULAR; INTRAVENOUS; SUBCUTANEOUS at 15:20

## 2023-01-01 RX ADMIN — HYDRALAZINE HYDROCHLORIDE 50 MG: 50 TABLET, FILM COATED ORAL at 14:36

## 2023-01-01 RX ADMIN — SENNOSIDES AND DOCUSATE SODIUM 1 TABLET: 50; 8.6 TABLET ORAL at 09:38

## 2023-01-01 RX ADMIN — HYDRALAZINE HYDROCHLORIDE 50 MG: 50 TABLET, FILM COATED ORAL at 21:16

## 2023-01-01 RX ADMIN — HYDRALAZINE HYDROCHLORIDE 50 MG: 50 TABLET, FILM COATED ORAL at 14:16

## 2023-01-01 RX ADMIN — GABAPENTIN 300 MG: 300 CAPSULE ORAL at 06:43

## 2023-01-01 RX ADMIN — FUROSEMIDE 40 MG: 10 INJECTION, SOLUTION INTRAVENOUS at 19:52

## 2023-01-01 RX ADMIN — HYDRALAZINE HYDROCHLORIDE 50 MG: 50 TABLET, FILM COATED ORAL at 20:00

## 2023-01-01 RX ADMIN — SODIUM ZIRCONIUM CYCLOSILICATE 10 G: 10 POWDER, FOR SUSPENSION ORAL at 14:25

## 2023-01-01 RX ADMIN — HYDROMORPHONE HYDROCHLORIDE 0.4 MG: 0.2 INJECTION, SOLUTION INTRAMUSCULAR; INTRAVENOUS; SUBCUTANEOUS at 10:36

## 2023-01-01 RX ADMIN — PANTOPRAZOLE SODIUM 40 MG: 40 TABLET, DELAYED RELEASE ORAL at 06:38

## 2023-01-01 RX ADMIN — SIMVASTATIN 20 MG: 20 TABLET, FILM COATED ORAL at 19:56

## 2023-01-01 RX ADMIN — HYDROMORPHONE HYDROCHLORIDE 0.4 MG: 0.2 INJECTION, SOLUTION INTRAMUSCULAR; INTRAVENOUS; SUBCUTANEOUS at 07:03

## 2023-01-01 RX ADMIN — HYDRALAZINE HYDROCHLORIDE 50 MG: 50 TABLET, FILM COATED ORAL at 18:08

## 2023-01-01 RX ADMIN — SUCRALFATE 1 G: 1 SUSPENSION ORAL at 12:02

## 2023-01-01 RX ADMIN — AMLODIPINE BESYLATE 10 MG: 10 TABLET ORAL at 09:12

## 2023-01-01 RX ADMIN — SIMVASTATIN 20 MG: 20 TABLET, FILM COATED ORAL at 21:58

## 2023-01-01 RX ADMIN — SODIUM CHLORIDE: 9 INJECTION, SOLUTION INTRAVENOUS at 19:56

## 2023-01-01 RX ADMIN — AMLODIPINE BESYLATE 5 MG: 5 TABLET ORAL at 09:13

## 2023-01-01 RX ADMIN — POLYETHYLENE GLYCOL 3350 17 G: 17 POWDER, FOR SOLUTION ORAL at 09:51

## 2023-01-01 RX ADMIN — HYDROMORPHONE HYDROCHLORIDE 4 MG: 4 TABLET ORAL at 09:14

## 2023-01-01 RX ADMIN — HYDROMORPHONE HYDROCHLORIDE 4 MG: 2 TABLET ORAL at 20:39

## 2023-01-01 RX ADMIN — HYDROMORPHONE HYDROCHLORIDE 0.4 MG: 0.2 INJECTION, SOLUTION INTRAMUSCULAR; INTRAVENOUS; SUBCUTANEOUS at 00:04

## 2023-01-01 RX ADMIN — LIDOCAINE 2 PATCH: 246 PATCH TOPICAL at 09:14

## 2023-01-01 RX ADMIN — KIT FOR THE PREPARATION OF TECHNETIUM TC 99M ALBUMIN AGGREGATED 8.8 MILLICURIE: 2.5 INJECTION, POWDER, FOR SOLUTION INTRAVENOUS at 11:09

## 2023-01-01 RX ADMIN — ATENOLOL 25 MG: 25 TABLET ORAL at 09:30

## 2023-01-01 RX ADMIN — AMLODIPINE BESYLATE 5 MG: 5 TABLET ORAL at 10:18

## 2023-01-01 RX ADMIN — AMLODIPINE BESYLATE 5 MG: 5 TABLET ORAL at 08:48

## 2023-01-01 RX ADMIN — HYDRALAZINE HYDROCHLORIDE 10 MG: 20 INJECTION INTRAMUSCULAR; INTRAVENOUS at 23:59

## 2023-01-01 RX ADMIN — OXYCODONE HYDROCHLORIDE AND ACETAMINOPHEN 1 TABLET: 5; 325 TABLET ORAL at 16:43

## 2023-01-01 RX ADMIN — SENNOSIDES AND DOCUSATE SODIUM 1 TABLET: 50; 8.6 TABLET ORAL at 20:26

## 2023-01-01 RX ADMIN — PANTOPRAZOLE SODIUM 40 MG: 40 TABLET, DELAYED RELEASE ORAL at 09:11

## 2023-01-01 RX ADMIN — SODIUM CHLORIDE: 9 INJECTION, SOLUTION INTRAVENOUS at 10:04

## 2023-01-01 RX ADMIN — HYDROMORPHONE HYDROCHLORIDE 2 MG: 2 TABLET ORAL at 10:04

## 2023-01-01 RX ADMIN — ALBUTEROL SULFATE 2 PUFF: 90 AEROSOL, METERED RESPIRATORY (INHALATION) at 08:48

## 2023-01-01 RX ADMIN — METHYLPREDNISOLONE SODIUM SUCCINATE 40 MG: 40 INJECTION, POWDER, FOR SOLUTION INTRAMUSCULAR; INTRAVENOUS at 09:48

## 2023-01-01 RX ADMIN — HYDROMORPHONE HYDROCHLORIDE 2 MG: 2 TABLET ORAL at 18:20

## 2023-01-01 RX ADMIN — DICLOFENAC SODIUM 4 G: 10 GEL TOPICAL at 09:04

## 2023-01-01 RX ADMIN — HYDRALAZINE HYDROCHLORIDE 50 MG: 50 TABLET, FILM COATED ORAL at 08:47

## 2023-01-01 RX ADMIN — SUCRALFATE 1 G: 1 SUSPENSION ORAL at 20:05

## 2023-01-01 RX ADMIN — ATENOLOL 25 MG: 25 TABLET ORAL at 09:37

## 2023-01-01 RX ADMIN — POLYETHYLENE GLYCOL 3350 17 G: 17 POWDER, FOR SOLUTION ORAL at 20:00

## 2023-01-01 RX ADMIN — HYDROMORPHONE HYDROCHLORIDE 0.4 MG: 0.2 INJECTION, SOLUTION INTRAMUSCULAR; INTRAVENOUS; SUBCUTANEOUS at 20:30

## 2023-01-01 RX ADMIN — POLYETHYLENE GLYCOL 3350 17 G: 17 POWDER, FOR SOLUTION ORAL at 20:36

## 2023-01-01 RX ADMIN — SODIUM CHLORIDE: 9 INJECTION, SOLUTION INTRAVENOUS at 17:52

## 2023-01-01 RX ADMIN — HYDROMORPHONE HYDROCHLORIDE 1 MG: 2 TABLET ORAL at 06:40

## 2023-01-01 RX ADMIN — HYDRALAZINE HYDROCHLORIDE 50 MG: 50 TABLET, FILM COATED ORAL at 00:58

## 2023-01-01 RX ADMIN — AMLODIPINE BESYLATE 5 MG: 5 TABLET ORAL at 08:47

## 2023-01-01 RX ADMIN — HYDROMORPHONE HYDROCHLORIDE 0.5 MG: 1 INJECTION, SOLUTION INTRAMUSCULAR; INTRAVENOUS; SUBCUTANEOUS at 12:31

## 2023-01-01 RX ADMIN — SIMVASTATIN 20 MG: 20 TABLET, FILM COATED ORAL at 20:37

## 2023-01-01 RX ADMIN — DICLOFENAC SODIUM 4 G: 10 GEL TOPICAL at 18:12

## 2023-01-01 RX ADMIN — HYDROMORPHONE HYDROCHLORIDE 2 MG: 2 TABLET ORAL at 19:25

## 2023-01-01 RX ADMIN — HYDROMORPHONE HYDROCHLORIDE 4 MG: 2 TABLET ORAL at 05:22

## 2023-01-01 RX ADMIN — SIMVASTATIN 20 MG: 20 TABLET, FILM COATED ORAL at 21:32

## 2023-01-01 RX ADMIN — HYDROMORPHONE HYDROCHLORIDE 2 MG: 2 TABLET ORAL at 22:32

## 2023-01-01 RX ADMIN — SENNOSIDES AND DOCUSATE SODIUM 1 TABLET: 50; 8.6 TABLET ORAL at 20:00

## 2023-01-01 RX ADMIN — SODIUM CHLORIDE: 9 INJECTION, SOLUTION INTRAVENOUS at 14:07

## 2023-01-01 RX ADMIN — SENNOSIDES AND DOCUSATE SODIUM 1 TABLET: 50; 8.6 TABLET ORAL at 20:37

## 2023-01-01 RX ADMIN — SENNOSIDES AND DOCUSATE SODIUM 1 TABLET: 50; 8.6 TABLET ORAL at 09:13

## 2023-01-01 RX ADMIN — HYDRALAZINE HYDROCHLORIDE 50 MG: 50 TABLET, FILM COATED ORAL at 05:50

## 2023-01-01 RX ADMIN — HYDRALAZINE HYDROCHLORIDE 25 MG: 25 TABLET, FILM COATED ORAL at 17:39

## 2023-01-01 RX ADMIN — POLYETHYLENE GLYCOL 3350 17 G: 17 POWDER, FOR SOLUTION ORAL at 21:16

## 2023-01-01 RX ADMIN — SUCRALFATE 1 G: 1 SUSPENSION ORAL at 15:40

## 2023-01-01 RX ADMIN — ATENOLOL 25 MG: 25 TABLET ORAL at 08:49

## 2023-01-01 RX ADMIN — POLYETHYLENE GLYCOL 3350 17 G: 17 POWDER, FOR SOLUTION ORAL at 09:15

## 2023-01-01 RX ADMIN — ALUMINUM HYDROXIDE, MAGNESIUM HYDROXIDE, AND DIMETHICONE 15 ML: 200; 20; 200 SUSPENSION ORAL at 12:34

## 2023-01-01 RX ADMIN — LIDOCAINE 3 PATCH: 246 PATCH TOPICAL at 10:18

## 2023-01-01 RX ADMIN — IRON SUCROSE 200 MG: 20 INJECTION, SOLUTION INTRAVENOUS at 12:04

## 2023-01-01 RX ADMIN — POLYETHYLENE GLYCOL 3350 17 G: 17 POWDER, FOR SOLUTION ORAL at 09:37

## 2023-01-01 RX ADMIN — LIDOCAINE HYDROCHLORIDE 10 ML: 10 INJECTION, SOLUTION EPIDURAL; INFILTRATION; INTRACAUDAL; PERINEURAL at 08:59

## 2023-01-01 RX ADMIN — OXYCODONE AND ACETAMINOPHEN 1 TABLET: 5; 325 TABLET ORAL at 03:45

## 2023-01-01 RX ADMIN — FUROSEMIDE 40 MG: 10 INJECTION, SOLUTION INTRAVENOUS at 09:48

## 2023-01-01 RX ADMIN — BISACODYL 10 MG: 10 SUPPOSITORY RECTAL at 17:59

## 2023-01-01 RX ADMIN — SUCRALFATE 1 G: 1 SUSPENSION ORAL at 20:36

## 2023-01-01 RX ADMIN — PANTOPRAZOLE SODIUM 40 MG: 40 TABLET, DELAYED RELEASE ORAL at 06:34

## 2023-01-01 RX ADMIN — ALBUTEROL SULFATE 2 PUFF: 90 AEROSOL, METERED RESPIRATORY (INHALATION) at 14:32

## 2023-01-01 RX ADMIN — ATENOLOL 25 MG: 25 TABLET ORAL at 08:47

## 2023-01-01 RX ADMIN — HYDROMORPHONE HYDROCHLORIDE 0.4 MG: 0.2 INJECTION, SOLUTION INTRAMUSCULAR; INTRAVENOUS; SUBCUTANEOUS at 17:08

## 2023-01-01 RX ADMIN — HYDROMORPHONE HYDROCHLORIDE 1 MG: 2 TABLET ORAL at 19:55

## 2023-01-01 RX ADMIN — HYDROMORPHONE HYDROCHLORIDE 2 MG: 2 TABLET ORAL at 05:04

## 2023-01-01 RX ADMIN — HYDROMORPHONE HYDROCHLORIDE 1 MG: 2 TABLET ORAL at 20:04

## 2023-01-01 RX ADMIN — HYDRALAZINE HYDROCHLORIDE 10 MG: 20 INJECTION INTRAMUSCULAR; INTRAVENOUS at 01:02

## 2023-01-01 RX ADMIN — HYDRALAZINE HYDROCHLORIDE 50 MG: 50 TABLET, FILM COATED ORAL at 22:28

## 2023-01-01 RX ADMIN — HYDRALAZINE HYDROCHLORIDE 50 MG: 50 TABLET, FILM COATED ORAL at 15:40

## 2023-01-01 RX ADMIN — SUCRALFATE 1 G: 1 SUSPENSION ORAL at 06:59

## 2023-01-01 RX ADMIN — SENNOSIDES AND DOCUSATE SODIUM 1 TABLET: 50; 8.6 TABLET ORAL at 08:47

## 2023-01-01 RX ADMIN — SODIUM CHLORIDE: 9 INJECTION, SOLUTION INTRAVENOUS at 00:28

## 2023-01-01 RX ADMIN — HYDROMORPHONE HYDROCHLORIDE 1 MG: 2 TABLET ORAL at 06:27

## 2023-01-01 RX ADMIN — OXYCODONE AND ACETAMINOPHEN 1 TABLET: 5; 325 TABLET ORAL at 05:04

## 2023-01-01 RX ADMIN — PANTOPRAZOLE SODIUM 40 MG: 40 TABLET, DELAYED RELEASE ORAL at 15:40

## 2023-01-01 RX ADMIN — POLYETHYLENE GLYCOL 3350 17 G: 17 POWDER, FOR SOLUTION ORAL at 09:22

## 2023-01-01 RX ADMIN — HYDROMORPHONE HYDROCHLORIDE 0.3 MG: 1 INJECTION, SOLUTION INTRAMUSCULAR; INTRAVENOUS; SUBCUTANEOUS at 13:16

## 2023-01-01 RX ADMIN — CEFTRIAXONE SODIUM 2 G: 2 INJECTION, POWDER, FOR SOLUTION INTRAMUSCULAR; INTRAVENOUS at 09:14

## 2023-01-01 RX ADMIN — HYDROMORPHONE HYDROCHLORIDE 4 MG: 4 TABLET ORAL at 11:32

## 2023-01-01 RX ADMIN — MAGNESIUM HYDROXIDE 30 ML: 400 SUSPENSION ORAL at 11:50

## 2023-01-01 RX ADMIN — HYDRALAZINE HYDROCHLORIDE 10 MG: 20 INJECTION INTRAMUSCULAR; INTRAVENOUS at 05:04

## 2023-01-01 RX ADMIN — METHYLPREDNISOLONE SODIUM SUCCINATE 40 MG: 40 INJECTION, POWDER, FOR SOLUTION INTRAMUSCULAR; INTRAVENOUS at 17:35

## 2023-01-01 RX ADMIN — SODIUM CHLORIDE 1000 ML: 9 INJECTION, SOLUTION INTRAVENOUS at 15:34

## 2023-01-01 RX ADMIN — POLYETHYLENE GLYCOL 3350 17 G: 17 POWDER, FOR SOLUTION ORAL at 20:05

## 2023-01-01 RX ADMIN — ATENOLOL 25 MG: 25 TABLET ORAL at 09:11

## 2023-01-01 RX ADMIN — IPRATROPIUM BROMIDE AND ALBUTEROL SULFATE 3 ML: .5; 3 SOLUTION RESPIRATORY (INHALATION) at 01:05

## 2023-01-01 RX ADMIN — HYDRALAZINE HYDROCHLORIDE 50 MG: 50 TABLET, FILM COATED ORAL at 05:22

## 2023-01-01 RX ADMIN — OXYCODONE AND ACETAMINOPHEN 1 TABLET: 5; 325 TABLET ORAL at 17:08

## 2023-01-01 RX ADMIN — GABAPENTIN 300 MG: 300 CAPSULE ORAL at 20:04

## 2023-01-01 RX ADMIN — SENNOSIDES AND DOCUSATE SODIUM 1 TABLET: 50; 8.6 TABLET ORAL at 09:37

## 2023-01-01 RX ADMIN — METHYLPREDNISOLONE SODIUM SUCCINATE 40 MG: 40 INJECTION, POWDER, FOR SOLUTION INTRAMUSCULAR; INTRAVENOUS at 02:11

## 2023-01-01 RX ADMIN — OXYCODONE AND ACETAMINOPHEN 1 TABLET: 5; 325 TABLET ORAL at 06:29

## 2023-01-01 RX ADMIN — HYDROMORPHONE HYDROCHLORIDE 0.5 MG: 1 INJECTION, SOLUTION INTRAMUSCULAR; INTRAVENOUS; SUBCUTANEOUS at 18:18

## 2023-01-01 RX ADMIN — PREDNISONE 40 MG: 20 TABLET ORAL at 15:35

## 2023-01-01 RX ADMIN — HYDRALAZINE HYDROCHLORIDE 50 MG: 50 TABLET, FILM COATED ORAL at 19:52

## 2023-01-01 RX ADMIN — PREDNISONE 20 MG: 20 TABLET ORAL at 09:37

## 2023-01-01 RX ADMIN — PREDNISONE 40 MG: 20 TABLET ORAL at 09:30

## 2023-01-01 RX ADMIN — SUCRALFATE 1 G: 1 SUSPENSION ORAL at 09:13

## 2023-01-01 RX ADMIN — ACETAMINOPHEN 975 MG: 325 TABLET ORAL at 00:55

## 2023-01-01 RX ADMIN — SUCRALFATE 1 G: 1 SUSPENSION ORAL at 17:19

## 2023-01-01 RX ADMIN — FUROSEMIDE 40 MG: 40 TABLET ORAL at 09:30

## 2023-01-01 RX ADMIN — HYDROMORPHONE HYDROCHLORIDE 1 MG: 2 TABLET ORAL at 00:05

## 2023-01-01 RX ADMIN — ATENOLOL 25 MG: 25 TABLET ORAL at 10:04

## 2023-01-01 RX ADMIN — SENNOSIDES AND DOCUSATE SODIUM 1 TABLET: 50; 8.6 TABLET ORAL at 08:48

## 2023-01-01 RX ADMIN — OXYCODONE AND ACETAMINOPHEN 1 TABLET: 5; 325 TABLET ORAL at 08:49

## 2023-01-01 RX ADMIN — HYDROMORPHONE HYDROCHLORIDE 0.5 MG: 1 INJECTION, SOLUTION INTRAMUSCULAR; INTRAVENOUS; SUBCUTANEOUS at 18:07

## 2023-01-01 RX ADMIN — PANTOPRAZOLE SODIUM 40 MG: 40 TABLET, DELAYED RELEASE ORAL at 06:27

## 2023-01-01 RX ADMIN — HYDROMORPHONE HYDROCHLORIDE 4 MG: 4 TABLET ORAL at 00:05

## 2023-01-01 RX ADMIN — ATENOLOL 25 MG: 25 TABLET ORAL at 09:23

## 2023-01-01 RX ADMIN — SUCRALFATE 1 G: 1 SUSPENSION ORAL at 17:59

## 2023-01-01 RX ADMIN — HYDROMORPHONE HYDROCHLORIDE 2 MG: 2 TABLET ORAL at 13:52

## 2023-01-01 RX ADMIN — OXYCODONE AND ACETAMINOPHEN 1 TABLET: 5; 325 TABLET ORAL at 20:26

## 2023-01-01 RX ADMIN — HYDROMORPHONE HYDROCHLORIDE 2 MG: 2 TABLET ORAL at 14:10

## 2023-01-01 RX ADMIN — OXYCODONE AND ACETAMINOPHEN 1 TABLET: 5; 325 TABLET ORAL at 21:58

## 2023-01-01 RX ADMIN — IRON SUCROSE 200 MG: 20 INJECTION, SOLUTION INTRAVENOUS at 14:25

## 2023-01-01 RX ADMIN — SIMVASTATIN 20 MG: 20 TABLET, FILM COATED ORAL at 19:53

## 2023-01-01 RX ADMIN — IRON SUCROSE 200 MG: 20 INJECTION, SOLUTION INTRAVENOUS at 14:36

## 2023-01-01 RX ADMIN — PANTOPRAZOLE SODIUM 40 MG: 40 TABLET, DELAYED RELEASE ORAL at 17:19

## 2023-01-01 RX ADMIN — AMLODIPINE BESYLATE 10 MG: 10 TABLET ORAL at 09:16

## 2023-01-01 RX ADMIN — HYDRALAZINE HYDROCHLORIDE 50 MG: 50 TABLET, FILM COATED ORAL at 06:26

## 2023-01-01 RX ADMIN — SIMVASTATIN 20 MG: 20 TABLET, FILM COATED ORAL at 22:17

## 2023-01-01 RX ADMIN — AMLODIPINE BESYLATE 10 MG: 10 TABLET ORAL at 09:37

## 2023-01-01 RX ADMIN — NALOXONE HYDROCHLORIDE 0.2 MG: 0.4 INJECTION, SOLUTION INTRAMUSCULAR; INTRAVENOUS; SUBCUTANEOUS at 15:54

## 2023-01-01 RX ADMIN — HYDROMORPHONE HYDROCHLORIDE 0.5 MG: 1 INJECTION, SOLUTION INTRAMUSCULAR; INTRAVENOUS; SUBCUTANEOUS at 09:22

## 2023-01-01 RX ADMIN — ALUMINUM HYDROXIDE, MAGNESIUM HYDROXIDE, AND DIMETHICONE 15 ML: 200; 20; 200 SUSPENSION ORAL at 11:49

## 2023-01-01 RX ADMIN — HYDROMORPHONE HYDROCHLORIDE 0.2 MG: 1 INJECTION, SOLUTION INTRAMUSCULAR; INTRAVENOUS; SUBCUTANEOUS at 00:49

## 2023-01-01 RX ADMIN — HYDROMORPHONE HYDROCHLORIDE 0.5 MG: 1 INJECTION, SOLUTION INTRAMUSCULAR; INTRAVENOUS; SUBCUTANEOUS at 01:23

## 2023-01-01 RX ADMIN — CEFTRIAXONE SODIUM 2 G: 2 INJECTION, POWDER, FOR SOLUTION INTRAMUSCULAR; INTRAVENOUS at 08:48

## 2023-01-01 RX ADMIN — HYDRALAZINE HYDROCHLORIDE 50 MG: 50 TABLET, FILM COATED ORAL at 00:36

## 2023-01-01 RX ADMIN — SODIUM ZIRCONIUM CYCLOSILICATE 10 G: 10 POWDER, FOR SUSPENSION ORAL at 21:16

## 2023-01-01 RX ADMIN — ATENOLOL 25 MG: 25 TABLET ORAL at 07:46

## 2023-01-01 RX ADMIN — FUROSEMIDE 40 MG: 10 INJECTION, SOLUTION INTRAVENOUS at 09:51

## 2023-01-01 RX ADMIN — HYDRALAZINE HYDROCHLORIDE 10 MG: 20 INJECTION INTRAMUSCULAR; INTRAVENOUS at 15:55

## 2023-01-01 RX ADMIN — CEFTRIAXONE SODIUM 2 G: 2 INJECTION, POWDER, FOR SOLUTION INTRAMUSCULAR; INTRAVENOUS at 09:37

## 2023-01-01 RX ADMIN — OXYCODONE HYDROCHLORIDE AND ACETAMINOPHEN 1 TABLET: 5; 325 TABLET ORAL at 19:53

## 2023-01-01 RX ADMIN — HYDRALAZINE HYDROCHLORIDE 50 MG: 25 TABLET, FILM COATED ORAL at 09:41

## 2023-01-01 RX ADMIN — HYDRALAZINE HYDROCHLORIDE 50 MG: 50 TABLET, FILM COATED ORAL at 06:11

## 2023-01-01 RX ADMIN — AMLODIPINE BESYLATE 5 MG: 5 TABLET ORAL at 09:23

## 2023-01-01 RX ADMIN — SUCRALFATE 1 G: 1 SUSPENSION ORAL at 11:45

## 2023-01-01 RX ADMIN — SENNOSIDES AND DOCUSATE SODIUM 1 TABLET: 50; 8.6 TABLET ORAL at 09:30

## 2023-01-01 RX ADMIN — ACETAMINOPHEN 975 MG: 325 TABLET ORAL at 09:12

## 2023-01-01 RX ADMIN — HYDRALAZINE HYDROCHLORIDE 50 MG: 50 TABLET, FILM COATED ORAL at 08:48

## 2023-01-01 RX ADMIN — MIDAZOLAM 1 MG: 1 INJECTION INTRAMUSCULAR; INTRAVENOUS at 08:53

## 2023-01-01 RX ADMIN — SUCRALFATE 1 G: 1 SUSPENSION ORAL at 09:00

## 2023-01-01 RX ADMIN — HYDRALAZINE HYDROCHLORIDE 25 MG: 25 TABLET, FILM COATED ORAL at 14:57

## 2023-01-01 RX ADMIN — GABAPENTIN 300 MG: 300 CAPSULE ORAL at 18:01

## 2023-01-01 RX ADMIN — ACETAMINOPHEN 975 MG: 325 TABLET ORAL at 15:40

## 2023-01-01 RX ADMIN — HYDROMORPHONE HYDROCHLORIDE 0.5 MG: 1 INJECTION, SOLUTION INTRAMUSCULAR; INTRAVENOUS; SUBCUTANEOUS at 02:12

## 2023-01-01 RX ADMIN — AMLODIPINE BESYLATE 10 MG: 10 TABLET ORAL at 09:30

## 2023-01-01 RX ADMIN — ATENOLOL 25 MG: 25 TABLET ORAL at 09:16

## 2023-01-01 RX ADMIN — ATENOLOL 25 MG: 25 TABLET ORAL at 09:12

## 2023-01-01 RX ADMIN — HYDRALAZINE HYDROCHLORIDE 10 MG: 20 INJECTION INTRAMUSCULAR; INTRAVENOUS at 15:46

## 2023-01-01 RX ADMIN — HYDRALAZINE HYDROCHLORIDE 50 MG: 50 TABLET, FILM COATED ORAL at 11:45

## 2023-01-01 RX ADMIN — HYDROMORPHONE HYDROCHLORIDE 4 MG: 4 TABLET ORAL at 23:57

## 2023-01-01 RX ADMIN — HYDROMORPHONE HYDROCHLORIDE 4 MG: 2 TABLET ORAL at 00:36

## 2023-01-01 RX ADMIN — AMLODIPINE BESYLATE 5 MG: 5 TABLET ORAL at 12:34

## 2023-01-01 RX ADMIN — SIMVASTATIN 20 MG: 20 TABLET, FILM COATED ORAL at 20:04

## 2023-01-01 RX ADMIN — DICLOFENAC SODIUM 4 G: 10 GEL TOPICAL at 21:58

## 2023-01-01 RX ADMIN — HYDRALAZINE HYDROCHLORIDE 50 MG: 50 TABLET, FILM COATED ORAL at 21:58

## 2023-01-01 RX ADMIN — ACETAMINOPHEN 975 MG: 325 TABLET ORAL at 08:47

## 2023-01-01 RX ADMIN — HYDRALAZINE HYDROCHLORIDE 10 MG: 20 INJECTION INTRAMUSCULAR; INTRAVENOUS at 00:21

## 2023-01-01 RX ADMIN — SUCRALFATE 1 G: 1 SUSPENSION ORAL at 11:50

## 2023-01-01 RX ADMIN — ALBUTEROL SULFATE 2 PUFF: 90 AEROSOL, METERED RESPIRATORY (INHALATION) at 12:30

## 2023-01-01 RX ADMIN — METHYLPREDNISOLONE SODIUM SUCCINATE 40 MG: 40 INJECTION, POWDER, FOR SOLUTION INTRAMUSCULAR; INTRAVENOUS at 09:51

## 2023-01-01 RX ADMIN — HYDRALAZINE HYDROCHLORIDE 10 MG: 20 INJECTION INTRAMUSCULAR; INTRAVENOUS at 22:02

## 2023-01-01 RX ADMIN — SUCRALFATE 1 G: 1 SUSPENSION ORAL at 21:16

## 2023-01-01 RX ADMIN — POLYETHYLENE GLYCOL 3350 17 G: 17 POWDER, FOR SOLUTION ORAL at 09:30

## 2023-01-01 RX ADMIN — SENNOSIDES AND DOCUSATE SODIUM 1 TABLET: 50; 8.6 TABLET ORAL at 20:05

## 2023-01-01 RX ADMIN — CEFTRIAXONE SODIUM 2 G: 2 INJECTION, POWDER, FOR SOLUTION INTRAMUSCULAR; INTRAVENOUS at 09:54

## 2023-01-01 RX ADMIN — CEFTRIAXONE SODIUM 2 G: 2 INJECTION, POWDER, FOR SOLUTION INTRAMUSCULAR; INTRAVENOUS at 09:43

## 2023-01-01 RX ADMIN — KIT FOR THE PREPARATION OF TECHNETIUM TC 99M PENTETATE 68.4 MILLICURIE: 20 INJECTION, POWDER, LYOPHILIZED, FOR SOLUTION INTRAVENOUS; RESPIRATORY (INHALATION) at 10:22

## 2023-01-01 RX ADMIN — HYDROMORPHONE HYDROCHLORIDE 1 MG: 2 TABLET ORAL at 06:38

## 2023-01-01 RX ADMIN — FUROSEMIDE 20 MG: 10 INJECTION, SOLUTION INTRAMUSCULAR; INTRAVENOUS at 00:47

## 2023-01-01 RX ADMIN — HYDRALAZINE HYDROCHLORIDE 10 MG: 20 INJECTION INTRAMUSCULAR; INTRAVENOUS at 14:28

## 2023-01-01 RX ADMIN — HYDRALAZINE HYDROCHLORIDE 50 MG: 50 TABLET, FILM COATED ORAL at 13:47

## 2023-01-01 RX ADMIN — SIMVASTATIN 20 MG: 20 TABLET, FILM COATED ORAL at 21:16

## 2023-01-01 RX ADMIN — HYDROMORPHONE HYDROCHLORIDE 1 MG: 2 TABLET ORAL at 02:33

## 2023-01-01 RX ADMIN — PANTOPRAZOLE SODIUM 40 MG: 40 TABLET, DELAYED RELEASE ORAL at 17:59

## 2023-01-01 RX ADMIN — FENTANYL CITRATE 50 MCG: 50 INJECTION, SOLUTION INTRAMUSCULAR; INTRAVENOUS at 08:54

## 2023-01-01 RX ADMIN — SUCRALFATE 1 G: 1 SUSPENSION ORAL at 06:38

## 2023-01-01 RX ADMIN — PANTOPRAZOLE SODIUM 40 MG: 40 TABLET, DELAYED RELEASE ORAL at 06:11

## 2023-01-01 RX ADMIN — POLYETHYLENE GLYCOL 3350 17 G: 17 POWDER, FOR SOLUTION ORAL at 08:47

## 2023-01-01 RX ADMIN — SENNOSIDES AND DOCUSATE SODIUM 1 TABLET: 50; 8.6 TABLET ORAL at 21:16

## 2023-01-01 RX ADMIN — SENNOSIDES AND DOCUSATE SODIUM 1 TABLET: 50; 8.6 TABLET ORAL at 22:01

## 2023-01-01 RX ADMIN — PANTOPRAZOLE SODIUM 40 MG: 40 TABLET, DELAYED RELEASE ORAL at 06:59

## 2023-01-01 RX ADMIN — HYDROMORPHONE HYDROCHLORIDE 0.5 MG: 1 INJECTION, SOLUTION INTRAMUSCULAR; INTRAVENOUS; SUBCUTANEOUS at 16:35

## 2023-01-01 RX ADMIN — OXYCODONE HYDROCHLORIDE AND ACETAMINOPHEN 1 TABLET: 5; 325 TABLET ORAL at 03:10

## 2023-01-01 RX ADMIN — SIMVASTATIN 20 MG: 20 TABLET, FILM COATED ORAL at 20:26

## 2023-01-01 ASSESSMENT — ACTIVITIES OF DAILY LIVING (ADL)
DOING_ERRANDS_INDEPENDENTLY_DIFFICULTY: YES
ADLS_ACUITY_SCORE: 33
ADLS_ACUITY_SCORE: 36
ADLS_ACUITY_SCORE: 30
ADLS_ACUITY_SCORE: 40
ADLS_ACUITY_SCORE: 34
ADLS_ACUITY_SCORE: 34
ADLS_ACUITY_SCORE: 38
ADLS_ACUITY_SCORE: 38
ADLS_ACUITY_SCORE: 34
ADLS_ACUITY_SCORE: 34
ADLS_ACUITY_SCORE: 36
ADLS_ACUITY_SCORE: 26
ADLS_ACUITY_SCORE: 33
ADLS_ACUITY_SCORE: 38
ADLS_ACUITY_SCORE: 36
ADLS_ACUITY_SCORE: 40
ADLS_ACUITY_SCORE: 34
ADLS_ACUITY_SCORE: 36
TOILETING_ISSUES: NO
ADLS_ACUITY_SCORE: 26
ADLS_ACUITY_SCORE: 35
DIFFICULTY_COMMUNICATING: NO
WALKING_OR_CLIMBING_STAIRS_DIFFICULTY: NO
ADLS_ACUITY_SCORE: 34
ADLS_ACUITY_SCORE: 40
DRESSING/BATHING_DIFFICULTY: NO
ADLS_ACUITY_SCORE: 34
ADLS_ACUITY_SCORE: 26
ADLS_ACUITY_SCORE: 36
ADLS_ACUITY_SCORE: 27
ADLS_ACUITY_SCORE: 34
PATIENT'S_PREFERRED_MEANS_OF_COMMUNICATION: ENGLISH SPEAKER WITH HEARING LOSS, NO SPEECH PROBLEMS.
ADLS_ACUITY_SCORE: 34
ADLS_ACUITY_SCORE: 26
ADLS_ACUITY_SCORE: 37
DESCRIBE_HEARING_LOSS: BILATERAL HEARING LOSS
ADLS_ACUITY_SCORE: 36
ADLS_ACUITY_SCORE: 30
ADLS_ACUITY_SCORE: 26
ADLS_ACUITY_SCORE: 26
NUMBER_OF_TIMES_PATIENT_HAS_FALLEN_WITHIN_LAST_SIX_MONTHS: 2
ADLS_ACUITY_SCORE: 35
ADLS_ACUITY_SCORE: 36
ADLS_ACUITY_SCORE: 30
ADLS_ACUITY_SCORE: 40
ADLS_ACUITY_SCORE: 34
ADLS_ACUITY_SCORE: 38
ADLS_ACUITY_SCORE: 36
ADLS_ACUITY_SCORE: 34
ADLS_ACUITY_SCORE: 27
ADLS_ACUITY_SCORE: 34
ADLS_ACUITY_SCORE: 34
ADLS_ACUITY_SCORE: 30
ADLS_ACUITY_SCORE: 36
ADLS_ACUITY_SCORE: 36
ADLS_ACUITY_SCORE: 40
DIFFICULTY_EATING/SWALLOWING: OTHER (SEE COMMENTS)
FALL_HISTORY_WITHIN_LAST_SIX_MONTHS: YES
DEPENDENT_IADLS:: TRANSPORTATION;INDEPENDENT
ADLS_ACUITY_SCORE: 30
ADLS_ACUITY_SCORE: 34
ADLS_ACUITY_SCORE: 34
ADLS_ACUITY_SCORE: 27
ADLS_ACUITY_SCORE: 36
ADLS_ACUITY_SCORE: 26
ADLS_ACUITY_SCORE: 36
ADLS_ACUITY_SCORE: 34
ADLS_ACUITY_SCORE: 34
ADLS_ACUITY_SCORE: 38
WEAR_GLASSES_OR_BLIND: YES
ADLS_ACUITY_SCORE: 34
ADLS_ACUITY_SCORE: 36
ADLS_ACUITY_SCORE: 38
CHANGE_IN_FUNCTIONAL_STATUS_SINCE_ONSET_OF_CURRENT_ILLNESS/INJURY: YES
ADLS_ACUITY_SCORE: 26
ADLS_ACUITY_SCORE: 30
ADLS_ACUITY_SCORE: 34
ADLS_ACUITY_SCORE: 38
ADLS_ACUITY_SCORE: 36
ADLS_ACUITY_SCORE: 30
ADLS_ACUITY_SCORE: 34
ADLS_ACUITY_SCORE: 36
ADLS_ACUITY_SCORE: 35
ADLS_ACUITY_SCORE: 26
ADLS_ACUITY_SCORE: 36
ADLS_ACUITY_SCORE: 34
ADLS_ACUITY_SCORE: 34
ADLS_ACUITY_SCORE: 36
CONCENTRATING,_REMEMBERING_OR_MAKING_DECISIONS_DIFFICULTY: NO
ADLS_ACUITY_SCORE: 36
ADLS_ACUITY_SCORE: 34
ADLS_ACUITY_SCORE: 36
ADLS_ACUITY_SCORE: 40
ADLS_ACUITY_SCORE: 34
ADLS_ACUITY_SCORE: 26
ADLS_ACUITY_SCORE: 40
ADLS_ACUITY_SCORE: 36
ADLS_ACUITY_SCORE: 34
ADLS_ACUITY_SCORE: 34
EQUIPMENT_CURRENTLY_USED_AT_HOME: WALKER, ROLLING
ADLS_ACUITY_SCORE: 34
ADLS_ACUITY_SCORE: 36
ADLS_ACUITY_SCORE: 34
HEARING_DIFFICULTY_OR_DEAF: NO
ADLS_ACUITY_SCORE: 36
ADLS_ACUITY_SCORE: 30
ADLS_ACUITY_SCORE: 36
ADLS_ACUITY_SCORE: 34
ADLS_ACUITY_SCORE: 34
VISION_MANAGEMENT: GLASSES
ADLS_ACUITY_SCORE: 34
ADLS_ACUITY_SCORE: 26
ADLS_ACUITY_SCORE: 33
ADLS_ACUITY_SCORE: 26
ADLS_ACUITY_SCORE: 33
ADLS_ACUITY_SCORE: 34
ADLS_ACUITY_SCORE: 38
ADLS_ACUITY_SCORE: 34
ADLS_ACUITY_SCORE: 34
ADLS_ACUITY_SCORE: 40
ADLS_ACUITY_SCORE: 34
ADLS_ACUITY_SCORE: 36
ADLS_ACUITY_SCORE: 40
ADLS_ACUITY_SCORE: 27
ADLS_ACUITY_SCORE: 34
ADLS_ACUITY_SCORE: 26
ADLS_ACUITY_SCORE: 34
WERE_AUXILIARY_AIDS_OFFERED?: YES
ADLS_ACUITY_SCORE: 34
ADLS_ACUITY_SCORE: 34
ADLS_ACUITY_SCORE: 33
ADLS_ACUITY_SCORE: 26

## 2023-01-01 ASSESSMENT — PAIN SCALES - GENERAL: PAINLEVEL: EXTREME PAIN (8)

## 2023-05-09 NOTE — TELEPHONE ENCOUNTER
Action 05/09/2023@1213PM CDK   Action Taken REQUEST SENT TO [facility] CALLED PT HE'S UNSURE OF LOCATION. IMAGING FROM [facility] IN 08/2022 AND IMAGING  FROM [facility] RESOLVED.  CK

## 2023-05-10 NOTE — PROGRESS NOTES
New Patient Oncology Nurse Navigator Note     Referring provider:   Dr Vince Lloyd   [facility]      Referred to (specialty): Medical Oncology    Requested provider (if applicable):   Dr. Bains     Date Referral Received:   5/9/23     Evaluation for :   kidney tumor, hematuria      Clinical History (per Nurse review of records provided):    This patient is being referred directly to Dr. Bains for renal mass with hematuria.  He was scheduled to see Dr. Erickson (urology) last October, 2022, for 5 cm renal mass, however he cancelled the second opinion as he was also seen by Dr. Myers ([facility]) who recommended no tx due to age.       Dr. Lloyd with [facility], is now urgently referring to Dr. Bains.  It does not appear any new imaging has been done.  I have verified with oncology team that he should see Dr. Bains at this time.      Records Location (Care Everywhere, Media, etc.):   [facility]      I called Soto, to discuss referral to oncology.  I introduced my role and reviewed what this consult visit will entail/what to expect.  I reviewed the location and gave contact numbers including new patient scheduling and clinic phone numbers.   Soto, has no other questions at this time.    I forwarded on referral with scheduling instructions for the following   PLAN: SCHEDULE: Dr. Bains, First available, 60 min NEW, in person @ N, within 2 weeks     I transferred call to our new patient scheduling to finalize appointment.    Shellie Hackett, RN, BSN  Oncology New Patient Nurse Navigator   [facility]  [phone]

## 2023-05-18 NOTE — PROGRESS NOTES
"Oncology Rooming Note    May 18, 2023 11:27 AM   Soto Gibbs is a 83 year old male who presents for:    Chief Complaint   Patient presents with     Oncology Clinic Visit     New consult kidney tumor     Initial Vitals: BP (!) 183/73   Pulse 62   Resp 18   Ht 1.905 m (6' 3\")   Wt 108.4 kg (239 lb)   SpO2 97%   BMI 29.87 kg/m   Estimated body mass index is 29.87 kg/m  as calculated from the following:    Height as of this encounter: 1.905 m (6' 3\").    Weight as of this encounter: 108.4 kg (239 lb). Body surface area is 2.4 meters squared.  Extreme Pain (8) Comment: Data Unavailable   No LMP for male patient.  Allergies reviewed: Yes  Medications reviewed: Yes    Medications: Medication refills not needed today.  Pharmacy name entered into [employer]: [id number] - [place], [place] - [address]    Clinical concerns:  New consult kidney tumor    Anupama Arana            "

## 2023-05-18 NOTE — LETTER
"    5/18/2023         RE: Soto Gibbs  [address] [address]        Dear Colleague,    Thank you for referring your patient, Soto Gibbs, to the [facility]. Please see a copy of my visit note below.    Oncology Rooming Note    May 18, 2023 11:27 AM   Soto Gibbs is a 83 year old male who presents for:    Chief Complaint   Patient presents with     Oncology Clinic Visit     New consult kidney tumor     Initial Vitals: BP (!) 183/73   Pulse 62   Resp 18   Ht 1.905 m (6' 3\")   Wt 108.4 kg (239 lb)   SpO2 97%   BMI 29.87 kg/m   Estimated body mass index is 29.87 kg/m  as calculated from the following:    Height as of this encounter: 1.905 m (6' 3\").    Weight as of this encounter: 108.4 kg (239 lb). Body surface area is 2.4 meters squared.  Extreme Pain (8) Comment: Data Unavailable   No LMP for male patient.  Allergies reviewed: Yes  Medications reviewed: Yes    Medications: Medication refills not needed today.  Pharmacy name entered into [employer]: [id number] - [place], [place] - [address]    Clinical concerns:  New consult kidney tumor    Anupama Arana              [facility] Hematology and Oncology Consult Note    Patient: Soto Gibbs  MRN: [id number]  Date of Service: May 18, 2023           Reason for consultation      Problem List Items Addressed This Visit    None  Visit Diagnoses     Right renal mass    -  Primary    Relevant Orders    Comprehensive metabolic panel    CBC with platelets    Lactate Dehydrogenase    UA Macroscopic with reflex to Microscopic and Culture    Stage 3 chronic kidney disease, unspecified whether stage 3a or 3b CKD (H)        Relevant Orders    Comprehensive metabolic panel    CBC with platelets    Lactate Dehydrogenase    UA Macroscopic with reflex to Microscopic and Culture    Pulmonary emphysema, unspecified emphysema type (H)        Relevant Orders    Comprehensive metabolic panel    CBC with " platelets    Lactate Dehydrogenase    UA Macroscopic with reflex to Microscopic and Culture            Assessment / number of problems addressed      1.  A very pleasant 83 years old gentleman with right renal mass.  Very suggestive of renal carcinoma.  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  Which typically is benign.  However clinically the way the size of the lesion is going it appears to be more malignant than benign.  2.  He has his medical records care everywhere so this needs to be consolidated and then reviewed.  3.  Check his BMP, CBC and UA.  4.  Discussed with him that I would get all his records together.  Then I will discuss with Dr. Nuñez and then, with the plan.    I did mention to him that due to his comorbid condition he may not be a candidate for radical nephrectomy.  We may have to do some ablative procedure.  If there is metastatic disease then we will need to biopsy attempt to confirm what can of malignancy areas and then consider immunotherapy if he is able to handle that.      Plan and medical decision making      1.  Get all his records together  2.  Discussed with Dr. Nuñez  3.  Get CBC CMP and UA.  4.  Depending upon the data we may have to repeat the scan.  We may have to biopsy any lesion which is growing etc.  5.  Advised to elevate his extremities lower extremities.  6.  Follow-up with Dr. Epps for other medical issues.      Clinical/pathological stage      Cancer Staging   No matching staging information was found for the patient.    History of present illness      Mr. Soto Gibbs is a 83 year old -American gentleman who was diagnosed with renal mass when he had a CT scan of the chest done for some pulmonary symptom in March 2022.  The CT scan showed some chronic emphysema but showed about 5.1 cm mass in the upper pole of the right kidney.  This was subsequently evaluated with the help of MRI which confirmed this to be a mass consistent  with RCC.  He was then evaluated by urology Dr. Nuñez in May 2022.  Dr. Garcia recommended that we should get all the scans together and perhaps get a biopsy.    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  There was no sarcomatoid component noted.  He then had a subsequent CT scan done in 2022.  The mass had increased in size from 5.1 to 5.8 cm in size.    He had another opinion from urology from Dr. Herrera which was looks like a virtual or phone visit.  He was recommended to consider observation because of his advanced age and comorbidities.    He is really has not had any follow-up for this up until recently.  About 3 weeks ago he started having some hematuria.  He went and saw Dr. Lloyd his primary care physician.  Dr. Lloyd asked him to come and see us.    He is giving history of having hematuria for the past 3 weeks.  Comes and goes.  He has some aches and pains which are there for quite some time.  He has lower extremity edema which is also chronic.  He is on nasal cannula oxygen due to his emphysema.      Detailed review of systems      A 14 point review of systems was obtained.  Positive findings noted in the history.  Rest of the review of system is otherwise negative.      Past medical/surgical/social/family history        No past medical history on file.  Past Surgical History:   Procedure Laterality Date     ABDOMEN SURGERY      Hernia     COLON SURGERY      Polyps.     No family history on file.  Social History     Socioeconomic History     Marital status: Single   Tobacco Use     Smoking status: Former     Types: Cigarettes     Quit date: 1985     Years since quittin.2     Smokeless tobacco: Never   Substance and Sexual Activity     Alcohol use: No     Drug use: No           Allergies      Allergies   Allergen Reactions     Morphine (Pf) [Morphine] Other (See Comments) and Dizziness     Hallucinations         Physical exam        BP (!)  "183/73   Pulse 62   Resp 18   Ht 1.905 m (6' 3\")   Wt 108.4 kg (239 lb)   SpO2 97%   BMI 29.87 kg/m        GENERAL: Alert and oriented to time place and person. Seated comfortably. In no distress.  He is on nasal oxygen.    HEAD: Atraumatic and normocephalic.    EYES: ORA, EOMI. No pallor. No icterus.    Oral cavity: no mucosal lesion or tonsillar enlargement.    NECK: supple. JVP normal.No thyroid enlargement.    LYMPH NODES: No palpable, cervical, axillary or inguinal lymphadenopathy.    CHEST: clear to auscultation bilaterally. Symmetrical breath movements bilaterally.    CVS: S1 and S2 are Regular rate and rhythm. No murmur or gallop or rub heard. No peripheral edema.    ABDOMEN: Soft. Not tender. Mass is palpable in the mid abdomen. Not distended. No palpable hepatomegaly or splenomegaly. No other mass palpable. Bowel sounds heard.    EXTREMITIES: Warm.  Minimal arthritic changes. B/l Lower ext edema.    NEUROLOGICAL: Alert awake oriented.  Otherwise intact.  Cranial nerves appears to be preserved.    SKIN: no rash, or bruising or purpura.      Laboratory data        Reviewed the path report which showed oncocytic tumor from your hospital.    Imaging results      I reviewed the CT scan results from March 2022 and July 2022.  He had a CT scan of the chest which showed a mass in his right kidney.  He subsequently had an MRI and then another CT scan of the chest abdomen and pelvis in July 2022.  That confirmed about 5.1 cm mass in the upper pole of the right kidney.  This was considered to be malignant.    The CT scan in July 2022 showed that mass to be bigger.  There was no capsular breach.  There was also multiple tiny cysts seen in both kidneys.  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  .    This note has been dictated using voice recognition software. Any grammatical or context distortions are unintentional and inherent to the " software      Signed by: Nawaf Bains MD, MD       Again, thank you for allowing me to participate in the care of your patient.        Sincerely,        Nawaf Bains MD, MD

## 2023-05-18 NOTE — PROGRESS NOTES
[facility] Hematology and Oncology Consult Note    Patient: Soto Gibbs  MRN: [id number]  Date of Service: May 18, 2023           Reason for consultation      Problem List Items Addressed This Visit    None  Visit Diagnoses     Right renal mass    -  Primary    Relevant Orders    Comprehensive metabolic panel    CBC with platelets    Lactate Dehydrogenase    UA Macroscopic with reflex to Microscopic and Culture    Stage 3 chronic kidney disease, unspecified whether stage 3a or 3b CKD (H)        Relevant Orders    Comprehensive metabolic panel    CBC with platelets    Lactate Dehydrogenase    UA Macroscopic with reflex to Microscopic and Culture    Pulmonary emphysema, unspecified emphysema type (H)        Relevant Orders    Comprehensive metabolic panel    CBC with platelets    Lactate Dehydrogenase    UA Macroscopic with reflex to Microscopic and Culture            Assessment / number of problems addressed      1.  A very pleasant 83 years old gentleman with right renal mass.  Very suggestive of renal carcinoma.  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  Which typically is benign.  However clinically the way the size of the lesion is going it appears to be more malignant than benign.  2.  He has his medical records care everywhere so this needs to be consolidated and then reviewed.  3.  Check his BMP, CBC and UA.  4.  Discussed with him that I would get all his records together.  Then I will discuss with Dr. Nuñez and then, with the plan.    I did mention to him that due to his comorbid condition he may not be a candidate for radical nephrectomy.  We may have to do some ablative procedure.  If there is metastatic disease then we will need to biopsy attempt to confirm what can of malignancy areas and then consider immunotherapy if he is able to handle that.      Plan and medical decision making      1.  Get all his records together  2.  Discussed with Dr. Nuñez  3.  Get  CBC CMP and UA.  4.  Depending upon the data we may have to repeat the scan.  We may have to biopsy any lesion which is growing etc.  5.  Advised to elevate his extremities lower extremities.  6.  Follow-up with Dr. Epps for other medical issues.      Clinical/pathological stage      Cancer Staging   No matching staging information was found for the patient.    History of present illness      Mr. Soto Gibbs is a 83 year old -American gentleman who was diagnosed with renal mass when he had a CT scan of the chest done for some pulmonary symptom in March 2022.  The CT scan showed some chronic emphysema but showed about 5.1 cm mass in the upper pole of the right kidney.  This was subsequently evaluated with the help of MRI which confirmed this to be a mass consistent with RCC.  He was then evaluated by urology Dr. Nuñez in May 2022.  Dr. Garcia recommended that we should get all the scans together and perhaps get a biopsy.    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  There was no sarcomatoid component noted.  He then had a subsequent CT scan done in July 2022.  The mass had increased in size from 5.1 to 5.8 cm in size.    He had another opinion from urology from Dr. Herrera which was looks like a virtual or phone visit.  He was recommended to consider observation because of his advanced age and comorbidities.    He is really has not had any follow-up for this up until recently.  About 3 weeks ago he started having some hematuria.  He went and saw Dr. Lloyd his primary care physician.  Dr. Lloyd asked him to come and see us.    He is giving history of having hematuria for the past 3 weeks.  Comes and goes.  He has some aches and pains which are there for quite some time.  He has lower extremity edema which is also chronic.  He is on nasal cannula oxygen due to his emphysema.      Detailed review of systems      A 14 point review of systems was obtained.   "Positive findings noted in the history.  Rest of the review of system is otherwise negative.      Past medical/surgical/social/family history        No past medical history on file.  Past Surgical History:   Procedure Laterality Date     ABDOMEN SURGERY      Hernia     COLON SURGERY      Polyps.     No family history on file.  Social History     Socioeconomic History     Marital status: Single   Tobacco Use     Smoking status: Former     Types: Cigarettes     Quit date: 1985     Years since quittin.2     Smokeless tobacco: Never   Substance and Sexual Activity     Alcohol use: No     Drug use: No           Allergies      Allergies   Allergen Reactions     Morphine (Pf) [Morphine] Other (See Comments) and Dizziness     Hallucinations         Physical exam        BP (!) 183/73   Pulse 62   Resp 18   Ht 1.905 m (6' 3\")   Wt 108.4 kg (239 lb)   SpO2 97%   BMI 29.87 kg/m        GENERAL: Alert and oriented to time place and person. Seated comfortably. In no distress.  He is on nasal oxygen.    HEAD: Atraumatic and normocephalic.    EYES: ORA, EOMI. No pallor. No icterus.    Oral cavity: no mucosal lesion or tonsillar enlargement.    NECK: supple. JVP normal.No thyroid enlargement.    LYMPH NODES: No palpable, cervical, axillary or inguinal lymphadenopathy.    CHEST: clear to auscultation bilaterally. Symmetrical breath movements bilaterally.    CVS: S1 and S2 are Regular rate and rhythm. No murmur or gallop or rub heard. No peripheral edema.    ABDOMEN: Soft. Not tender. Mass is palpable in the mid abdomen. Not distended. No palpable hepatomegaly or splenomegaly. No other mass palpable. Bowel sounds heard.    EXTREMITIES: Warm.  Minimal arthritic changes. B/l Lower ext edema.    NEUROLOGICAL: Alert awake oriented.  Otherwise intact.  Cranial nerves appears to be preserved.    SKIN: no rash, or bruising or purpura.      Laboratory data        Reviewed the path report which showed oncocytic tumor from your " [facility].    Imaging results      I reviewed the CT scan results from March 2022 and July 2022.  He had a CT scan of the chest which showed a mass in his right kidney.  He subsequently had an MRI and then another CT scan of the chest abdomen and pelvis in July 2022.  That confirmed about 5.1 cm mass in the upper pole of the right kidney.  This was considered to be malignant.    The CT scan in July 2022 showed that mass to be bigger.  There was no capsular breach.  There was also multiple tiny cysts seen in both kidneys.  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  .    This note has been dictated using voice recognition software. Any grammatical or context distortions are unintentional and inherent to the software      Signed by: Nawaf Bains MD, MD

## 2023-05-26 NOTE — PROGRESS NOTES
Phone call note    Called the patient after reviewing his CT scan from July 2022.  He definitely has a enlarging lesion in his upper lobe of the right kidney.  His UA does show small amount of hematuria.  His hemoglobin is trending downward.    Discussed with the patient that he needs another CT scan to see how much change the lesion has occurred.    I also sent a message to Dr. Medina to see if he can see him for a urology appointment.  I did tell the patient that he is not a great candidate for radical nephrectomy.  We may also need to consider biopsy if we are trying for more aggressive approach.  The other approach might be to do embolization or other ablative procedures.    Patient also complained of some constipation and he was advised to use some suppository etc.    Patient also complained of some dysphagia type symptoms.  He did have upper GI study done in December 2022.  That did show esophageal dysmotility.  He may require EGD down the road.    Nawaf Bains MD,

## 2023-05-26 NOTE — TELEPHONE ENCOUNTER
Patient's daughter calls in today stating that they saw Dr Bains on 5/18.  Patient is still waiting for his lab results and a plan.  I did let him know that the lab results are back but I do not believe that Dr Bains has reviewed them yet.  He is out of the office today so I will get a message over to him and have someone give him a call back with results and recommendations.  She verbalized understanding and was appreciative.    Angeles Biswas RN

## 2023-06-05 NOTE — PROGRESS NOTES
Called Soto to follow up prior to scheduled follow up apt with Dr. Erickson on 6/6. Noted that Dr. Bains had ordered CT CAP and that imaging has not yet been scheduled. Encouraged that he try to schedule CT prior to coming to see Dr. Erickson on 6/6, if possible. He states understanding and was warm transferred to scheduling to assist with arranging a time for CT scan, hopefully later today or earlier tomorrow so that results will be available for review/Susan Mccloud RN

## 2023-06-07 NOTE — TELEPHONE ENCOUNTER
Soto calls today to let us know his daughter took care of his insurance and he no longer has [employer] insurance and he wants to schedule an appointment with Dr. Erickson.  He was given the [facility] scheduling phone number of [phone] to schedule that appointment.

## 2023-06-12 PROBLEM — D64.9 SYMPTOMATIC ANEMIA: Status: ACTIVE | Noted: 2023-01-01

## 2023-06-12 PROBLEM — M62.81 GENERALIZED MUSCLE WEAKNESS: Status: ACTIVE | Noted: 2023-01-01
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
## 2023-06-12 NOTE — PHARMACY-ADMISSION MEDICATION HISTORY
Pharmacist Admission Medication History    Admission medication history is complete. The information provided in this note is only as accurate as the sources available at the time of the update.    Medication reconciliation/reorder completed by provider prior to medication history? No    Information Source(s): Patient and CareEverywhere/SureScripts via in-person    Pertinent Information: none    Changes made to PTA medication list:    Added: iron, B complex    Deleted: levetiracetam (from Sept 2022, one time fill, after a fall; not taking), tiotropium-olodaterol 2.5-2.5 mcg inhaler (nor Spiriva)    Changed: gabapentin PRN (just started taking again last week)    Allergies reviewed with patient and updates made in EHR: yes    Medication History Completed By: Leti Ward RPH 6/12/2023 4:28 PM    Prior to Admission medications    Medication Sig Last Dose Taking? Auth Provider Long Term End Date   albuterol (PROVENTIL HFA;VENTOLIN HFA) 90 mcg/actuation inhaler [ALBUTEROL (PROVENTIL HFA;VENTOLIN HFA) 90 MCG/ACTUATION INHALER] Inhale 2 puffs every 6 (six) hours as needed for wheezing or shortness of breath. Past Week Yes Caity Jung MD     albuterol (PROVENTIL) 2.5 mg /3 mL (0.083 %) nebulizer solution [ALBUTEROL (PROVENTIL) 2.5 MG /3 ML (0.083 %) NEBULIZER SOLUTION] Take 3 mL (2.5 mg total) by nebulization every 4 (four) hours as needed for shortness of breath. More than a month Yes Juan Pineda MD     atenolol (TENORMIN) 25 MG tablet [ATENOLOL (TENORMIN) 25 MG TABLET] Take 1 tablet (25 mg total) by mouth every morning. 6/12/2023 Yes Juan Pineda MD     ferrous sulfate (FEROSUL) 325 (65 Fe) MG tablet Take 325 mg by mouth daily (with breakfast) 6/12/2023 Yes Unknown, Entered By History     gabapentin (NEURONTIN) 300 MG capsule Take 300 mg by mouth 2 times daily as needed for neuropathic pain 6/12/2023 at am Yes Reported, Patient Yes    hydrochlorothiazide (HYDRODIURIL) 25 MG tablet Take 25 mg by mouth  daily 6/12/2023 Yes Unknown, Entered By History Yes    lisinopril (PRINIVIL,ZESTRIL) 20 MG tablet [LISINOPRIL (PRINIVIL,ZESTRIL) 20 MG TABLET] Take 20 mg by mouth every morning. 6/12/2023 Yes Provider, Historical     oxyCODONE-acetaminophen (PERCOCET) 5-325 mg per tablet [OXYCODONE-ACETAMINOPHEN (PERCOCET) 5-325 MG PER TABLET] Take 1 tablet by mouth every 4 (four) hours as needed for pain. 6/11/2023 Yes Provider, Historical     OXYGEN-AIR DELIVERY SYSTEMS MISC [OXYGEN-AIR DELIVERY SYSTEMS MISC] Inhale 2 L/min continuous. Indications: copd, sleep apnea 6/12/2023 Yes Juan Pineda MD     simvastatin (ZOCOR) 20 MG tablet Take 20 mg by mouth every evening 6/11/2023 Yes Provider, Historical     vitamin C with B complex (B COMPLEX-C) tablet Take 1 tablet by mouth daily 6/12/2023 Yes Unknown, Entered By History

## 2023-06-12 NOTE — TELEPHONE ENCOUNTER
Patient has been bleeding for a ,month, it is getting darker,no clost a or issues urinating. Advised to push fluids,he will schedule his imaging and see  as planned next week ,to ED for worsening or changing symptoms.  Lorrie Rodriguez LPN

## 2023-06-12 NOTE — ED PROVIDER NOTES
EMERGENCY DEPARTMENT ENCOUNTER       ED Course & Medical Decision Making     1:41 PM I introduced myself to the patient, obtained patient history, performed a physical exam, and discussed plan for ED workup including potential diagnostic laboratory/imaging studies and interventions.  2:50 PM I spoke with patient's daughter Flakita regarding patient update.   3:35 PM Spoke with Dr Alfaro     Final Impression  83 year old male presents for evaluation of a mechanical fall at about 8 AM this morning due to weakness.  Patient lives alone, was unable to call for help, the family later stopped by to check on him and found him on the ground.  States he has been feeling increasingly weak over the last several months.  Endorses several months of ongoing hematuria, chart review shows he has been seen by oncology for a 5.3 cm renal mass that is increasing in size and though biopsy states that his benign oncology still appears to have some reservations and they are continuing to follow this.  Hemoglobin seems to have trended down from 11.4 about 9 months ago down to about 8.9 previously, recheck today returned at 8.6.  Suspect patient is having at least some symptomatic anemia, source likely the constant hematuria he is describing.  UA grossly bloody here at bedside.  Patient does have weakness of bilateral lower extremities, unable to lift legs off bed due to profound weakness.  Is able to plantarflex and has normal sensation throughout his lower extremities grossly.  Did have a subdural several months ago, though repeat CT head today does not show any recurrence of subdural.  Does not sound like he hit his head this morning.  Does complain of some chronic abdominal and back pain, CT abdomen pelvis pending, does have a palpable ventral hernia that appears reducible though there is quite a bit of scar tissue around it.  Creatinine mildly elevated at 2.4, baseline appears closer to 2.0.  Lactic acid normal.  Will admit for  generalized weakness, mechanical fall, ELISABETH and likely symptomatic anemia.    Prior to making a final disposition on this patient the results of patient's tests and other diagnostic studies were discussed with the patient. All questions were answered. Patient expressed understanding of the plan and was amenable to it.    Medical Decision Making    History:    Supplemental history from: Daughter (by phone)    External Record(s) reviewed as documented below;  o 5/18/2023 oncology clinic note with Dr. 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  Been previously discussed with urology who favored observation because of advanced age and comorbidities.  Subsequent CT scan July/2022 showed the mass to be somewhat bigger.  Reported hematuria at that time.  o Prior/outside labs, hemoglobin 11.4 on 9/7/2022, slowly trended down to 10.5, 9.4, then latest 8.9 on 5/18/2023    External consultation:    Discussion of management with another provider: Hospitalist    Disposition considerations: Admit.      Medications   0.9% sodium chloride BOLUS (1,000 mLs Intravenous $New Bag 6/12/23 0264)       New Prescriptions    No medications on file       Final Impression     1. Fall, initial encounter    2. Generalized muscle weakness    3. Symptomatic anemia    4. ELISABETH (acute kidney injury) (H)        Chief Complaint     Chief Complaint   Patient presents with     Fall     Back Pain     HPI     Soto Gibbs is a 83 year old male who presents for evaluation of fall and back pain.    Patient reports a fall at 8 AM this morning while ambulating with his walker. States that he was trying to carry water in one of his hands when he turned his walker and fell. Patient lives alone but relatives later found him on the floor this afternoon and called EMS. He endorses back  "pain due to the fall and reports leg weakness and swelling. States that he's unsure if he lost consciousness but \"fell asleep a couple of times\" while on the floor. His most recent fall was 5-6 months ago. He is on 2L of O2 daily. Not on any blood thinners. No recent changes in medication.    Of note, patient has been having hematuria for the past 1.5 month. Also reports abdominal pain for the past couple of weeks due to a hernia. Has not had a bowel movement in 4 days. He takes a stool softeners everyday, but hasn't taken any yesterday and today. Does have a history of abdominal surgery.     I, Luis Armando Tuttle am serving as a scribe to document services personally performed by Dr. Frankie Dean MD, based on my observation and the provider's statements to me. I, Dr. Frankie Dean MD attest that Luis Armando Tuttle is acting in a scribe capacity, has observed my performance of the services and has documented them in accordance with my direction.    Physical Exam     BP (!) 160/67   Pulse 66   Temp 98.2  F (36.8  C) (Oral)   Resp 24   Ht 1.905 m (6' 3\")   Wt 108.4 kg (239 lb)   SpO2 99%   BMI 29.87 kg/m    Constitutional: Awake, alert, in no acute distress.  Head: Normocephalic, atraumatic.  ENT: Mucous membranes moist.  Eyes: Conjunctiva normal.  Respiratory: Respirations even, unlabored, in no acute respiratory distress.  Cardiovascular: Regular rate and rhythm. Good peripheral perfusion.  +1 to +2 pitting edema bilateral lower extremities up to the midshin.  GI: Abdomen soft, non-tender.  Palpable hernia with adjacent scar tissue inferior to the umbilicus, appears to be reducible at bedside.  Musculoskeletal: Moves all 4 extremities equally.  Integument: Warm, dry.  Neurologic: Alert & oriented x 3. Normal speech.  Grossly normal sensory function, normal able to plantarflex, though unable to lift lower extremities off the bed due to weakness.. No focal deficits noted.  Psychiatric: Normal " mood    Labs & Imaging     Imaging reviewed and independently interpreted as below;   CT head images reviewed, no intracranial bleeding seen, no skull fracture    Results for orders placed or performed during the hospital encounter of 06/12/23   Head CT w/o contrast    Impression    IMPRESSION:  1.  No CT evidence for acute intracranial process.  2.  Brain atrophy and presumed chronic microvascular ischemic changes as above.   Basic metabolic panel   Result Value Ref Range    Sodium 143 136 - 145 mmol/L    Potassium 4.1 3.5 - 5.0 mmol/L    Chloride 99 98 - 107 mmol/L    Carbon Dioxide (CO2) 37 (H) 22 - 31 mmol/L    Anion Gap 7 5 - 18 mmol/L    Urea Nitrogen 44 (H) 8 - 28 mg/dL    Creatinine 2.42 (H) 0.70 - 1.30 mg/dL    Calcium 8.8 8.5 - 10.5 mg/dL    Glucose 138 (H) 70 - 125 mg/dL    GFR Estimate 26 (L) >60 mL/min/1.73m2   Troponin I (now)   Result Value Ref Range    Troponin I 0.02 0.00 - 0.29 ng/mL   Lactic acid whole blood   Result Value Ref Range    Lactic Acid 1.1 0.7 - 2.0 mmol/L   CBC with platelets and differential   Result Value Ref Range    WBC Count 7.0 4.0 - 11.0 10e3/uL    RBC Count 3.02 (L) 4.40 - 5.90 10e6/uL    Hemoglobin 8.6 (L) 13.3 - 17.7 g/dL    Hematocrit 30.3 (L) 40.0 - 53.0 %     78 - 100 fL    MCH 28.5 26.5 - 33.0 pg    MCHC 28.4 (L) 31.5 - 36.5 g/dL    RDW 15.3 (H) 10.0 - 15.0 %    Platelet Count 178 150 - 450 10e3/uL    % Neutrophils 64 %    % Lymphocytes 24 %    % Monocytes 11 %    % Eosinophils 0 %    % Basophils 0 %    % Immature Granulocytes 1 %    NRBCs per 100 WBC 0 <1 /100    Absolute Neutrophils 4.5 1.6 - 8.3 10e3/uL    Absolute Lymphocytes 1.7 0.8 - 5.3 10e3/uL    Absolute Monocytes 0.8 0.0 - 1.3 10e3/uL    Absolute Eosinophils 0.0 0.0 - 0.7 10e3/uL    Absolute Basophils 0.0 0.0 - 0.2 10e3/uL    Absolute Immature Granulocytes 0.0 <=0.4 10e3/uL    Absolute NRBCs 0.0 10e3/uL   Extra Blue Top Tube   Result Value Ref Range    Hold Specimen JIC    Adult Type and Screen   Result  Value Ref Range    ABO/RH(D) A POS     Antibody Screen Negative Negative    SPECIMEN EXPIRATION DATE [id number]        EKG     EKG reviewed and independently interpreted as below;       Frankie Dean MD  06/12/23 5797

## 2023-06-12 NOTE — CONSULTS
Care Management Initial Consult    General Information  Assessment completed with: Patient,    Type of CM/SW Visit: Initial Assessment    Primary Care Provider verified and updated as needed: Yes   Readmission within the last 30 days: no previous admission in last 30 days      Reason for Consult: discharge planning  Advance Care Planning: Advance Care Planning Reviewed:  (no HCD on file. Declines offers for blank HCD.)        Communication Assessment  Patient's communication style: spoken language (English or Bilingual)        Cognitive  Cognitive/Neuro/Behavioral: WDL                      Living Environment:   People in home: alone     Current living Arrangements: house      Able to return to prior arrangements: yes     Family/Social Support:  Care provided by: self  Provides care for: no one  Marital Status: Single  Children (friends, 2 daughters and 2 sons)          Description of Support System: Supportive, Involved       Current Resources:   Patient receiving home care services: No  Community Resources: None  Equipment currently used at home:    Supplies currently used at home: Oxygen Tubing/Supplies, Compression Stockings (home 02 and CPAP from [facility] (2L all times). cane and walker. Rx glasses.)    Employment/Financial:  Employment Status: retired     Employment/ Comments: no  or [employer]  Financial Concerns: No concerns identified   Referral to Financial Worker: No     Does the patient's insurance plan have a 3 day qualifying hospital stay waiver?  Unable to confirm. Has Medicare RR plan.     Lifestyle & Psychosocial Needs:  Social Determinants of Health     Tobacco Use: Medium Risk (9/6/2022)    Patient History      Smoking Tobacco Use: Former      Smokeless Tobacco Use: Never      Passive Exposure: Not on file   Alcohol Use: Not on file   Financial Resource Strain: Not on file   Food Insecurity: Not on file   Transportation Needs: Not on file   Physical Activity: Not on file   Stress: Not on  "file   Social Connections: Not on file   Intimate Partner Violence: Not on file   Depression: Not at risk (10/14/2022)    PHQ-2      PHQ-2 Score: 0   Housing Stability: Not on file     Functional Status:  Prior to admission patient needed assistance:   Dependent ADLs:: Independent (cane outside the home and walker inside the home)  Dependent IADLs:: Independent except he does not drive     Mental Health Status:  Mental Health Status:  (denies)       Chemical Dependency Status:  Chemical Dependency Status:  (denies)           Values/Beliefs:  Spiritual, Cultural Beliefs, Christian Practices, Values that affect care: no               Additional Information:  Chart reviewed. Had fall prior to coming to ER.     CM provided MOON notice and education. Has Medicare RR plan. He verbalized understanding and signed. Original in chart and copy given to patient.    CM completed assessment with patient. He lives alone. Independent with all cares but does not drive- \"my friends do that\". Has CPAP and home 02- 2L continuous [facility] by [facility]. He uses a cane outside the home and walker inside the home.     Mentions he previously refused offers for HC services but is now agreeable to Home PT and HHA only; CM sent referral. He is not interested in TCU placement. He declines offers to include one of his children in admission or discharge planning. He states he will \"call a friend\" to transport at hospital discharge and friend will bring portable 02.    Zena North RN        "

## 2023-06-12 NOTE — ED TRIAGE NOTES
Pt arrives via EMS after a fall this am. He was trying to get some water and while trying to carry the water and turn his walker he got caught up and fell on his buttock. No thinners. Denies LOC. States he is unsure of what time he fell but family found him this afternoon. Lives alone. Always on 2L O2 at home. C/o low back and tailbone pain.     Triage Assessment     Row Name 06/12/23 6955       Triage Assessment (Adult)    Airway WDL WDL       Respiratory WDL    Respiratory WDL WDL       Skin Circulation/Temperature WDL    Skin Circulation/Temperature WDL WDL       Cardiac WDL    Cardiac WDL WDL       Peripheral/Neurovascular WDL    Peripheral Neurovascular WDL WDL       Cognitive/Neuro/Behavioral WDL    Cognitive/Neuro/Behavioral WDL WDL

## 2023-06-12 NOTE — ED NOTES
Bed: [facility]10  Expected date: 6/12/23  Expected time: 1:20 PM  Means of arrival: Ambulance  Comments:  83 M Fall weakness

## 2023-06-12 NOTE — H&P
[facility]    History and Physical - Hospitalist Service       Date of Admission:  6/12/2023    Assessment & Plan      Soto Gibbs is a 83 year old male admitted on 6/12/2023. He has been admitted post fall this morning. He was trying to get some water and while trying to carry the water and turn his walker he got caught up and fell on his buttock. Denies LOC. He Lives alone. C/o low back and tailbone pain. Patient subsequently came to the ER for further evaluation and management. CT head : no acute abnormality.     CT chest/abdomen/pelvis :  Masslike enlargement of the right mid to upper kidney highly suspicious for a primary renal cell carcinoma. Note that evaluation of the kidneys is limited due to the lack of contrast. Multiple bilateral pulmonary nodules likely representing pulmonary metastases.Spinal and pelvic degenerative changes. No definite suspicious osseous lesion. Post mid ventral abdominal hernia repair with a small fat-containing hernia above the mesh material. A small presumed chronic seroma is noted below the mesh material. Diastases recti and anterior bulging of the mesh material are also noted.    Will consult urology, oncology for abnormal CT chest/abdomen : concerning for renal cell cancer  He has been having hematuria and also has drop in hb    Also consult surgery for Post mid ventral abdominal hernia repair with a small fat-containing hernia above the mesh material. A small presumed chronic seroma is noted below the mesh material. Diastases recti and anterior bulging of the mesh material are also noted.  He has abdominal tenderness  Keep NPO, on iv fluids, pain control        A/p :           S/p fall, mechanical : CT head normal.      Abnormal CT chest/abdomen/pelvis : concerning for renal mass - right    Hematuria     Anemia, chronic    CT chest/abdomen/pelvis :  Masslike enlargement of the right mid to upper kidney highly suspicious for a primary renal cell  carcinoma. Note that evaluation of the kidneys is limited due to the lack of contrast. Multiple bilateral pulmonary nodules likely representing pulmonary metastases.Spinal and pelvic degenerative changes. No definite suspicious osseous lesion. Post mid ventral abdominal hernia repair with a small fat-containing hernia above the mesh material. A small presumed chronic seroma is noted below the mesh material. Diastases recti and anterior bulging of the mesh material are also noted.    Will consult urology, oncology for abnormal CT chest/abdomen : concerning for renal cell cancer  He has been having hematuria and also has drop in hb        Abdominal pain    Post mid ventral abdominal hernia repair with a small fat-containing hernia above the mesh material. A small presumed chronic seroma is noted below the mesh material. Diastases recti and anterior bulging of the mesh material are also noted - on CT abdomen    He has abdominal tenderness  Consult surgery  Keep NPO, on iv fluids, pain control      Chronic hypoxic respiratory failure : on 2 liters of oxygen - at home, stable    COPD : on albuterol prn : stable.          Low backache, tailbone, acute : check thoracic and lumbar spine XR, pain control, pt/ot      HLD: on statin      acute on CKD stage 3b : baseline at around 2.0, currently at 2.42, on iv hydration      HTN, Essential : on atenolol 25 mg daily, lisinopril 20 mg daily - at home.      Neuropathy : on gabapentin 300 mg bid prn      H/o sleep apnea : non compliant with cpap           Diet: Regular Diet Adult    DVT Prophylaxis: Pneumatic Compression Devices  Hill Catheter: Not present  Lines: None     Cardiac Monitoring: None  Code Status:   full    Clinically Significant Risk Factors Present on Admission                  # Hypertension: Home medication list includes antihypertensive(s)      # Overweight: Estimated body mass index is 29.87 kg/m  as calculated from the following:    Height as of this  "encounter: 1.905 m (6' 3\").    Weight as of this encounter: 108.4 kg (239 lb).            Disposition Plan      Expected Discharge Date: 06/13/2023      Destination: home;home with help/services            Tyler Alfaro MD  Hospitalist Service  [facility]  Securely message with [employer] (more info)  Text page via [employer] Paging/Directory     ______________________________________________________________________    Chief Complaint   fall    History is obtained from the patient    History of Present Illness       Soto Gibbs is a 83 year old male admitted on 6/12/2023. He has been admitted post fall this morning. He was trying to get some water and while trying to carry the water and turn his walker he got caught up and fell on his buttock. Denies LOC. He Lives alone. C/o low back and tailbone pain. Patient subsequently came to the ER for further evaluation and management. CT head : no acute abnormality.     CT chest/abdomen/pelvis :  Masslike enlargement of the right mid to upper kidney highly suspicious for a primary renal cell carcinoma. Note that evaluation of the kidneys is limited due to the lack of contrast. Multiple bilateral pulmonary nodules likely representing pulmonary metastases.Spinal and pelvic degenerative changes. No definite suspicious osseous lesion. Post mid ventral abdominal hernia repair with a small fat-containing hernia above the mesh material. A small presumed chronic seroma is noted below the mesh material. Diastases recti and anterior bulging of the mesh material are also noted.    Will consult urology, oncology for abnormal CT chest/abdomen : concerning for renal cell cancer  He has been having hematuria and also has drop in hb    Also consult surgery for Post mid ventral abdominal hernia repair with a small fat-containing hernia above the mesh material. A small presumed chronic seroma is noted below the mesh material. Diastases recti and anterior bulging of the " mesh material are also noted.  He has abdominal tenderness  Keep NPO, on iv fluids, pain control          Past Medical History    No past medical history on file.    Past Surgical History   Past Surgical History:   Procedure Laterality Date     ABDOMEN SURGERY      Hernia     COLON SURGERY      Polyps.       Prior to Admission Medications   Prior to Admission Medications   Prescriptions Last Dose Informant Patient Reported? Taking?   OXYGEN-AIR DELIVERY SYSTEMS MISC 6/12/2023  Yes Yes   Sig: [OXYGEN-AIR DELIVERY SYSTEMS MISC] Inhale 2 L/min continuous. Indications: copd, sleep apnea   albuterol (PROVENTIL HFA;VENTOLIN HFA) 90 mcg/actuation inhaler Past Week  No Yes   Sig: [ALBUTEROL (PROVENTIL HFA;VENTOLIN HFA) 90 MCG/ACTUATION INHALER] Inhale 2 puffs every 6 (six) hours as needed for wheezing or shortness of breath.   albuterol (PROVENTIL) 2.5 mg /3 mL (0.083 %) nebulizer solution More than a month  No Yes   Sig: [ALBUTEROL (PROVENTIL) 2.5 MG /3 ML (0.083 %) NEBULIZER SOLUTION] Take 3 mL (2.5 mg total) by nebulization every 4 (four) hours as needed for shortness of breath.   atenolol (TENORMIN) 25 MG tablet 6/12/2023  No Yes   Sig: [ATENOLOL (TENORMIN) 25 MG TABLET] Take 1 tablet (25 mg total) by mouth every morning.   ferrous sulfate (FEROSUL) 325 (65 Fe) MG tablet 6/12/2023  Yes Yes   Sig: Take 325 mg by mouth daily (with breakfast)   gabapentin (NEURONTIN) 300 MG capsule 6/12/2023 at am  Yes Yes   Sig: Take 300 mg by mouth 2 times daily as needed for neuropathic pain   hydrochlorothiazide (HYDRODIURIL) 25 MG tablet 6/12/2023  Yes Yes   Sig: Take 25 mg by mouth daily   lisinopril (PRINIVIL,ZESTRIL) 20 MG tablet 6/12/2023  Yes Yes   Sig: [LISINOPRIL (PRINIVIL,ZESTRIL) 20 MG TABLET] Take 20 mg by mouth every morning.   oxyCODONE-acetaminophen (PERCOCET) 5-325 mg per tablet 6/11/2023  Yes Yes   Sig: [OXYCODONE-ACETAMINOPHEN (PERCOCET) 5-325 MG PER TABLET] Take 1 tablet by mouth every 4 (four) hours as needed for  pain.   simvastatin (ZOCOR) 20 MG tablet 6/11/2023  Yes Yes   Sig: Take 20 mg by mouth every evening   vitamin C with B complex (B COMPLEX-C) tablet 6/12/2023  Yes Yes   Sig: Take 1 tablet by mouth daily      Facility-Administered Medications: None        Review of Systems          No fever or chills  No cp, cough or phlegm  No neuro symptoms    Physical Exam   Vital Signs: Temp: 98.2  F (36.8  C) Temp src: Oral BP: (!) 154/66 Pulse: 72   Resp: 27 SpO2: 99 % O2 Device: Nasal cannula Oxygen Delivery: 2 LPM  Weight: 239 lbs 0 oz       GENERAL: The patient is not in any acute distressed. Awake and alert.  HEENT: Nonicteric sclerae, PERRLA, EOMI. Oropharynx clear. Moist mucous membranes. Conjunctivae appear well perfused.  HEART: Regular rate and rhythm without murmurs.  LUNGS: Clear to auscultation bilaterally. No wheezing or crackles.  ABDOMEN: Soft, positive bowel sounds, tender.  SKIN: No rash, no excessive bruising, petechiae, or purpura.  EXTREMITIES : no rashes, no swelling in legs.  NEUROLOGIC: conscious and oriented, follows commands, no obvious focal deficits.  ROS: All other systems negative       Medical Decision Making       76 MINUTES SPENT BY ME on the date of service doing chart review, history, exam, documentation & further activities per the note.  MANAGEMENT DISCUSSED with the following over the past 24 hours: rn, patient       Data     I have personally reviewed the following data over the past 24 hrs:    6.3  \   8.7 (L)   / 162     143 102 42 (H) /  115   5.2 (H) 33 (H) 2.14 (H) \       ALT: 11 AST: 19 AP: 59 TBILI: 0.5   ALB: 3.5 TOT PROTEIN: 6.6 LIPASE: N/A       Procal: N/A CRP: N/A Lactic Acid: 1.1

## 2023-06-12 NOTE — LETTER
[facility]  [address] [address]  Phone: [phone]  Fax: [phone]    June 20, 2023                To whom it may concern:    RE: Soto Gibbs    {:[id number]}    Please contact me for questions or concerns.      Sincerely,        No name on file

## 2023-06-12 NOTE — TELEPHONE ENCOUNTER
[facility] Call Center    Phone Message    May a detailed message be left on voicemail: yes     Reason for Call: Symptoms or Concerns     If patient has red-flag symptoms, warm transfer to triage line    Current symptom or concern: Patient is having blood in urine that he is concerned about still. Would like to speak to care team. Please reach out. Thank you    Symptoms have been present for:  1 month(s)    Has patient previously been seen for this? No            Action Taken: Message routed to:  [facility] ([facility] URO    Travel Screening: Not Applicable

## 2023-06-13 PROBLEM — N17.9 AKI (ACUTE KIDNEY INJURY) (H): Status: ACTIVE | Noted: 2023-01-01

## 2023-06-13 NOTE — CONSULTS
[facility] CONSULT       Type of consult: inpatient  Place of service: [facility]   Reason for consult: right renal mass  Requested by: Dr. Alfaro    History of present illness:   Soto Gibbs is a 83 year old male history of FORTUNATO, chronic hypoxic respiratory failure (O2 dependent), COPD, hyperlipidemia, CKD 3, HTN, neuropathy; Admitted to the hospital 6/12/2023 status post mechanical fall with abdominal pain, ELISABETH on CKD 3, enlarging right renal mass (cancer by biopsy, lower grade and oncocytic features). Urology was consulted for right renal mass noted on CT chest abdomen pelvis 6/12/2023. History is obtained from patient and chart review.     Patient is known to [facility] (Dr. Myers) for enlarging right renal mass (4.9 cm on 3/2/22 CT, 6.8 cm on 7/11/22 CT. Pt initially saw Dr. Erickson ([facility]) in 5/2022 for renal mass noted incidentally on CT for pulmonary evaluation. Reports he then saw Dr. Myers d/t insurance issues. 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    Last visit with Dr. Myers via telephone visit with patient's daughter, Vangie, on 10/3/2022. Per Dr. Myers's clinic note, conversation was had about the mass being cancerous and growing and offered options of radical nephrectomy versus continued observation. Given patient's age, oxygen dependence and pt's refusal for surgery, daughter declined surgery.    Pt following with Dr. Bains since 5/18/23.      CT chest abdomen pelvis 6/12/2023 showed 8.3 x 8.2 x 6.0 cm masslike enlargement of the mid to upper right kidney with effacement of the hilum, no hydroureteronephrosis, multiple new bilateral multilobar pulmonary nodules concerning for metastasis.     6/12/2023 labs: Creatinine 2.42 (baseline appears 1.94- 2.08 over  the last 9 months), Hgb 8.6 (8.9 on 2023), WBC 7.0.    Pt reports some GH for some time. Denies difficulty voiding and denies clots.     Past medical history:  See HPI above.    Past surgical history  Past Surgical History:   Procedure Laterality Date     ABDOMEN SURGERY      Hernia     COLON SURGERY      Polyps.       Social history  Social History     Socioeconomic History     Marital status: Single     Spouse name: Not on file     Number of children: Not on file     Years of education: Not on file     Highest education level: Not on file   Occupational History     Not on file   Tobacco Use     Smoking status: Former     Types: Cigarettes     Quit date: 1985     Years since quittin.3     Smokeless tobacco: Never   Vaping Use     Vaping status: Not on file   Substance and Sexual Activity     Alcohol use: No     Drug use: No     Sexual activity: Not on file   Other Topics Concern     Not on file   Social History Narrative     Not on file     Social Determinants of Health     Financial Resource Strain: Not on file   Food Insecurity: Not on file   Transportation Needs: Not on file   Physical Activity: Not on file   Stress: Not on file   Social Connections: Not on file   Intimate Partner Violence: Not on file   Housing Stability: Not on file         Medications  Current Facility-Administered Medications   Medication     acetaminophen (TYLENOL) tablet 650 mg    Or     acetaminophen (TYLENOL) Suppository 650 mg     atenolol (TENORMIN) tablet 25 mg     bisacodyl (DULCOLAX) suppository 10 mg     hydrALAZINE (APRESOLINE) injection 10 mg     HYDROmorphone (DILAUDID) half-tab 1 mg     HYDROmorphone (DILAUDID) injection 0.4 mg     HYDROmorphone (DILAUDID) tablet 2 mg     lidocaine (LMX4) cream     lidocaine 1 % 0.1-1 mL     melatonin tablet 1 mg     naloxone (NARCAN) injection 0.2 mg    Or     naloxone (NARCAN) injection 0.4 mg    Or     naloxone (NARCAN) injection 0.2 mg    Or     naloxone (NARCAN) injection  "0.4 mg     naloxone (NARCAN) injection 0.2 mg    Or     naloxone (NARCAN) injection 0.4 mg    Or     naloxone (NARCAN) injection 0.2 mg    Or     naloxone (NARCAN) injection 0.4 mg     ondansetron (ZOFRAN ODT) ODT tab 4 mg    Or     ondansetron (ZOFRAN) injection 4 mg     polyethylene glycol (MIRALAX) Packet 17 g     prochlorperazine (COMPAZINE) injection 5 mg    Or     prochlorperazine (COMPAZINE) tablet 5 mg    Or     prochlorperazine (COMPAZINE) suppository 12.5 mg     simvastatin (ZOCOR) tablet 20 mg     sodium chloride (PF) 0.9% PF flush 3 mL     sodium chloride (PF) 0.9% PF flush 3 mL     sodium chloride 0.9% infusion       Allergies  Allergies   Allergen Reactions     Morphine (Pf) [Morphine] Other (See Comments) and Dizziness     Hallucinations       Review of systems  12 point review of system is otherwise negative except what is stated in HPI    Physical exam:  BP (!) 188/81 (BP Location: Right arm, Patient Position: Chair, Cuff Size: Adult Regular)   Pulse 64   Temp 98.1  F (36.7  C) (Oral)   Resp 20   Ht 1.905 m (6' 3\")   Wt 111.9 kg (246 lb 9.6 oz)   SpO2 94%   BMI 30.82 kg/m     GENERAL: NAD, alert, cooperative  HEAD: normocephalic/atraumatic   ABDOMEN: Soft, mildly tender x 4Q, non- distended, no palpable masses, no rebound or peritoneal signs, and mild bilateral CVA tenderness  SKIN: no rashes or lesions  MUSCULOSKELETAL: moves all four extremities equally, no pedal edema  PSYCHOLOGICAL: alert and oriented, answers questions appropriately    Labs:   Lab Results   Component Value Date    WBC 6.3 06/13/2023    HGB 8.7 (L) 06/13/2023    HCT 29.7 (L) 06/13/2023     06/13/2023    CHOL 192 12/05/2022    TRIG 149 12/05/2022    HDL 54 12/05/2022    ALT 11 06/13/2023    AST 19 06/13/2023     06/13/2023    BUN 42 (H) 06/13/2023    CO2 33 (H) 06/13/2023    TSH 0.85 12/05/2022    PSA 4.49 12/05/2022    INR 1.10 09/06/2022       I have personally reviewed these labs.     Imaging:  EXAM: CT " CHEST ABDOMEN PELVIS W/O CONTRAST  LOCATION: [facility]  DATE/TIME: 6/12/2023 2:59 PM CDT     INDICATION: abd pain, palpable hernia midline ventral, some chronic back pain known renal mass with hematuria, Oncology has also ordered this study for 2 days from now  COMPARISON: CT chest 09/04/2019  TECHNIQUE: CT scan of the chest, abdomen, and pelvis was performed without IV contrast. Multiplanar reformats were obtained. Dose reduction techniques were used.   CONTRAST: None.     FINDINGS:   LUNGS AND PLEURA: The central airways are clear. Mild bronchial wall thickening. Similar severe bullous emphysematous changes. Multiple new bilateral multilobar pulmonary nodules. This includes a newly visualized 10 x 12 mm solid right lower lobe nodule   image 202 series 4. Stable 6 mm right lower lobe nodule measuring 12. New 7 mm left lower lobe nodule image 114. New 12 mm nodule image 196. Multiple additional nodules. No pleural effusion.     MEDIASTINUM/AXILLAE: Bilateral gynecomastia. Diffuse chest wall edema. Stable benign 1.9 cm cyst adjacent to the right first costochondral joint, perhaps a synovial cyst. No thoracic adenopathy. Normal heart size and no pericardial effusion. Dilated   central pulmonary arteries suggesting pulmonary artery hypertension. Stable dilated esophagus which could reflect achalasia.     CORONARY ARTERY CALCIFICATION: Mild.     HEPATOBILIARY: Normal.     PANCREAS: Normal.     SPLEEN: Normal.     ADRENAL GLANDS: Stable left adrenal gland thickening.     KIDNEYS/BLADDER: Limited evaluation due to the lack of contrast. Masslike enlargement of the mid to upper right kidney with effacement of the hilum. This masslike area measures 8.3 x 8.2 x 6.0 cm. 2.4 cm right mid renal cyst. No follow-up is indicated.   No hydronephrosis or hydroureter.     BOWEL: Normal caliber small bowel and colon. Apparent anastomotic suture line within a colonic segment in the mid abdomen. Post ventral  abdominal wall hernia repair with diastases recti and anterior bulging of intra-abdominal contents below the mesh   material. Associated 1.0 x 3.2 cm fluid collection below the mesh material, likely a chronic seroma. Small fat-containing hernia above the mesh material. Mild surrounding fat stranding is likely chronic. Tiny right inguinal hernia containing fat and   trace fluid. No free air.     LYMPH NODES: No thoracic adenopathy.     VASCULATURE: Mild to moderate aortoiliac atherosclerosis.     PELVIC ORGANS: Moderate prostatomegaly. Presacral edema. No pelvic free fluid.     MUSCULOSKELETAL: Spinal and pelvic degenerative changes. L4-L5 laminectomy and fusion. No definite suspicious osseous lesion.                                                                      IMPRESSION:  1.  Masslike enlargement of the right mid to upper kidney highly suspicious for a primary renal cell carcinoma. Note that evaluation of the kidneys is limited due to the lack of contrast.  2.  Multiple bilateral pulmonary nodules likely representing pulmonary metastases.  3.  Spinal and pelvic degenerative changes. No definite suspicious osseous lesion.  4.   Post mid ventral abdominal hernia repair with a small fat-containing hernia above the mesh material. A small presumed chronic seroma is noted below the mesh material. Diastases recti and anterior bulging of the mesh material are also noted.    I have reviewed the imaging reports above.     Assessment/plan:   Soto Gibbs 1940 is being seen by [facility] for enlarging right renal mass    -83-year-old male with history of biopsy-proven right kidney cancer (8/25/2022) for which patient and/or daughter previously declined nephrectomy.  - Right renal mass enlarging, now 8.3 cm on 6/12 CT and gross hematuria present.    - Multiple bilateral pulmonary nodules noted on CT, concerning for metastasis. CT guided biopsy planned for 6/14 to determine if renal mets.   -Creatinine  improving, 2.14 today (baseline appears 1.94- 2.08 over the last 9 months).  Avoid nephrotoxins and monitor.  -Hgb stable at 8.7 today. Urine translucent dark cherry, not clots. Monitor.   -Currently no urgent surgical urologic intervention indicated. Will continue to follow.   -Maintain follow up appt with Dr. Erickson 7/20/23 to further discuss options for renal mass management.   - [facility] [place]  - Case discussed with Dr. Myers.     Thank you for consulting [facility] regarding this patient's care. Please contact us with questions or concerns.     Jaron Aguilar, APRN, CNP  [facility]   [phone]

## 2023-06-13 NOTE — PROGRESS NOTES
"PRIMARY DIAGNOSIS: \"GENERIC\" NURSING  OUTPATIENT/OBSERVATION GOALS TO BE MET BEFORE DISCHARGE:  1. ADLs back to baseline: Yes    2. Activity and level of assistance: Up with standby assistance.    3. Pain status: Improved-controlled with oral pain medications.    4. Return to near baseline physical activity: No; generalized weakness.     Discharge Planner Nurse   Safe discharge environment identified: No  Barriers to discharge: Yes; awaiting medical clearance.        Entered by: Clari Jean RN 06/13/2023 12:32 AM     Please review provider order for any additional goals.   Nurse to notify provider when observation goals have been met and patient is ready for discharge.    Reports moderate - severe pain to back. PRN IV hydromorphone 0.3 mg given for breakthrough pain; effective relief. Will continue to monitor and intervene as needed. Tele- NSR. Denies SOB, chest pain and N/V. On 2 L/min NC; sat in upper 90s.   "

## 2023-06-13 NOTE — PROGRESS NOTES
[facility]    Medicine Progress Note - Hospitalist Service    Date of Admission:  6/12/2023    Assessment & Plan   83-year-old with history of chronic kidney disease, hypertension, peripheral neuropathy, previous ventral hernia repair with mesh, chronic respiratory failure on supplemental oxygen and COPD, who presents after a fall on 6/12/2023.  Patient was found on the floor in his home.  Patient complained of gross hematuria for several months.  Patient had known right renal mass with previous biopsy which indicated benign mass.  Admitted for gross hematuria, generalized weakness, fall, progression and chronic anemia likely secondary to continued blood loss from right renal mass.  Patient also complained of some chronic abdominal and back pain.  CT abdomen revealed ventral abdominal hernia.    Gross hematuria  Right oncocytic tumor grade 2   Chronic normocytic anemia  Core biopsy on 9/29/22.   Hemoglobin 8.7 g/dL, stable 8.6g/dL on admission.  Order Iron studies, reticulocyte count. Vitamin b12 level.   CBC daily.   Transfuse for hemoglobin < 7 g/dL.   Urology and oncology consulted by Dr. Alfaro.     Abdominal pain  Ventral abdominal hernia  History of hernia repair with mesh.   General surgery was consulted.   Npo status.   Continue tylenol, hydromorphone 1-2 mg q4h prn  hydromorphone 0.3 mg IV q3h prn for uncontrolled pain.    Constipation  Without bowel movement for 5 days.   Bisacodyl suppository 10 mg rectal daily prn.   Will start miralax 17 g po BID pending general surgery recommendations.     Fall initial encounter  Degenerative disc disease lumbar spine.   Coccyx pain  CT head negative for acute pathology.  Xray thoracic and lumbar spine with previous posterior fusion L4-L5 and no complication of hardware, no acute fracture.  Multilevel degenerative disc disease noted.   PT and OT evaluation  Continue pain control as above.     Chronic respiratory failure with  "hypoxia  COPD  Stable.   Continue home inhalers and supplemental oxygen at 2L/min    Hyperlipidemia  Continue home statin.     Acute kidney injury on Chronic kidney disease stage 3b.   Mild hyperkalemia  Creatinine baseline 2.0, max 2.42 on admission. Now 2.14.   Potassium 5.2.   Continue IV hydration  ml/hr.   Hold lisinopril.    Accelerated essential hypertension  Order hydralazine 10 mg IV q6h prn for SBP > 160 mmHg.   Continue home PTA medications: atenolol 25 mg daily,   Hold lisinopril 20 mg daily for ELISABETH, hyperkalemia.     Peripheral neuropathy  Continue gabapentin 300 mg BID prn.     Sleep apnea  Non compliant with CPAP       Diet: NPO for Medical/Clinical Reasons Except for: Ice Chips    DVT Prophylaxis: Pneumatic Compression Devices  Hill Catheter: Not present  Lines: None     Cardiac Monitoring: ACTIVE order. Indication: bleeding  Code Status: Full Code      Clinically Significant Risk Factors Present on Admission                  # Hypertension: Home medication list includes antihypertensive(s)      # Obesity: Estimated body mass index is 30.82 kg/m  as calculated from the following:    Height as of this encounter: 1.905 m (6' 3\").    Weight as of this encounter: 111.9 kg (246 lb 9.6 oz).            Disposition Plan      Expected Discharge Date: 06/14/2023      Destination: home;home with help/services            Gucci Hall DO  Hospitalist Service  [facility]  Securely message with [employer] (more info)  Text page via [facility] Paging/Directory   ______________________________________________________________________    Interval History   No new events overnight.  Patient reports no bowel movement for 5 days.  Had 2 roast beef sandwiches on the morning of admission on 6/12/23.   He denies nausea, vomiting. Continues to have gross hematuria with pink/red discoloration of urine.  complains of pain over coccyx.     Physical Exam   Vital Signs: Temp: 98.6  F (37  C) Temp src: " Axillary BP: (!) 143/76 Pulse: 74   Resp: 26 SpO2: 95 % O2 Device: Nasal cannula Oxygen Delivery: 2 LPM  Weight: 246 lbs 9.6 oz    GENERAL: The patient is not in any acute distressed. Awake and alert.  HEENT: Nonicteric sclerae, PERRLA, EOMI. Oropharynx clear. Moist mucous membranes. Conjunctivae appear well perfused.  HEART: Regular rate and rhythm without murmurs.  LUNGS: Clear to auscultation bilaterally. No wheezing or crackles.  ABDOMEN: Soft, positive bowel sounds, tender.  SKIN: No rash, no excessive bruising, petechiae, or purpura.  EXTREMITIES : no rashes, no swelling in legs.  NEUROLOGIC: conscious and oriented, follows commands, no obvious focal deficits.    Medical Decision Making     50 MINUTES SPENT BY ME on the date of service doing chart review, history, exam, documentation & further activities per the note.      Data     I have personally reviewed the following data over the past 24 hrs:    6.3  \   8.7 (L)   / 162     143 102 42 (H) /  115   5.2 (H) 33 (H) 2.14 (H) \       ALT: 11 AST: 19 AP: 59 TBILI: 0.5   ALB: 3.5 TOT PROTEIN: 6.6 LIPASE: N/A       Procal: N/A CRP: N/A Lactic Acid: 1.1         Imaging results reviewed over the past 24 hrs:   Recent Results (from the past 24 hour(s))   Head CT w/o contrast    Narrative    EXAM: CT HEAD W/O CONTRAST  LOCATION: [facility]  DATE/TIME: 6/12/2023 2:56 PM CDT    INDICATION: fall  COMPARISON: 11/02/2022  TECHNIQUE: Routine CT Head without IV contrast. Multiplanar reformats. Dose reduction techniques were used.    FINDINGS:  INTRACRANIAL CONTENTS: No intracranial hemorrhage, extraaxial collection, or mass effect.  No CT evidence of acute infarct. Mild presumed chronic small vessel ischemic changes. Mild generalized volume loss. No hydrocephalus.     VISUALIZED ORBITS/SINUSES/MASTOIDS: Prior bilateral cataract surgery. Visualized portions of the orbits are otherwise unremarkable. No paranasal sinus mucosal disease. No middle ear  or mastoid effusion.    BONES/SOFT TISSUES: No acute abnormality.      Impression    IMPRESSION:  1.  No CT evidence for acute intracranial process.  2.  Brain atrophy and presumed chronic microvascular ischemic changes as above.   CT Chest Abdomen Pelvis w/o Contrast    Narrative    EXAM: CT CHEST ABDOMEN PELVIS W/O CONTRAST  LOCATION: [facility]  DATE/TIME: 6/12/2023 2:59 PM CDT    INDICATION: abd pain, palpable hernia midline ventral, some chronic back pain known renal mass with hematuria, Oncology has also ordered this study for 2 days from now  COMPARISON: CT chest 09/04/2019  TECHNIQUE: CT scan of the chest, abdomen, and pelvis was performed without IV contrast. Multiplanar reformats were obtained. Dose reduction techniques were used.   CONTRAST: None.    FINDINGS:   LUNGS AND PLEURA: The central airways are clear. Mild bronchial wall thickening. Similar severe bullous emphysematous changes. Multiple new bilateral multilobar pulmonary nodules. This includes a newly visualized 10 x 12 mm solid right lower lobe nodule   image 202 series 4. Stable 6 mm right lower lobe nodule measuring 12. New 7 mm left lower lobe nodule image 114. New 12 mm nodule image 196. Multiple additional nodules. No pleural effusion.    MEDIASTINUM/AXILLAE: Bilateral gynecomastia. Diffuse chest wall edema. Stable benign 1.9 cm cyst adjacent to the right first costochondral joint, perhaps a synovial cyst. No thoracic adenopathy. Normal heart size and no pericardial effusion. Dilated   central pulmonary arteries suggesting pulmonary artery hypertension. Stable dilated esophagus which could reflect achalasia.    CORONARY ARTERY CALCIFICATION: Mild.    HEPATOBILIARY: Normal.    PANCREAS: Normal.    SPLEEN: Normal.    ADRENAL GLANDS: Stable left adrenal gland thickening.    KIDNEYS/BLADDER: Limited evaluation due to the lack of contrast. Masslike enlargement of the mid to upper right kidney with effacement of the  hilum. This masslike area measures 8.3 x 8.2 x 6.0 cm. 2.4 cm right mid renal cyst. No follow-up is indicated.   No hydronephrosis or hydroureter.    BOWEL: Normal caliber small bowel and colon. Apparent anastomotic suture line within a colonic segment in the mid abdomen. Post ventral abdominal wall hernia repair with diastases recti and anterior bulging of intra-abdominal contents below the mesh   material. Associated 1.0 x 3.2 cm fluid collection below the mesh material, likely a chronic seroma. Small fat-containing hernia above the mesh material. Mild surrounding fat stranding is likely chronic. Tiny right inguinal hernia containing fat and   trace fluid. No free air.    LYMPH NODES: No thoracic adenopathy.    VASCULATURE: Mild to moderate aortoiliac atherosclerosis.    PELVIC ORGANS: Moderate prostatomegaly. Presacral edema. No pelvic free fluid.    MUSCULOSKELETAL: Spinal and pelvic degenerative changes. L4-L5 laminectomy and fusion. No definite suspicious osseous lesion.      Impression    IMPRESSION:  1.  Masslike enlargement of the right mid to upper kidney highly suspicious for a primary renal cell carcinoma. Note that evaluation of the kidneys is limited due to the lack of contrast.  2.  Multiple bilateral pulmonary nodules likely representing pulmonary metastases.  3.  Spinal and pelvic degenerative changes. No definite suspicious osseous lesion.  4.   Post mid ventral abdominal hernia repair with a small fat-containing hernia above the mesh material. A small presumed chronic seroma is noted below the mesh material. Diastases recti and anterior bulging of the mesh material are also noted.   XR Thoracic Spine 2 Views    Narrative    EXAM: XR THORACIC SPINE 2 VIEWS, XR LUMBAR SPINE 2/3 VIEWS  LOCATION: [facility]  DATE: 6/12/2023    INDICATION: acute low backache  COMPARISON: None.      Impression    IMPRESSION:   Thoracic spine:  Mild thoracic kyphosis. The vertebral bodies of the  thoracic spine otherwise have normal stature and alignment without evidence of compression fracture. Anterior marginal osteophytes within the mid and lower thoracic spine. Multilevel degenerative disc   disease of the lumbar spine with disc height loss, most pronounced within the mid and lower thoracic spine. The partially imaged lungs are unremarkable. Soft tissues unremarkable.    Lumbar spine:  5 lumbar-type vertebral bodies. Postsurgical changes posterior fusion at L4/L5. No hardware complication. Anterolisthesis of L4 and L5 measuring 5 mm. Multilevel degenerative disc disease of the lumbar lumbar spine with disc height loss, most pronounced   at L4/L5 and L5/S1. Anterior marginal osteophytes at L4/L5 and L5/S1. Atherosclerotic vascular disease. Soft tissues otherwise unremarkable.      XR Lumbar Spine 2/3 Views    Narrative    EXAM: XR THORACIC SPINE 2 VIEWS, XR LUMBAR SPINE 2/3 VIEWS  LOCATION: [facility]  DATE: 6/12/2023    INDICATION: acute low backache  COMPARISON: None.      Impression    IMPRESSION:   Thoracic spine:  Mild thoracic kyphosis. The vertebral bodies of the thoracic spine otherwise have normal stature and alignment without evidence of compression fracture. Anterior marginal osteophytes within the mid and lower thoracic spine. Multilevel degenerative disc   disease of the lumbar spine with disc height loss, most pronounced within the mid and lower thoracic spine. The partially imaged lungs are unremarkable. Soft tissues unremarkable.    Lumbar spine:  5 lumbar-type vertebral bodies. Postsurgical changes posterior fusion at L4/L5. No hardware complication. Anterolisthesis of L4 and L5 measuring 5 mm. Multilevel degenerative disc disease of the lumbar lumbar spine with disc height loss, most pronounced   at L4/L5 and L5/S1. Anterior marginal osteophytes at L4/L5 and L5/S1. Atherosclerotic vascular disease. Soft tissues otherwise unremarkable.

## 2023-06-13 NOTE — PROGRESS NOTES
"PRIMARY DIAGNOSIS: \"GENERIC\" NURSING  OUTPATIENT/OBSERVATION GOALS TO BE MET BEFORE DISCHARGE:  1. ADLs back to baseline: Yes    2. Activity and level of assistance: Up with standby assistance.    3. Pain status: Improved-controlled with oral pain medications.    4. Return to near baseline physical activity: No; generalized weakness     Discharge Planner Nurse   Safe discharge environment identified: No  Barriers to discharge: Yes; awaiting medical clearance.        Entered by: Clari Jean RN 06/12/2023 8:50 PM     Please review provider order for any additional goals.   Nurse to notify provider when observation goals have been met and patient is ready for discharge.    Pt alert and oriented x 4. VSS with exception of elevated bp. Denies SOB, chest pain and N/V. Will continue to monitor and intervene as needed. Lung sounds clear. AVILA. On 2 L/min NC per baseline; sat in upper 90s. Reports chronic pain to back, shoulder, wrist and leg; PRN percocet given; slightly effective relief. Voiding spontaneously in urinal. Alarms on for safety. Calls appropriately.   "

## 2023-06-13 NOTE — PLAN OF CARE
Goal Outcome Evaluation:  Pt is alert & pleasant & able to make needs known. He has chronic pain in addition to his current symptoms & has gotten no relief thus far from the prn's we have been able to provide. He was given news from the Oncologist that he likely has stage 4 cancer. This was a bit of a shock to the pt and the family member who was on speaker phone at the time. Pt's tele is NSR. Report given to oncoming Rn

## 2023-06-13 NOTE — UTILIZATION REVIEW
Inpatient appropriate  Admission Status; Secondary Review Determination     Under the authority of the Utilization Management Committee, the utilization review process indicated a secondary review on the above patient. The review outcome is based on review of the medical records, discussions with staff, and applying clinical experience noted on the date of the review.     (x) Inpatient Status Appropriate - This patient's medical care is consistent with medical management for inpatient care and reasonable inpatient medical practice.     RATIONALE FOR DETERMINATION   84-year-old male brought to the ED by ambulance from 6/12/2023 for evaluation after a fall, laying for an unknown amount of time and complaining of low back and tailbone pain.  Past medical history of type 2 diabetes mellitus, CKD, COPD, chronic respiratory failure with hypoxia and hypercapnia, hypertension, obesity, constipation, sleep apnea.  CT head without evidence for acute intracranial process.  X-ray of thoracic and lumbar spine without evidence of compression fracture.  CT chest, abdomen and pelvis showed masslike enlargement of the right mid to upper kidney highly suspicious for primary renal cell carcinoma, multiple bilateral pulmonary nodules likely representing metastasis; a small presumed chronic seroma is noted below the mesh material, diastases recti and anterior bulging of the mesh material.  Laboratory work-up remarkable for carbon dioxide 37, BUN 44, creatinine 2.42 (baseline 1.5-1.7), hemoglobin 8.6, UA remarkable for RBC more than 182.  Patient reported no bowel movement in 5 days and ongoing hematuria.  Previous right kidney mass ultrasound core biopsy on 8/25/2022 showed renal oncocytic tumor, histologic grade 2.  Patient was admitted for IV hydration in the setting of ELISABETH on CKD and generalized weakness.  Evaluated by general surgery service for chronic medical abdominal pain and chronic abdominal wall seroma and recommended no  surgical intervention.  Evaluated by heme-onc, recommended CT-guided biopsy of pulmonary nodules.  At the time of admission with the information available to the attending physician more than 2 nights Hospital complex care was anticipated, based on patient risk of adverse outcome if treated as outpatient and complex care required. Inpatient admission is appropriate based on the Medicare guidelines.     This document was produced using voice recognition software     The information on this document is developed by the utilization review team in order for the business office to ensure compliance. This only denotes the appropriateness of proper admission status and does not reflect the quality of care rendered.   The definitions of Inpatient Status and Observation Status used in making the determination above are those provided in the CMS Coverage Manual, Chapter 1 and Chapter 6, section 70.4.     Sincerely,     Silverio Grene MD  [facility]  Utilization Review Physician Advisor  Pager: [phone]

## 2023-06-13 NOTE — CONSULTS
Right lower lobe nodule accessible for biopsy. Estimated risk of pneumothorax 35%. Because of emphysema, any chest tube insertion could be prolonged. If risks acceptable can proceed

## 2023-06-13 NOTE — PROGRESS NOTES
Care Management Follow Up    Length of Stay (days): 0    Expected Discharge Date: 06/14/2023     Concerns to be Addressed:  discharge planning, care progression       Patient plan of care discussed at interdisciplinary rounds: No    Anticipated Discharge Disposition: Home  Disposition Comments: Anticipate return home with added HC services for PT/HHA. Friend will transport (and bring portable 02).  Anticipated Discharge Services:  (agreeable to HC services for PT/HHA)  Anticipated Discharge DME:  Per Therapy    Patient/family educated on Medicare website which has current facility and service quality ratings: yes (agreeable to HC services for PT- referral sent)  Education Provided on the Discharge Plan:    Patient/Family in Agreement with the Plan:      Referrals Placed by CM/SW: Homecare  Private pay costs discussed: Not applicable    Additional Information:  Chart reviewed.  Pt admitted after a fall.  Work-up in progress, including consults by Surgery, Urology, and Oncology.  Oncology plans CT guided biopsy of right lung lesion.      Pt has been accepted by [facility] for PT and HHA, with start of care date 6/15 or 6/16//2023.    Care Management will continue to follow.    Norma Henry RN

## 2023-06-13 NOTE — CONSULTS
"General Surgery Consultation  Soto Gibbs MRN# [id number]   Age/Sex: 83 year old male YOB: 1940     Reason for consult: 1. Fall, initial encounter    2. Generalized muscle weakness    3. Symptomatic anemia    4. ELISABETH (acute kidney injury) (H)            Requesting physician: Tyler Alfaro MD                   Assessment and Plan:   Assessment:  Mr. Gibbs is a 84 yo M with h/o HTN, CKD, right renal mass, COPD on supplemental O2, previous ventral hernia repair with mesh who presented to the Emergency room on 6/12 following a mechanical fall. Patient with chronic abdominal pain in the mid abdomen in setting of prior ventral hernia for which surgery is consulted. Given his history and symptoms and non-obstructed bowel, likely a chronic abdominal wall seroma, no need for surgical intervention at this time.      Plan:  - No surgical intervention indicated at this time   - Discussed with patient that if he wishes to pursue follow-up with a hernia specialist in the future, we can place a referral for this.            Chief Complaint:     Chief Complaint   Patient presents with     Fall     Back Pain        History is obtained from the patient    HPI:   Soto Gibbs is a 83 year old male with h/o HTN, CKD, right renal mass, COPD on supplemental O2, previous ventral hernia repair with mesh who presented to the Emergency room on 6/12 following a mechanical fall. Patient reports 4-5 month history of ongoing mid abdominal pain and back pain. He has been passing flatus and BM's. Reports his BM's are often sporadic, will go five days between having BM's. Endorses decreased appetite over the last several months, more of a \"grazer\" now and unintentional 40-50 lb weight loss in the last year. Denies fever, chills, nausea, vomiting.           Past Medical History:   No past medical history on file.           Past Surgical History:     Past Surgical History:   Procedure Laterality Date     ABDOMEN SURGERY      Hernia "     COLON SURGERY      Polyps.             Social History:    reports that he quit smoking about 38 years ago. He has never used smokeless tobacco. He reports that he does not drink alcohol and does not use drugs.           Family History:   No family history on file.           Allergies:     Allergies   Allergen Reactions     Morphine (Pf) [Morphine] Other (See Comments) and Dizziness     Hallucinations              Medications:     Prior to Admission medications    Medication Sig Start Date End Date Taking? Authorizing Provider   albuterol (PROVENTIL HFA;VENTOLIN HFA) 90 mcg/actuation inhaler [ALBUTEROL (PROVENTIL HFA;VENTOLIN HFA) 90 MCG/ACTUATION INHALER] Inhale 2 puffs every 6 (six) hours as needed for wheezing or shortness of breath. 2/24/17  Yes Caity Jung MD   albuterol (PROVENTIL) 2.5 mg /3 mL (0.083 %) nebulizer solution [ALBUTEROL (PROVENTIL) 2.5 MG /3 ML (0.083 %) NEBULIZER SOLUTION] Take 3 mL (2.5 mg total) by nebulization every 4 (four) hours as needed for shortness of breath. 3/2/17  Yes Juan Pineda MD   atenolol (TENORMIN) 25 MG tablet [ATENOLOL (TENORMIN) 25 MG TABLET] Take 1 tablet (25 mg total) by mouth every morning. 3/2/17  Yes Juan Pineda MD   ferrous sulfate (FEROSUL) 325 (65 Fe) MG tablet Take 325 mg by mouth daily (with breakfast)   Yes Unknown, Entered By History   gabapentin (NEURONTIN) 300 MG capsule Take 300 mg by mouth 2 times daily as needed for neuropathic pain   Yes Reported, Patient   hydrochlorothiazide (HYDRODIURIL) 25 MG tablet Take 25 mg by mouth daily   Yes Unknown, Entered By History   lisinopril (PRINIVIL,ZESTRIL) 20 MG tablet [LISINOPRIL (PRINIVIL,ZESTRIL) 20 MG TABLET] Take 20 mg by mouth every morning. 2/21/17  Yes Provider, Historical   oxyCODONE-acetaminophen (PERCOCET) 5-325 mg per tablet [OXYCODONE-ACETAMINOPHEN (PERCOCET) 5-325 MG PER TABLET] Take 1 tablet by mouth every 4 (four) hours as needed for pain. 8/12/16  Yes Provider, Historical   OXYGEN-AIR  "DELIVERY SYSTEMS MISC [OXYGEN-AIR DELIVERY SYSTEMS MISC] Inhale 2 L/min continuous. Indications: copd, sleep apnea 3/4/17  Yes Juan Pineda MD   simvastatin (ZOCOR) 20 MG tablet Take 20 mg by mouth every evening 8/12/16  Yes Provider, Historical   vitamin C with B complex (B COMPLEX-C) tablet Take 1 tablet by mouth daily   Yes Unknown, Entered By History              Review of Systems:   The Review of Systems is negative other than noted in the HPI            Physical Exam:     Patient Vitals for the past 24 hrs:   BP Temp Temp src Pulse Resp SpO2 Height Weight   06/13/23 0837 (!) 143/76 98.6  F (37  C) Axillary 74 26 95 % -- --   06/13/23 0530 -- -- -- -- -- 92 % -- 111.9 kg (246 lb 9.6 oz)   06/13/23 0515 -- -- -- -- -- (!) 82 % -- --   06/13/23 0314 -- -- -- -- -- 96 % -- --   06/13/23 0308 (!) 178/68 97.4  F (36.3  C) Oral 67 26 100 % -- --   06/12/23 2300 (!) 172/78 98.6  F (37  C) Oral 72 26 97 % -- --   06/12/23 1930 (!) 171/78 -- -- -- -- -- -- --   06/12/23 1926 (!) 195/85 98.7  F (37.1  C) Oral 75 26 96 % 1.905 m (6' 3\") 115.1 kg (253 lb 12 oz)   06/12/23 1800 (!) 154/66 -- -- 72 27 99 % -- --   06/12/23 1620 (!) 162/77 -- -- 65 10 100 % -- --   06/12/23 1600 (!) 158/73 -- -- 65 17 100 % -- --   06/12/23 1530 (!) 178/81 -- -- 62 18 98 % -- --   06/12/23 1430 (!) 160/67 -- -- 66 24 99 % -- --   06/12/23 1345 (!) 168/76 -- -- 71 24 98 % -- --   06/12/23 1338 (!) 170/74 -- -- 69 26 96 % -- --   06/12/23 1328 (!) 170/74 98.2  F (36.8  C) Oral 69 22 97 % 1.905 m (6' 3\") 108.4 kg (239 lb)          Intake/Output Summary (Last 24 hours) at 6/13/2023 0953  Last data filed at 6/13/2023 0800  Gross per 24 hour   Intake 700.75 ml   Output 1425 ml   Net -724.25 ml      Constitutional:   awake, alert, cooperative, no apparent distress, and appears stated age       Eyes:   PERRL, conjunctiva/corneas clear, EOM's intact; no scleral edema or icterus noted        ENT:   Normocephalic, without obvious abnormality, " atraumatic, Lips, mucosa, and tongue normal        Lungs:   Normal respiratory effort, no accessory muscle use       Cardiovascular:   Regular rate and rhythm       Abdomen:   Soft, tender to palpation over epigastric region. No rebound or guarding. Well healed inferior midline incision.       Musculoskeletal:   No obvious swelling, bruising or deformity       Skin:   Skin color and texture normal for patient, no rashes or lesions              Data:         All imaging studies reviewed by me.    Results for orders placed or performed during the hospital encounter of 06/12/23 (from the past 24 hour(s))   ECG 12-Lead with [facility] - [facility]   Result Value Ref Range    Systolic Blood Pressure  mmHg    Diastolic Blood Pressure  mmHg    Ventricular Rate 70 BPM    Atrial Rate 70 BPM    OH Interval 154 ms    QRS Duration 100 ms     ms    QTc 390 ms    P Axis 63 degrees    R AXIS 30 degrees    T Axis 73 degrees    Interpretation ECG       Sinus rhythm  Possible Left atrial enlargement  Septal infarct , age undetermined  Abnormal ECG  When compared with ECG of 06-SEP-2022 03:43,  No significant change was found  Confirmed by SEE ED PROVIDER NOTE FOR, ECG INTERPRETATION ([id number]),  Helene Dinero ([id number]) on 6/12/2023 7:51:42 PM     CBC with platelets + differential    Narrative    The following orders were created for panel order CBC with platelets + differential.  Procedure                               Abnormality         Status                     ---------                               -----------         ------                     CBC with platelets and d...[[id number]]  Abnormal            Final result                 Please view results for these tests on the individual orders.   Basic metabolic panel   Result Value Ref Range    Sodium 143 136 - 145 mmol/L    Potassium 4.1 3.5 - 5.0 mmol/L    Chloride 99 98 - 107 mmol/L    Carbon Dioxide (CO2) 37 (H) 22 - 31 mmol/L    Anion Gap 7 5 - 18 mmol/L    Urea Nitrogen 44  (H) 8 - 28 mg/dL    Creatinine 2.42 (H) 0.70 - 1.30 mg/dL    Calcium 8.8 8.5 - 10.5 mg/dL    Glucose 138 (H) 70 - 125 mg/dL    GFR Estimate 26 (L) >60 mL/min/1.73m2   Troponin I (now)   Result Value Ref Range    Troponin I 0.02 0.00 - 0.29 ng/mL   ABO/Rh type and screen    Narrative    The following orders were created for panel order ABO/Rh type and screen.  Procedure                               Abnormality         Status                     ---------                               -----------         ------                     Adult Type and Screen[[id number]]                            Final result                 Please view results for these tests on the individual orders.   Lactic acid whole blood   Result Value Ref Range    Lactic Acid 1.1 0.7 - 2.0 mmol/L   CBC with platelets and differential   Result Value Ref Range    WBC Count 7.0 4.0 - 11.0 10e3/uL    RBC Count 3.02 (L) 4.40 - 5.90 10e6/uL    Hemoglobin 8.6 (L) 13.3 - 17.7 g/dL    Hematocrit 30.3 (L) 40.0 - 53.0 %     78 - 100 fL    MCH 28.5 26.5 - 33.0 pg    MCHC 28.4 (L) 31.5 - 36.5 g/dL    RDW 15.3 (H) 10.0 - 15.0 %    Platelet Count 178 150 - 450 10e3/uL    % Neutrophils 64 %    % Lymphocytes 24 %    % Monocytes 11 %    % Eosinophils 0 %    % Basophils 0 %    % Immature Granulocytes 1 %    NRBCs per 100 WBC 0 <1 /100    Absolute Neutrophils 4.5 1.6 - 8.3 10e3/uL    Absolute Lymphocytes 1.7 0.8 - 5.3 10e3/uL    Absolute Monocytes 0.8 0.0 - 1.3 10e3/uL    Absolute Eosinophils 0.0 0.0 - 0.7 10e3/uL    Absolute Basophils 0.0 0.0 - 0.2 10e3/uL    Absolute Immature Granulocytes 0.0 <=0.4 10e3/uL    Absolute NRBCs 0.0 10e3/uL   Adult Type and Screen   Result Value Ref Range    ABO/RH(D) A POS     Antibody Screen Negative Negative    SPECIMEN EXPIRATION DATE [id number]    Extra Tube    Narrative    The following orders were created for panel order Extra Tube.  Procedure                               Abnormality         Status                      ---------                               -----------         ------                     Extra Blue Top Tube[[id number]]                              Final result                 Please view results for these tests on the individual orders.   Extra Blue Top Tube   Result Value Ref Range    Hold Specimen JIC    CK total   Result Value Ref Range     30 - 190 U/L   Head CT w/o contrast    Narrative    EXAM: CT HEAD W/O CONTRAST  LOCATION: [facility]  DATE/TIME: 6/12/2023 2:56 PM CDT    INDICATION: fall  COMPARISON: 11/02/2022  TECHNIQUE: Routine CT Head without IV contrast. Multiplanar reformats. Dose reduction techniques were used.    FINDINGS:  INTRACRANIAL CONTENTS: No intracranial hemorrhage, extraaxial collection, or mass effect.  No CT evidence of acute infarct. Mild presumed chronic small vessel ischemic changes. Mild generalized volume loss. No hydrocephalus.     VISUALIZED ORBITS/SINUSES/MASTOIDS: Prior bilateral cataract surgery. Visualized portions of the orbits are otherwise unremarkable. No paranasal sinus mucosal disease. No middle ear or mastoid effusion.    BONES/SOFT TISSUES: No acute abnormality.      Impression    IMPRESSION:  1.  No CT evidence for acute intracranial process.  2.  Brain atrophy and presumed chronic microvascular ischemic changes as above.   CT Chest Abdomen Pelvis w/o Contrast    Narrative    EXAM: CT CHEST ABDOMEN PELVIS W/O CONTRAST  LOCATION: [facility]  DATE/TIME: 6/12/2023 2:59 PM CDT    INDICATION: abd pain, palpable hernia midline ventral, some chronic back pain known renal mass with hematuria, Oncology has also ordered this study for 2 days from now  COMPARISON: CT chest 09/04/2019  TECHNIQUE: CT scan of the chest, abdomen, and pelvis was performed without IV contrast. Multiplanar reformats were obtained. Dose reduction techniques were used.   CONTRAST: None.    FINDINGS:   LUNGS AND PLEURA: The central airways are  clear. Mild bronchial wall thickening. Similar severe bullous emphysematous changes. Multiple new bilateral multilobar pulmonary nodules. This includes a newly visualized 10 x 12 mm solid right lower lobe nodule   image 202 series 4. Stable 6 mm right lower lobe nodule measuring 12. New 7 mm left lower lobe nodule image 114. New 12 mm nodule image 196. Multiple additional nodules. No pleural effusion.    MEDIASTINUM/AXILLAE: Bilateral gynecomastia. Diffuse chest wall edema. Stable benign 1.9 cm cyst adjacent to the right first costochondral joint, perhaps a synovial cyst. No thoracic adenopathy. Normal heart size and no pericardial effusion. Dilated   central pulmonary arteries suggesting pulmonary artery hypertension. Stable dilated esophagus which could reflect achalasia.    CORONARY ARTERY CALCIFICATION: Mild.    HEPATOBILIARY: Normal.    PANCREAS: Normal.    SPLEEN: Normal.    ADRENAL GLANDS: Stable left adrenal gland thickening.    KIDNEYS/BLADDER: Limited evaluation due to the lack of contrast. Masslike enlargement of the mid to upper right kidney with effacement of the hilum. This masslike area measures 8.3 x 8.2 x 6.0 cm. 2.4 cm right mid renal cyst. No follow-up is indicated.   No hydronephrosis or hydroureter.    BOWEL: Normal caliber small bowel and colon. Apparent anastomotic suture line within a colonic segment in the mid abdomen. Post ventral abdominal wall hernia repair with diastases recti and anterior bulging of intra-abdominal contents below the mesh   material. Associated 1.0 x 3.2 cm fluid collection below the mesh material, likely a chronic seroma. Small fat-containing hernia above the mesh material. Mild surrounding fat stranding is likely chronic. Tiny right inguinal hernia containing fat and   trace fluid. No free air.    LYMPH NODES: No thoracic adenopathy.    VASCULATURE: Mild to moderate aortoiliac atherosclerosis.    PELVIC ORGANS: Moderate prostatomegaly. Presacral edema. No pelvic free  fluid.    MUSCULOSKELETAL: Spinal and pelvic degenerative changes. L4-L5 laminectomy and fusion. No definite suspicious osseous lesion.      Impression    IMPRESSION:  1.  Masslike enlargement of the right mid to upper kidney highly suspicious for a primary renal cell carcinoma. Note that evaluation of the kidneys is limited due to the lack of contrast.  2.  Multiple bilateral pulmonary nodules likely representing pulmonary metastases.  3.  Spinal and pelvic degenerative changes. No definite suspicious osseous lesion.  4.   Post mid ventral abdominal hernia repair with a small fat-containing hernia above the mesh material. A small presumed chronic seroma is noted below the mesh material. Diastases recti and anterior bulging of the mesh material are also noted.   UA with Microscopic reflex to Culture    Specimen: Urine, Clean Catch   Result Value Ref Range    Color Urine Light Orange (A) Colorless, Straw, Light Yellow, Yellow    Appearance Urine Cloudy (A) Clear    Glucose Urine Negative Negative mg/dL    Bilirubin Urine Negative Negative    Ketones Urine Negative Negative mg/dL    Specific Gravity Urine 1.013 1.001 - 1.030    Blood Urine >1.0 mg/dL (A) Negative    pH Urine 5.5 5.0 - 7.0    Protein Albumin Urine 100 (A) Negative mg/dL    Urobilinogen Urine <2.0 <2.0 mg/dL    Nitrite Urine Negative Negative    Leukocyte Esterase Urine 25 Virgilio/uL (A) Negative    Mucus Urine Present (A) None Seen /LPF    RBC Urine >182 (H) <=2 /HPF    WBC Urine 0 <=5 /HPF    Narrative    Urine Culture not indicated   XR Thoracic Spine 2 Views    Narrative    EXAM: XR THORACIC SPINE 2 VIEWS, XR LUMBAR SPINE 2/3 VIEWS  LOCATION: [facility]  DATE: 6/12/2023    INDICATION: acute low backache  COMPARISON: None.      Impression    IMPRESSION:   Thoracic spine:  Mild thoracic kyphosis. The vertebral bodies of the thoracic spine otherwise have normal stature and alignment without evidence of compression fracture.  Anterior marginal osteophytes within the mid and lower thoracic spine. Multilevel degenerative disc   disease of the lumbar spine with disc height loss, most pronounced within the mid and lower thoracic spine. The partially imaged lungs are unremarkable. Soft tissues unremarkable.    Lumbar spine:  5 lumbar-type vertebral bodies. Postsurgical changes posterior fusion at L4/L5. No hardware complication. Anterolisthesis of L4 and L5 measuring 5 mm. Multilevel degenerative disc disease of the lumbar lumbar spine with disc height loss, most pronounced   at L4/L5 and L5/S1. Anterior marginal osteophytes at L4/L5 and L5/S1. Atherosclerotic vascular disease. Soft tissues otherwise unremarkable.      XR Lumbar Spine 2/3 Views    Narrative    EXAM: XR THORACIC SPINE 2 VIEWS, XR LUMBAR SPINE 2/3 VIEWS  LOCATION: [facility]  DATE: 6/12/2023    INDICATION: acute low backache  COMPARISON: None.      Impression    IMPRESSION:   Thoracic spine:  Mild thoracic kyphosis. The vertebral bodies of the thoracic spine otherwise have normal stature and alignment without evidence of compression fracture. Anterior marginal osteophytes within the mid and lower thoracic spine. Multilevel degenerative disc   disease of the lumbar spine with disc height loss, most pronounced within the mid and lower thoracic spine. The partially imaged lungs are unremarkable. Soft tissues unremarkable.    Lumbar spine:  5 lumbar-type vertebral bodies. Postsurgical changes posterior fusion at L4/L5. No hardware complication. Anterolisthesis of L4 and L5 measuring 5 mm. Multilevel degenerative disc disease of the lumbar lumbar spine with disc height loss, most pronounced   at L4/L5 and L5/S1. Anterior marginal osteophytes at L4/L5 and L5/S1. Atherosclerotic vascular disease. Soft tissues otherwise unremarkable.      CBC with platelets differential    Narrative    The following orders were created for panel order CBC with platelets  differential.  Procedure                               Abnormality         Status                     ---------                               -----------         ------                     CBC with platelets and d...[[id number]]  Abnormal            Final result                 Please view results for these tests on the individual orders.   Comprehensive metabolic panel   Result Value Ref Range    Sodium 143 136 - 145 mmol/L    Potassium 5.2 (H) 3.5 - 5.0 mmol/L    Chloride 102 98 - 107 mmol/L    Carbon Dioxide (CO2) 33 (H) 22 - 31 mmol/L    Anion Gap 8 5 - 18 mmol/L    Urea Nitrogen 42 (H) 8 - 28 mg/dL    Creatinine 2.14 (H) 0.70 - 1.30 mg/dL    Calcium 9.0 8.5 - 10.5 mg/dL    Glucose 115 70 - 125 mg/dL    Alkaline Phosphatase 59 45 - 120 U/L    AST 19 0 - 40 U/L    ALT 11 0 - 45 U/L    Protein Total 6.6 6.0 - 8.0 g/dL    Albumin 3.5 3.5 - 5.0 g/dL    Bilirubin Total 0.5 0.0 - 1.0 mg/dL    GFR Estimate 30 (L) >60 mL/min/1.73m2   CBC with platelets and differential   Result Value Ref Range    WBC Count 6.3 4.0 - 11.0 10e3/uL    RBC Count 3.05 (L) 4.40 - 5.90 10e6/uL    Hemoglobin 8.7 (L) 13.3 - 17.7 g/dL    Hematocrit 29.7 (L) 40.0 - 53.0 %    MCV 97 78 - 100 fL    MCH 28.5 26.5 - 33.0 pg    MCHC 29.3 (L) 31.5 - 36.5 g/dL    RDW 15.4 (H) 10.0 - 15.0 %    Platelet Count 162 150 - 450 10e3/uL    % Neutrophils 63 %    % Lymphocytes 26 %    % Monocytes 9 %    % Eosinophils 1 %    % Basophils 0 %    % Immature Granulocytes 1 %    NRBCs per 100 WBC 0 <1 /100    Absolute Neutrophils 4.0 1.6 - 8.3 10e3/uL    Absolute Lymphocytes 1.6 0.8 - 5.3 10e3/uL    Absolute Monocytes 0.6 0.0 - 1.3 10e3/uL    Absolute Eosinophils 0.0 0.0 - 0.7 10e3/uL    Absolute Basophils 0.0 0.0 - 0.2 10e3/uL    Absolute Immature Granulocytes 0.1 <=0.4 10e3/uL    Absolute NRBCs 0.0 10e3/uL           Frida Bailey PA-C  [facility]  [address] [place] [id number]

## 2023-06-13 NOTE — CONSULTS
[facility] Hematology and Oncology Inpatient Consult Note    Patient: Soto Gibbs  MRN: [id number]  Date of Service: 6/13/2023      Reason for Visit    Metastatic renal cell carcinoma  COPD    Assessment    1.  A very pleasant 83-year-old gentleman brought in with incredibly complex medical issues.  He appears to have metastatic renal cell carcinoma.  He has not developed pulmonary metastases.  He has a large mass on his right kidney which is giving him some hematuria.  2.  Pulmonary metastases most likely from renal cell carcinoma although they have not been biopsied yet.  3.  Mild renal insufficiency which seems to be getting worse.  4.  Moderately severe anemia which also seems to be getting worse.  5.  Declining performance status.  Which hampers and limits our options.    MEDICAL DECISION MAKING:    I had lengthy discussion with the patient.  Reviewed his case with Dr. Chacon who is covering oncologist here in our clinic.  Discussed with the patient.  Reviewed his CT scan with him.  He clearly has developed metastatic disease which makes surgical resection of his renal cell carcinoma somewhat of a moot point.    Discussed with the patient about what he wants to do.  Asked him to think it over.  I think the treatment from this point onward is going to be palliative in nature.  This is not a curative entity anymore.  He has multiple renal metastases.  In addition he is not a candidate for multiple pulmonary resections.    We will try to see if we can get a biopsy of one of his pulmonary lesions to see if we can confirm that indeed it is metastatic renal cell carcinoma and nothing else.  The clinical appearance is very much suggestive of renal cell carcinoma with cannonball metastases.    In the meantime we will also continue to have some supportive care.  His overall prognosis is guarded because of his multiple medical issues.    Advised him to keep his appointment with Dr. Nuñez on 20 June 2023.  I  will also follow-up with him in the clinic as well.    If he can get a CT scan guided biopsy of his lung lesion that will be swell.  Otherwise that can be done as an outpatient as well.  I have put in the orders for a CT-guided biopsy.  We will see if that can be done.    He should follow-up with me in the clinic after his discharge.    Staging History    Stage IV clinically    History  Mr. Soto Gibbs is a 83 year old  -American gentleman who was diagnosed with renal mass when he had a CT scan of the chest done for some pulmonary symptom in March 2022.  The CT scan showed some chronic emphysema but showed about 5.1 cm mass in the upper pole of the right kidney.  This was subsequently evaluated with the help of MRI which confirmed this to be a mass consistent with RCC.  He was then evaluated by urology Dr. Nuñez in May 2022.  Dr. Nuñez recommended that we should get all the scans together and perhaps get a biopsy.     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  There was no sarcomatoid component noted.  He then had a subsequent CT scan done in July 2022.  The mass had increased in size from 5.1 to 5.8 cm in size.     He had another opinion from urology from Dr. Myers from [facility] urology which was looks like a virtual or phone visit.  He was recommended to consider observation because of his advanced age and comorbidities.     In June 2023 he again started having some hematuria.  He went and saw Dr. Lloyd his primary care physician.  Dr. Lloyd asked him to come and see us.  He was seen in June 2023.  After review of his data it was  decided that he should get another CT scan and try to get an appointment with urology.  He has an appointment to see Dr. Nuñez on 20 June 2023.    However in the meantime he came into the hospital after sustaining a fall.  He had work-up done in the emergency room.  That included another CT scan of the chest abdomen and  "pelvis.  Unfortunately he appears to have developed pulmonary metastases.    He is recuperating in the hospital.  Getting slowly better.  Still quite weak.  He continues to have anemia.       Review of systems.    A 14 point review of systems was obtained.  Positive findings noted in the history.  Rest of the review of system is otherwise negative.      Past History  No past medical history on file.  Past Surgical History:   Procedure Laterality Date     ABDOMEN SURGERY      Hernia     COLON SURGERY      Polyps.     No family history on file.  Social History     Socioeconomic History     Marital status: Single   Tobacco Use     Smoking status: Former     Types: Cigarettes     Quit date: 1985     Years since quittin.3     Smokeless tobacco: Never   Substance and Sexual Activity     Alcohol use: No     Drug use: No       Allergies    Allergies   Allergen Reactions     Morphine (Pf) [Morphine] Other (See Comments) and Dizziness     Hallucinations          Physical Exam    BP (!) 143/76 (BP Location: Right arm)   Pulse 74   Temp 98.6  F (37  C) (Axillary)   Resp 26   Ht 1.905 m (6' 3\")   Wt 111.9 kg (246 lb 9.6 oz)   SpO2 95%   BMI 30.82 kg/m      GENERAL: Alert and oriented to time place and person. In no distress but is weak..    HEAD: Atraumatic and normocephalic.    EYES: ORA, EOMI. No pallor. No icterus.    Oral cavity: no mucosal lesion or tonsillar enlargement.    NECK: supple. JVP normal.No thyroid enlargement.    LYMPH NODES: No palpable, cervical, axillary or inguinal lymphadenopathy.    CHEST: clear to auscultation bilaterally. Symmetrical breath movements bilaterally.  He has decreased air entry at the bases.  He does have some rhonchi.    CVS: S1 and S2 are Regular rate and rhythm.  He has systolic murmur.  This is peripheral edema.    ABDOMEN: Soft. Not tender. Not distended. No palpable hepatomegaly or splenomegaly. No other mass palpable. Bowel sounds heard.  He does have ventral " hernia.    EXTREMITIES: Warm.  Plus bilateral lower extremity edema.    NEUROLOGICAL: Preserved orientation.  Neuromuscular system intact.  Cranial nerve appears to be intact.  No cerebellar deficit apparent.    SKIN: no rash, or bruising or purpura.      Lab Results  Recent Results (from the past 24 hour(s))   ECG 12-Lead with MUSE - TOR,SHUKRI,MARYBETH    Collection Time: 06/12/23  1:30 PM   Result Value Ref Range    Systolic Blood Pressure  mmHg    Diastolic Blood Pressure  mmHg    Ventricular Rate 70 BPM    Atrial Rate 70 BPM    OH Interval 154 ms    QRS Duration 100 ms     ms    QTc 390 ms    P Axis 63 degrees    R AXIS 30 degrees    T Axis 73 degrees    Interpretation ECG       Sinus rhythm  Possible Left atrial enlargement  Septal infarct , age undetermined  Abnormal ECG  When compared with ECG of 06-SEP-2022 03:43,  No significant change was found  Confirmed by SEE ED PROVIDER NOTE FOR, ECG INTERPRETATION ([id number]),  Helene Dinero ([id number]) on 6/12/2023 7:51:42 PM     Basic metabolic panel    Collection Time: 06/12/23  2:19 PM   Result Value Ref Range    Sodium 143 136 - 145 mmol/L    Potassium 4.1 3.5 - 5.0 mmol/L    Chloride 99 98 - 107 mmol/L    Carbon Dioxide (CO2) 37 (H) 22 - 31 mmol/L    Anion Gap 7 5 - 18 mmol/L    Urea Nitrogen 44 (H) 8 - 28 mg/dL    Creatinine 2.42 (H) 0.70 - 1.30 mg/dL    Calcium 8.8 8.5 - 10.5 mg/dL    Glucose 138 (H) 70 - 125 mg/dL    GFR Estimate 26 (L) >60 mL/min/1.73m2   Troponin I (now)    Collection Time: 06/12/23  2:19 PM   Result Value Ref Range    Troponin I 0.02 0.00 - 0.29 ng/mL   Lactic acid whole blood    Collection Time: 06/12/23  2:19 PM   Result Value Ref Range    Lactic Acid 1.1 0.7 - 2.0 mmol/L   CBC with platelets and differential    Collection Time: 06/12/23  2:19 PM   Result Value Ref Range    WBC Count 7.0 4.0 - 11.0 10e3/uL    RBC Count 3.02 (L) 4.40 - 5.90 10e6/uL    Hemoglobin 8.6 (L) 13.3 - 17.7 g/dL    Hematocrit 30.3 (L) 40.0 - 53.0 %     78 - 100  fL    MCH 28.5 26.5 - 33.0 pg    MCHC 28.4 (L) 31.5 - 36.5 g/dL    RDW 15.3 (H) 10.0 - 15.0 %    Platelet Count 178 150 - 450 10e3/uL    % Neutrophils 64 %    % Lymphocytes 24 %    % Monocytes 11 %    % Eosinophils 0 %    % Basophils 0 %    % Immature Granulocytes 1 %    NRBCs per 100 WBC 0 <1 /100    Absolute Neutrophils 4.5 1.6 - 8.3 10e3/uL    Absolute Lymphocytes 1.7 0.8 - 5.3 10e3/uL    Absolute Monocytes 0.8 0.0 - 1.3 10e3/uL    Absolute Eosinophils 0.0 0.0 - 0.7 10e3/uL    Absolute Basophils 0.0 0.0 - 0.2 10e3/uL    Absolute Immature Granulocytes 0.0 <=0.4 10e3/uL    Absolute NRBCs 0.0 10e3/uL   Adult Type and Screen    Collection Time: 06/12/23  2:19 PM   Result Value Ref Range    ABO/RH(D) A POS     Antibody Screen Negative Negative    SPECIMEN EXPIRATION DATE [id number]    Extra Blue Top Tube    Collection Time: 06/12/23  2:19 PM   Result Value Ref Range    Hold Specimen JIC    CK total    Collection Time: 06/12/23  2:19 PM   Result Value Ref Range     30 - 190 U/L   UA with Microscopic reflex to Culture    Collection Time: 06/12/23  3:34 PM    Specimen: Urine, Clean Catch   Result Value Ref Range    Color Urine Light Orange (A) Colorless, Straw, Light Yellow, Yellow    Appearance Urine Cloudy (A) Clear    Glucose Urine Negative Negative mg/dL    Bilirubin Urine Negative Negative    Ketones Urine Negative Negative mg/dL    Specific Gravity Urine 1.013 1.001 - 1.030    Blood Urine >1.0 mg/dL (A) Negative    pH Urine 5.5 5.0 - 7.0    Protein Albumin Urine 100 (A) Negative mg/dL    Urobilinogen Urine <2.0 <2.0 mg/dL    Nitrite Urine Negative Negative    Leukocyte Esterase Urine 25 Virgilio/uL (A) Negative    Mucus Urine Present (A) None Seen /LPF    RBC Urine >182 (H) <=2 /HPF    WBC Urine 0 <=5 /HPF   Comprehensive metabolic panel    Collection Time: 06/13/23  6:05 AM   Result Value Ref Range    Sodium 143 136 - 145 mmol/L    Potassium 5.2 (H) 3.5 - 5.0 mmol/L    Chloride 102 98 - 107 mmol/L    Carbon  Dioxide (CO2) 33 (H) 22 - 31 mmol/L    Anion Gap 8 5 - 18 mmol/L    Urea Nitrogen 42 (H) 8 - 28 mg/dL    Creatinine 2.14 (H) 0.70 - 1.30 mg/dL    Calcium 9.0 8.5 - 10.5 mg/dL    Glucose 115 70 - 125 mg/dL    Alkaline Phosphatase 59 45 - 120 U/L    AST 19 0 - 40 U/L    ALT 11 0 - 45 U/L    Protein Total 6.6 6.0 - 8.0 g/dL    Albumin 3.5 3.5 - 5.0 g/dL    Bilirubin Total 0.5 0.0 - 1.0 mg/dL    GFR Estimate 30 (L) >60 mL/min/1.73m2   CBC with platelets and differential    Collection Time: 06/13/23  6:05 AM   Result Value Ref Range    WBC Count 6.3 4.0 - 11.0 10e3/uL    RBC Count 3.05 (L) 4.40 - 5.90 10e6/uL    Hemoglobin 8.7 (L) 13.3 - 17.7 g/dL    Hematocrit 29.7 (L) 40.0 - 53.0 %    MCV 97 78 - 100 fL    MCH 28.5 26.5 - 33.0 pg    MCHC 29.3 (L) 31.5 - 36.5 g/dL    RDW 15.4 (H) 10.0 - 15.0 %    Platelet Count 162 150 - 450 10e3/uL    % Neutrophils 63 %    % Lymphocytes 26 %    % Monocytes 9 %    % Eosinophils 1 %    % Basophils 0 %    % Immature Granulocytes 1 %    NRBCs per 100 WBC 0 <1 /100    Absolute Neutrophils 4.0 1.6 - 8.3 10e3/uL    Absolute Lymphocytes 1.6 0.8 - 5.3 10e3/uL    Absolute Monocytes 0.6 0.0 - 1.3 10e3/uL    Absolute Eosinophils 0.0 0.0 - 0.7 10e3/uL    Absolute Basophils 0.0 0.0 - 0.2 10e3/uL    Absolute Immature Granulocytes 0.1 <=0.4 10e3/uL    Absolute NRBCs 0.0 10e3/uL        Imaging Results    XR Thoracic Spine 2 Views    Result Date: 6/12/2023  EXAM: XR THORACIC SPINE 2 VIEWS, XR LUMBAR SPINE 2/3 VIEWS LOCATION: [facility] DATE: 6/12/2023 INDICATION: acute low backache COMPARISON: None.     IMPRESSION: Thoracic spine: Mild thoracic kyphosis. The vertebral bodies of the thoracic spine otherwise have normal stature and alignment without evidence of compression fracture. Anterior marginal osteophytes within the mid and lower thoracic spine. Multilevel degenerative disc disease of the lumbar spine with disc height loss, most pronounced within the mid and lower thoracic  spine. The partially imaged lungs are unremarkable. Soft tissues unremarkable. Lumbar spine: 5 lumbar-type vertebral bodies. Postsurgical changes posterior fusion at L4/L5. No hardware complication. Anterolisthesis of L4 and L5 measuring 5 mm. Multilevel degenerative disc disease of the lumbar lumbar spine with disc height loss, most pronounced at L4/L5 and L5/S1. Anterior marginal osteophytes at L4/L5 and L5/S1. Atherosclerotic vascular disease. Soft tissues otherwise unremarkable.     XR Lumbar Spine 2/3 Views    Result Date: 6/12/2023  EXAM: XR THORACIC SPINE 2 VIEWS, XR LUMBAR SPINE 2/3 VIEWS LOCATION: [facility] DATE: 6/12/2023 INDICATION: acute low backache COMPARISON: None.     IMPRESSION: Thoracic spine: Mild thoracic kyphosis. The vertebral bodies of the thoracic spine otherwise have normal stature and alignment without evidence of compression fracture. Anterior marginal osteophytes within the mid and lower thoracic spine. Multilevel degenerative disc disease of the lumbar spine with disc height loss, most pronounced within the mid and lower thoracic spine. The partially imaged lungs are unremarkable. Soft tissues unremarkable. Lumbar spine: 5 lumbar-type vertebral bodies. Postsurgical changes posterior fusion at L4/L5. No hardware complication. Anterolisthesis of L4 and L5 measuring 5 mm. Multilevel degenerative disc disease of the lumbar lumbar spine with disc height loss, most pronounced at L4/L5 and L5/S1. Anterior marginal osteophytes at L4/L5 and L5/S1. Atherosclerotic vascular disease. Soft tissues otherwise unremarkable.     CT Chest Abdomen Pelvis w/o Contrast    Result Date: 6/12/2023  EXAM: CT CHEST ABDOMEN PELVIS W/O CONTRAST LOCATION: [facility] DATE/TIME: 6/12/2023 2:59 PM CDT INDICATION: abd pain, palpable hernia midline ventral, some chronic back pain known renal mass with hematuria, Oncology has also ordered this study for 2 days from now  COMPARISON: CT chest 09/04/2019 TECHNIQUE: CT scan of the chest, abdomen, and pelvis was performed without IV contrast. Multiplanar reformats were obtained. Dose reduction techniques were used. CONTRAST: None. FINDINGS: LUNGS AND PLEURA: The central airways are clear. Mild bronchial wall thickening. Similar severe bullous emphysematous changes. Multiple new bilateral multilobar pulmonary nodules. This includes a newly visualized 10 x 12 mm solid right lower lobe nodule image 202 series 4. Stable 6 mm right lower lobe nodule measuring 12. New 7 mm left lower lobe nodule image 114. New 12 mm nodule image 196. Multiple additional nodules. No pleural effusion. MEDIASTINUM/AXILLAE: Bilateral gynecomastia. Diffuse chest wall edema. Stable benign 1.9 cm cyst adjacent to the right first costochondral joint, perhaps a synovial cyst. No thoracic adenopathy. Normal heart size and no pericardial effusion. Dilated central pulmonary arteries suggesting pulmonary artery hypertension. Stable dilated esophagus which could reflect achalasia. CORONARY ARTERY CALCIFICATION: Mild. HEPATOBILIARY: Normal. PANCREAS: Normal. SPLEEN: Normal. ADRENAL GLANDS: Stable left adrenal gland thickening. KIDNEYS/BLADDER: Limited evaluation due to the lack of contrast. Masslike enlargement of the mid to upper right kidney with effacement of the hilum. This masslike area measures 8.3 x 8.2 x 6.0 cm. 2.4 cm right mid renal cyst. No follow-up is indicated. No hydronephrosis or hydroureter. BOWEL: Normal caliber small bowel and colon. Apparent anastomotic suture line within a colonic segment in the mid abdomen. Post ventral abdominal wall hernia repair with diastases recti and anterior bulging of intra-abdominal contents below the mesh material. Associated 1.0 x 3.2 cm fluid collection below the mesh material, likely a chronic seroma. Small fat-containing hernia above the mesh material. Mild surrounding fat stranding is likely chronic. Tiny right inguinal  hernia containing fat and trace fluid. No free air. LYMPH NODES: No thoracic adenopathy. VASCULATURE: Mild to moderate aortoiliac atherosclerosis. PELVIC ORGANS: Moderate prostatomegaly. Presacral edema. No pelvic free fluid. MUSCULOSKELETAL: Spinal and pelvic degenerative changes. L4-L5 laminectomy and fusion. No definite suspicious osseous lesion.     IMPRESSION: 1.  Masslike enlargement of the right mid to upper kidney highly suspicious for a primary renal cell carcinoma. Note that evaluation of the kidneys is limited due to the lack of contrast. 2.  Multiple bilateral pulmonary nodules likely representing pulmonary metastases. 3.  Spinal and pelvic degenerative changes. No definite suspicious osseous lesion. 4.   Post mid ventral abdominal hernia repair with a small fat-containing hernia above the mesh material. A small presumed chronic seroma is noted below the mesh material. Diastases recti and anterior bulging of the mesh material are also noted.    Head CT w/o contrast    Result Date: 6/12/2023  EXAM: CT HEAD W/O CONTRAST LOCATION: [facility] DATE/TIME: 6/12/2023 2:56 PM CDT INDICATION: fall COMPARISON: 11/02/2022 TECHNIQUE: Routine CT Head without IV contrast. Multiplanar reformats. Dose reduction techniques were used. FINDINGS: INTRACRANIAL CONTENTS: No intracranial hemorrhage, extraaxial collection, or mass effect.  No CT evidence of acute infarct. Mild presumed chronic small vessel ischemic changes. Mild generalized volume loss. No hydrocephalus. VISUALIZED ORBITS/SINUSES/MASTOIDS: Prior bilateral cataract surgery. Visualized portions of the orbits are otherwise unremarkable. No paranasal sinus mucosal disease. No middle ear or mastoid effusion. BONES/SOFT TISSUES: No acute abnormality.     IMPRESSION: 1.  No CT evidence for acute intracranial process. 2.  Brain atrophy and presumed chronic microvascular ischemic changes as above.     CT images personally reviewed.  It does  clearly show that he has now pulmonary metastases.    Signed by: Nawaf Bains MD, MD    This note has been dictated using voice recognition software. Any grammatical or context distortions are unintentional and inherent to the software

## 2023-06-13 NOTE — PROGRESS NOTES
"PRIMARY DIAGNOSIS: \"GENERIC\" NURSING  OUTPATIENT/OBSERVATION GOALS TO BE MET BEFORE DISCHARGE:  1. ADLs back to baseline: Yes    2. Activity and level of assistance: Up with standby assistance.    3. Pain status: Improved but still requiring IV narcotics.    4. Return to near baseline physical activity: No     Discharge Planner Nurse   Safe discharge environment identified: No  Barriers to discharge: Yes; awaiting medical clearance.        Entered by: Clari Jean RN 06/13/2023 5:34 AM     Please review provider order for any additional goals.   Nurse to notify provider when observation goals have been met and patient is ready for discharge.    Reports pain to abdomen and low back. PRN Percocet given; ineffective relief. PRN IV hydromorphone 0.3 mg given for breakthrough pain; effective relief. Was weaned off O2 for a brief period this shift. De sat down to low 80s on RA with sleep. Placed back on 2 L/min NC. Will continue to monitor and intervene as needed. Up with assist 1. Alarms on for safety.   "

## 2023-06-14 NOTE — PROGRESS NOTES
Pt welcomed and settled on the unit. Prior assessment reported to rico. BP upon arrival to unit 203/88. Pt complaining of pain 9/10. PRN IV dilaudid given. Floor up pain 8/10 and BP recheck 180/80. Will page provider.

## 2023-06-14 NOTE — PROGRESS NOTES
"   06/14/23 0740   Appointment Info   Signing Clinician's Name / Credentials (OT) Mary Black, MOTR/L, CLT   Living Environment   People in Home alone   Current Living Arrangements house   Home Accessibility stairs to enter home   Living Environment Comments Pt reports family/friends come to his house \"every day\" and they assist with cooking/cleaning/driving.   Self-Care   Usual Activity Tolerance moderate   Current Activity Tolerance fair   Equipment Currently Used at Home walker, rolling  (grab bars either in tub or near toilet, difficult to discern from conversation.)   Fall history within last six months yes   Number of times patient has fallen within last six months   (per chart, 6)   Activity/Exercise/Self-Care Comment indep with ADL   General Information   Onset of Illness/Injury or Date of Surgery 06/12/23   Referring Physician Dr. Hall   Patient/Family Therapy Goal Statement (OT) home after hospital stay   Additional Occupational Profile Info/Pertinent History of Current Problem mod:symptomatic anemia;fall;gen weakness   Existing Precautions/Restrictions fall  (fell on back, so spine precautions may be indicated.)   Cognitive Status Examination   Orientation Status orientation to person, place and time   Affect/Mental Status (Cognitive)   (at times, confused and forgot plan for next task)   Follows Commands delayed response/completion   Executive Function Deficit insight/awareness of deficits  (slow to process problem solving)   Visual Perception   Visual Impairment/Limitations corrective lenses full-time   Sensory   Sensory Comments no apparent deficits   Pain Assessment   Patient Currently in Pain No   Posture   Posture not impaired   Range of Motion Comprehensive   General Range of Motion   (grossly intact)   Strength Comprehensive (MMT)   Comment, General Manual Muscle Testing (MMT) Assessment generalized weakness   Muscle Tone Assessment   Muscle Tone Quick Adds No deficits were identified "   Coordination   Upper Extremity Coordination   (some tremors noted)   Bed Mobility   Comment (Bed Mobility) min A   Transfers   Transfer Comments min A   Balance   Balance Comments decreased   Activities of Daily Living   BADL Assessment/Intervention lower body dressing;toileting   Lower Body Dressing Assessment/Training   [place] Level (Lower Body Dressing) minimum assist (75% patient effort)   Toileting   [place] Level (Toileting) minimum assist (75% patient effort)   Clinical Impression   Criteria for Skilled Therapeutic Interventions Met (OT) Yes, treatment indicated   OT Diagnosis decr ADL indep/safety   Influenced by the following impairments fall/gen weakness/new CA dx   OT Problem List-Impairments impacting ADL activity tolerance impaired;balance;cognition;flexibility;mobility;strength;pain   Assessment of Occupational Performance 3-5 Performance Deficits   Identified Performance Deficits dressing, home mgmt, bathing, g/h, cog?, toileting   Planned Therapy Interventions (OT) ADL retraining;balance training;bed mobility training;cognition;strengthening;transfer training;progressive activity/exercise   Clinical Decision Making Complexity (OT) moderate complexity   Risk & Benefits of therapy have been explained care plan/treatment goals reviewed;patient   Clinical Impression Comments pt does not want to go anywhere but home after hospital stay per pt.   OT Total Evaluation Time   OT Eval, Moderate Complexity Minutes (61994) 10   OT Goals   Therapy Frequency (OT) Daily   OT Predicted Duration/Target Date for Goal Attainment 06/20/23   OT Goals Lower Body Dressing;Toilet Transfer/Toileting;Cognition   OT: Lower Body Dressing Modified independent   OT: Toilet Transfer/Toileting Modified independent   OT: Cognitive Patient/caregiver will verbalize understanding of cognitive assessment results/recommendations as needed for safe discharge planning  (min cues)   Interventions   Interventions Quick Adds  Self-Care/Home Management   Self-Care/Home Management   Self-Care/Home Mgmt/ADL, Compensatory, Meal Prep Minutes (06415) 15   Symptoms Noted During/After Treatment (Meal Preparation/Planning Training) none   Treatment Detail/Skilled Intervention Pt in bed upon arrival. Supine to sit with SBA/rail/cues for safety. Pt sat EOB with SBA. Sit to stand with CGA/cues for safety/FWW. Donned socks/shoes with SBA after set-up. Stood for toileting tasks-CGA in standing for urinal. Walked to W/C with CGA/FWW/cues for safety. Educated pt in role of OT and answered all questions.No SOB/complaints noted with activity, on 1LO2.   OT Discharge Planning   OT Plan any SLUMS/ACLS?; any ADL-walk to BR for g/h.   OT Discharge Recommendation (DC Rec) Transitional Care Facility   OT Rationale for DC Rec pt lives alone and appears to have decr activity beatrice/decr balance/possibly decreased cog and would benefit from OT to incr strength/cog for ADL   OT Brief overview of current status CGA for BADL   Total Session Time   Timed Code Treatment Minutes 15   Total Session Time (sum of timed and untimed services) 25

## 2023-06-14 NOTE — PROGRESS NOTES
Place of Service:  [facility]     Reason for follow up: Renal mass    SUBJECTIVE:  Events:     Patient reports doing well.  No irritative voiding symptoms.  Still with cherry colored urine.  No chest pain, shortness of breath or dizziness.  Feels he is emptying well he had CT-guided rt lung bx.    OBJECTIVE:  PHYSICAL EXAM:  Temp: 98.3  F (36.8  C) Temp src: Oral BP: (!) 203/88 Pulse: 77   Resp: 20 SpO2: 92 % O2 Device: Nasal cannula Oxygen Delivery: 1 LPM  General: NAD, alert, cooperative  Head: normocephalic, without abnormality / atraumatic  Abdomen: soft, nontender tender, nondistended distended.  No suprapubic fullness, no suprapubic tenderness.  No CVA tenderness,   Skin: No rashes or lesions  Musculoskeletal: moves all four extremities equally; no calf edema or tenderness  Psychological: alert and oriented, answers questions appropriately    LABS:  Creatinine   Date Value Ref Range Status   06/14/2023 1.94 (H) 0.70 - 1.30 mg/dL Final     WBC Count   Date Value Ref Range Status   06/14/2023 7.4 4.0 - 11.0 10e3/uL Final     Hemoglobin   Date Value Ref Range Status   06/14/2023 9.0 (L) 13.3 - 17.7 g/dL Final   ]  Platelet Count   Date Value Ref Range Status   06/14/2023 171 150 - 450 10e3/uL Final       UA:  UA RESULTS:  Recent Labs   Lab Test 06/12/23  1534 09/06/22  0602 05/15/20  1427   COLOR Light Orange*   < > Yellow   APPEARANCE Cloudy*   < > Cloudy*   URINEGLC Negative   < > Negative   URINEBILI Negative   < > Negative   URINEKETONE Negative   < > Negative   SG 1.013   < > 1.017   UBLD >1.0 mg/dL*   < > Negative   URINEPH 5.5   < > 5.5   PROTEIN 100*   < > 30 mg/dL*   UROBILINOGEN  --   --  <2.0 E.U./dL   NITRITE Negative   < > Negative   LEUKEST 25 Virgilio/uL*   < > Negative   RBCU >182*   < > 0-2   WBCU 0   < > 0-5    < > = values in this interval not displayed.       Lab Results: personally reviewed.     ASSESSMENT/PLAN:  Soto Gibbs is being seen by [facility] for enlarging right renal  mass. He has     history of biopsy-proven right kidney cancer (8/25/2022) for which patient and/or daughter previously declined nephrectomy.      - Right renal mass enlarging, now 8.3 cm on 6/12 CT and gross hematuria present.    - Hg stable.  Low PVRs, no concerns for clot retention.  No concerns for unstable bleeding.  -Creatinine continues to improve at 1.94, back at baseline  -Hgb stable at 9 today,  -Currently no urgent surgical urologic intervention indicated  -Maintain follow up appt with Dr. Erickson 7/20/23 to further discuss options for renal mass management.   -[facility] will continue to follow, if patient remains with stable hematuria will likely sign off tomorrow      Thelma Cruz PA-C  [facility]   [phone]

## 2023-06-14 NOTE — PROGRESS NOTES
06/14/23 1520   Appointment Info   Signing Clinician's Name / Credentials (PT) Ward Woodruff, PT, DPT   Living Environment   People in Home alone   Current Living Arrangements house   Home Accessibility stairs to enter home   Number of Stairs, Main Entrance 4   Self-Care   Usual Activity Tolerance moderate   Current Activity Tolerance fair   Equipment Currently Used at Home walker, rolling   Fall history within last six months yes   Number of times patient has fallen within last six months 2   General Information   Onset of Illness/Injury or Date of Surgery 06/12/23   Referring Physician Dr. Hall   Patient/Family Therapy Goals Statement (PT) Decrease pain with mobility   Pertinent History of Current Problem (include personal factors and/or comorbidities that impact the POC) Anemia, Gen muscle weakness, fall   Existing Precautions/Restrictions fall;oxygen therapy device and L/min   Weight-Bearing Status - LLE full weight-bearing   Weight-Bearing Status - RLE full weight-bearing   Range of Motion (ROM)   ROM Comment WFL   Strength (Manual Muscle Testing)   Strength Comments Generalized muscle weakness   Bed Mobility   Bed Mobility supine-sit;sit-supine   Supine-Sit [place] (Bed Mobility) modified independence   Sit-Supine [place] (Bed Mobility) modified independence   Impairments Contributing to Impaired Bed Mobility pain;decreased strength   Assistive Device (Bed Mobility) bed rails   Transfers   Transfers sit-stand transfer   Sit-Stand Transfer   Sit-Stand [place] (Transfers) contact guard   Assistive Device (Sit-Stand Transfers) walker, front-wheeled   Gait/Stairs (Locomotion)   [place] Level (Gait) contact guard   Assistive Device (Gait) walker, front-wheeled   Distance in Feet 10'   Distance in Feet (Gait) 40'   Pattern (Gait) step-through   Deviations/Abnormal Patterns (Gait) dean decreased;stride length decreased   Clinical Impression   Criteria for Skilled Therapeutic  Intervention Yes, treatment indicated   PT Diagnosis (PT) Impaired functional mobility   Influenced by the following impairments Weakness, pain   Functional limitations due to impairments Transfers, gait, stairs   Clinical Presentation (PT Evaluation Complexity) Evolving/Changing   Clinical Presentation Rationale Pt presents as medically diagnosed   Clinical Decision Making (Complexity) moderate complexity   Planned Therapy Interventions (PT) gait training;patient/family education;stair training;strengthening;transfer training   Anticipated Equipment Needs at Discharge (PT) walker, rolling   Risk & Benefits of therapy have been explained care plan/treatment goals reviewed;patient   PT Total Evaluation Time   PT Eval, Moderate Complexity Minutes (34950) 10   Physical Therapy Goals   PT Frequency Daily   PT Predicted Duration/Target Date for Goal Attainment 06/19/23   PT Goals Transfers;Gait;Stairs   PT: Transfers Supervision/stand-by assist;Sit to/from stand   PT: Gait Supervision/stand-by assist;Rolling walker;100 feet   PT: Stairs Supervision/stand-by assist;4 stairs;Rail on both sides   Interventions   Interventions Quick Adds Gait Training;Therapeutic Activity   Therapeutic Activity   Therapeutic Activities: dynamic activities to improve functional performance Minutes (80065) 12   Symptoms Noted During/After Treatment Fatigue;Increased pain   Treatment Detail/Skilled Intervention Supine to sit Mod I with HOB elevated and use of bedrail, increased time for set up, pt on 2L of O2. SBA at EOB. Shoes donned/doffed at EOB. Sit<>stand CGA, cues for scooting fwd. Pt able to scoot towards HOB and reposition in bed as needed. Increased time due to pt fatigue and SOB with activity.   Gait Training   Gait Training Minutes (79607) 5   Symptoms Noted During/After Treatment (Gait Training) fatigue;increased pain   Treatment Detail/Skilled Intervention Pt amb around the room twice CGA with FWW, moving slowly but no LOB or  adverse s/s other than stomach and back pain. Mild lightheadedness. PT managing O2 tubing. Cues for navigation and safety.   [place] Level (Gait Training) contact guard   Physical Assistance Level (Gait Training) supervision;verbal cues   Weight Bearing (Gait Training) full weight-bearing   Assistive Device (Gait Training) rolling walker   Pattern Analysis (Gait Training) swing-through gait   Gait Analysis Deviations decreased dean;decreased step length   Impairments (Gait Analysis/Training) pain   PT Discharge Planning   PT Plan Transfers, amb, stairs   PT Discharge Recommendation (DC Rec) (S)  Transitional Care Facility;home with assist;home with home care physical therapy   PT Rationale for DC Rec Pt is able to amb short distances but has not done stairs, gets fatigued quickly and is painful, high fall risk if home at this time as pt lives alone, may progress and be safe to return pending time at [facility].   PT Brief overview of current status CGA with transfers and amb short distances (twice around room)   Total Session Time   Timed Code Treatment Minutes 17   Total Session Time (sum of timed and untimed services) 27

## 2023-06-14 NOTE — PROGRESS NOTES
Care Management Follow Up    Length of Stay (days): 1    Expected Discharge Date: 06/15/2023     Concerns to be Addressed:       Patient plan of care discussed at interdisciplinary rounds: Yes    Anticipated Discharge Disposition: Home, Home Care (home with [facility] for PT/HHA)  Disposition Comments: Declines TCU. Will D/C home with [facility] for PT/HHA. Friend to transport and bring portable 02.  Anticipated Discharge Services:  ([facility] for PT/HHA)  Anticipated Discharge DME:  (nothing new)    Patient/family educated on Medicare website which has current facility and service quality ratings: yes (agreeable to HC services for PT- referral sent)  Education Provided on the Discharge Plan: Yes  Patient/Family in Agreement with the Plan: yes    Referrals Placed by CM/SW: Homecare  Private pay costs discussed: None indicated.    Additional Information:  Chart reviewed. PT/OT recommendations for TCU.     CM met with patient. He is adamant about not going to TCU. He only wants Home Care for PT/HHA; [facility] accepted. His friend will provide transportation at hospital discharge (and bring portable 02).       Zena North RN

## 2023-06-14 NOTE — PLAN OF CARE
Problem: Plan of Care - These are the overarching goals to be used throughout the patient stay.    Goal: Optimal Comfort and Wellbeing  Outcome: Progressing  Intervention: Monitor Pain and Promote Comfort  Flowsheets  Taken 6/13/2023 2300  Pain Management Interventions:   medication (see MAR)   repositioned   rest  Taken 6/13/2023 1820  Pain Management Interventions: medication (see MAR)  Intervention: Provide Person-Centered Care  Flowsheets (Taken 6/13/2023 2300)  Trust Relationship/Rapport:   care explained   questions answered   choices provided   questions encouraged   reassurance provided   Goal Outcome Evaluation:    A/Ox4. Dilaudid IV and PO given for pain management with minimal results. Pt continuing to void red-colored urine; Hgb recheck in AM. NSR on tele.

## 2023-06-14 NOTE — PRE-PROCEDURE
GENERAL PRE-PROCEDURE:   Procedure:  Computed tomography guided lung biopsy with moderate sedation  Date/Time:  6/14/2023 8:29 AM    Verbal consent obtained?: Yes    Written consent obtained?: Yes    Risks and benefits: Risks, benefits and alternatives were discussed    DC Plan: Appropriate discharge home plan in place for patients who are going home after procedure   Consent given by:  Patient  Patient states understanding of procedure being performed: Yes    Patient's understanding of procedure matches consent: Yes    Procedure consent matches procedure scheduled: Yes    Expected level of sedation:  Moderate  Appropriately NPO:  Yes  ASA Class:  2  Mallampati  :  Grade 3- soft palate visible, posterior pharyngeal wall not visible  Lungs:  Lungs clear with good breath sounds bilaterally  Heart:  Normal heart sounds and rate  History & Physical reviewed:  History and physical reviewed and no updates needed  Statement of review:  I have reviewed the lab findings, diagnostic data, medications, and the plan for sedation

## 2023-06-14 NOTE — PROVIDER NOTIFICATION
Paged remote provider: BP elevated 196/85; hydralazine 10 mg IV given at 1600 but did not have much effect. Looks like pt's lisinopril is being held due to ELISABETH. Any new orders?    Trying pain management first per MD    Repaged: Gave 0.4 mg Dilaudid IV, BP recheck 188/81, asymptomatic. Pain 5/10. Do you want to try anything else? Or could try hydralazine again or just continue to monitor.    Trying hydralazine again per MD and will keep updated.

## 2023-06-14 NOTE — PLAN OF CARE
Problem: Plan of Care - These are the overarching goals to be used throughout the patient stay.    Goal: Optimal Comfort and Wellbeing  Intervention: Monitor Pain and Promote Comfort  Recent Flowsheet Documentation  Taken 6/14/2023 0742 by Radha Rangel RN  Pain Management Interventions:   repositioned   rest   pillow support provided  Taken 6/14/2023 0549 by Radha Rangel RN  Pain Management Interventions:   repositioned   rest   pillow support provided  Taken 6/14/2023 0442 by Radha Rangel RN  Pain Management Interventions:   medication (see MAR)   repositioned   rest   pillow support provided  Taken 6/14/2023 0019 by Radha Rangel RN  Pain Management Interventions:   repositioned   rest  Taken 6/13/2023 2317 by Radha Rangel RN  Pain Management Interventions:   medication (see MAR)   repositioned   rest     Problem: Plan of Care - These are the overarching goals to be used throughout the patient stay.    Goal: Optimal Comfort and Wellbeing  Intervention: Provide Person-Centered Care  Recent Flowsheet Documentation  Taken 6/14/2023 0000 by Radha Rangel RN  Trust Relationship/Rapport:   care explained   questions answered   choices provided   questions encouraged   reassurance provided   Goal Outcome Evaluation:    A&OX4 but forgetful. VSS except for elevated BP treated w/ PRN hydralazine (see mar). 2 liters O2 via NC while sleeping. Pt pain treated w/ PRN PO and IV Dilaudid (see mar). Pt biopsy scheduled at 0810 am. Tele normal sinus. Pt hasn't had BM in 6 days and requested stool softener. Pt  NPO as of idnight for biopsy in am.

## 2023-06-14 NOTE — DISCHARGE INSTRUCTIONS
LUNG BIOPSY    1. You are required to have someone accompany you home. Do not drive or operate machinery today as the medication may cause sleepiness.    2. Rest today and avoid strenuous activity or heavy lifting for 48 hours. Over-activity may produce dizziness and or nausea.    3. You should follow your normal diet. Drink plenty of fluids. No alcoholic beverages for 24 hours. *(Alcohol may interact with the medications you received today)    4. Leave bandage on today, you may remove tomorrow.    5. You may shower tomorrow. Do not soak in a bath tub, hot tub, or swim until the site is completely healed and the skin glue is off. Keep the site clean and dry.    6. Watch your biopsy site for signs of infection, increase pain, redness, swelling, or any drainage and or fever or chills.    7. If you experience sudden weakness, dizziness, shortness of breath, a temperature above 100.0 degree F for more than 24 hours call your doctor or go to the emergency room.        Discharge Instructions Needle Biopsy: Lung    You had a needle biopsy of one of your lungs. In this procedure, a hollow needle is used to take one or more samples of your lung tissue. The tissue is then examined under a microscope. There are several different types of needle biopsies. Two types are:   Fine needle aspiration. A small amount of tissue is withdrawn (aspirated) using a very fine needle.  Core biopsy. A larger tissue sample is removed for examination.  A biopsy needle is inserted through your skin into your chest and lung. This is called a transthoracic approach, which means across or through the chest (thorax). Scans are done at the same time so that your provider can find the area where he or she would like to sample tissue. Needle biopsies don't require cuts or incisions into the body like open biopsies.     Your healthcare provider will use the results of your biopsy to help diagnose your condition.     Home care  The site of the biopsy may  feel numb for a while if you got numbing medicine.  You might have a little soreness after the needle biopsy.  Follow your healthcare provider's instructions about removing bandages and showering or bathing.  You may be sleepy after the biopsy if you got medicine to help you relax (sedation). Don't drive until the next day or as instructed by your healthcare provider.  Don't do heavy lifting, a lot of stair climbing, vigorous exercise, or take part in sports the day of your biopsy. You can get back to your regular activities as instructed by your healthcare provider.     Follow-up care  Follow up with your healthcare provider, or as advised. Be sure you make an appointment with your healthcare provider to discuss the biopsy results.     When to call your healthcare provider  Call your healthcare provider right away if any of these occur:   Infection. You might have redness, pain, swelling, or drainage at the site of your biopsies.  Bleeding. You might also have bleeding at the site of your biopsies.  Coughing up blood. This may only be a small amount.    Call 911  Call 911 right away if any of these occur:   Collapsed lung (pneumothorax). This means that air from your lungs leaks out into the spaces between your lungs and chest wall. It can lead to feeling short of breath, pain with breathing, trouble breathing, and a collapsed lung.  Fast pulse  Sharp pains in your chest or shoulder  Fingernails, lips, or skin turn blue, purple, or gray  Trouble walking or talking  Feeling faint or dizzy    Pedro last reviewed this educational content on 10/1/2019    9353-0671 [employer]. All rights reserved. This information is not intended as a substitute for professional medical care. Always follow your healthcare professional's instructions.

## 2023-06-14 NOTE — PROGRESS NOTES
Patient Name: Soto Gibbs  Medical Record Number: [id number]  Today's Date: 6/14/2023    Procedure: Image Guided Lung Biopsy of Right Lower Lobe Nodule with moderate sedation  Proceduralist: Dr. John Fahrner  Pathology present: Yes, cytotech    Procedure Start: 0840  Procedure end: 0910  Sedation medications administered: Fentanyl 50 mcg, Versed 1 mg    Report given to: PATRICIA Ramos  : n/a    Other Notes: Pt arrived to IR room #1 from [address] [place] [address]. Consent reviewed. Pt denies any questions or concerns regarding procedure. Pt positioned prone and monitored per protocol. Pt tolerated procedure without any noted complications. Pt transferred back to [address] [place] [address].    Hydralazine 10 mg given per Dr. Fahrner for intra-procedural hypertension.

## 2023-06-14 NOTE — PROCEDURES
[facility]    Procedure: Computed tomography guided right lung biopsy with moderate sedation    Date/Time: 6/14/2023 9:18 AM    Performed by: Fahrner, Lester, MD  Authorized by: Fahrner, Lester, MD      UNIVERSAL PROTOCOL   Site Marked: Yes  Prior Images Obtained and Reviewed:  Yes  Required items: Required blood products, implants, devices and special equipment available    Patient identity confirmed:  Verbally with patient  Patient was reevaluated immediately before administering moderate or deep sedation or anesthesia  Confirmation Checklist:  Patient's identity using two indicators, relevant allergies, procedure was appropriate and matched the consent or emergent situation and correct equipment/implants were available  Time out: Immediately prior to the procedure a time out was called    Universal Protocol: the Joint Commission Universal Protocol was followed    Preparation: Patient was prepped and draped in usual sterile fashion    ESBL (mL):  0     ANESTHESIA    Anesthesia: Local infiltration  Local Anesthetic:  Lidocaine 1% without epinephrine  Anesthetic Total (mL):  4      SEDATION  Patient Sedated: Yes    Sedation Type:  Moderate (conscious) sedation  Sedation:  Fentanyl, midazolam and see MAR for details  Vital signs: Vital signs monitored during sedation    See dictated procedure note for full details.    PROCEDURE    Patient Tolerance:  Patient tolerated the procedure well with no immediate complications  Length of time physician/provider present for 1:1 monitoring during sedation: 30

## 2023-06-14 NOTE — PLAN OF CARE
Goal Outcome Evaluation:  Pt alert & pleasant & able to make needs known. He went to IR this morning to get a biopsy of a new spot on his lung (presumed to be cancerous). While there pt had to ly on his chest during the procedure. He has COPD and likely hasn't been in the prone position in years. Pt c/o 10/10 pain upon his return. Pain meds & HTN meds were given in conjunction with orders. These were ineffective at first. The orders were modified to increase the dose of the pain meds & pt had better relief.

## 2023-06-14 NOTE — PROGRESS NOTES
Pt has been hypertensive for the last couple of hours. He had been required to ly in the prone position for a period of time while undergoing a biopsy procedure. Pt has COPD and is also noted to be wheezing upon his return to this unit. MD notified. New orders placed for nebs, inhalers etc. Will continue to monitor

## 2023-06-14 NOTE — PROGRESS NOTES
[facility]    Medicine Progress Note - Hospitalist Service    Date of Admission:  6/12/2023    Addendum: 6/14/2023 1415 hrs.  Was contacted after patient returned from CT-guided biopsy.  Patient developed lower substernal chest pain as well as upper abdominal pain.  Pain began while laying prone on exam table for biopsy.  He denied any symptoms of shortness of breath, diaphoresis, palpitations or radiation of pain.  Pain was worse with palpation on exam.  Patient had inspiratory wheezing noted as well.    EKG revealed normal sinus rhythm no acute ST or T wave changes.  Serial troponins were negative.  Chest x-ray was obtained which indicated right slight increased opacity over medial right lower lobe consistent with area of biopsy.  There was no pneumothorax.  No evidence of other infiltrate or pulmonary arterial congestion.    Patient's symptoms are consistent with atypical chest pain likely secondary to musculoskeletal cause.  Patient also has underlying respiratory wheezings consistent with mild COPD exacerbation.  Patient's Dilaudid was increased to 2 to 4 mg every 4 hours as needed.  GI cocktail was ordered.  Patient will also receive albuterol nebulized treatments for underlying COPD and respiratory wheezing.      Assessment & Plan   83-year-old with history of chronic kidney disease, hypertension, peripheral neuropathy, previous ventral hernia repair with mesh, chronic respiratory failure on supplemental oxygen and COPD, who presents after a fall on 6/12/2023.  Patient was found on the floor in his home.  Patient complained of gross hematuria for several months.  Patient had known right renal mass with previous biopsy which indicated benign mass and incidental finding of pulmonary nodules.  Admitted for gross hematuria, generalized weakness, fall, progression and chronic anemia likely secondary to continued gross hematuria.  CT abdomen revealed ventral abdominal hernia.  CT guided  biopsy planned of pulmonary nodules on 6/14/23.  Oncology, urology following.     Gross hematuria  Right oncocytic tumor grade 2   Chronic normocytic anemia  Pulmonary nodules.   Core biopsy on 9/29/22 of right renal mass.   CT guided biopsy pulmonary nodules 6/14/23.   Hemoglobin 9.0 g/dL, stable 8.6g/dL on admission.  Order Iron studies, reticulocyte count. Vitamin b12 level.   CBC daily.   Transfuse for hemoglobin < 7 g/dL.   Urology and oncology consult appreciated.     Abdominal pain  Ventral abdominal hernia  History of hernia repair with mesh.   General surgery was consulted.   Continue tylenol, hydromorphone 2-4 mg q4h prn  hydromorphone 0.5 mg IV q3h prn for uncontrolled pain.    Hypertensive urgency  hydralazine 10 mg IV q6h prn for SBP > 160 mmHg.   Continue home PTA medications: atenolol 25 mg daily,   Order lisinopril 20 mg daily for ELISABETH, hyperkalemia.   Hold hydrochlorothiazide  Order amlodipine 5 mg po daily.    Chest pain, upper abdominal pain.   Atypical for cardiac pain, palpation reproduces pain.   Order EKG, CXR, troponin I.     Constipation  Without bowel movement for 5 days.   Bisacodyl suppository 10 mg rectal daily prn.   Start miralax 17 g po BID.    Fall initial encounter  Degenerative disc disease lumbar spine.   Coccyx pain  CT head negative for acute pathology.  Xray thoracic and lumbar spine with previous posterior fusion L4-L5 and no complication of hardware, no acute fracture.  Multilevel degenerative disc disease noted.   PT and OT evaluation  Continue pain control as above.     Chronic respiratory failure with hypoxia  COPD  Stable.   Resume home inhalers and supplemental oxygen at 2L/min    Hyperlipidemia  Continue home statin.     Acute kidney injury on Chronic kidney disease stage 3b.   Mild hyperkalemia  Creatinine baseline 2.0, max 2.42 on admission. Now 1.9.   Potassium 5.2->4.6.   Continue IV hydration  ml/hr.   Hold lisinopril.    Peripheral neuropathy  gabapentin 300  "mg BID prn.     Sleep apnea  Non compliant with CPAP     Diet: NPO per Anesthesia Guidelines for Procedure/Surgery Except for: Meds    DVT Prophylaxis: Pneumatic Compression Devices  Hill Catheter: Not present  Lines: None     Cardiac Monitoring: ACTIVE order. Indication: bleeding  Code Status: Full Code      Clinically Significant Risk Factors Present on Admission                  # Hypertension: Home medication list includes antihypertensive(s)      # Obesity: Estimated body mass index is 31.61 kg/m  as calculated from the following:    Height as of this encounter: 1.905 m (6' 3\").    Weight as of this encounter: 114.7 kg (252 lb 13.9 oz).            Disposition Plan      Expected Discharge Date: 06/15/2023      Destination: home;home with help/services  Discharge Comments: Rt lung bx  Anticiapte home with HC  Potential need for Palliative  needing liver biopsy with IR? r/t new lesions          Gucci Hall DO  Hospitalist Service  [facility]  Securely message with [employer] (more info)  Text page via [employer] Paging/Directory   ______________________________________________________________________    Interval History   Patient reports lower substernal chest pressure and upper abdominal pain.  He denies bowel movement.  Patient has returned from lung nodule biopsy.  He reports pain started while laying prone on exam table.  Denies palpitations.  NO shortness of breath.     Physical Exam   Vital Signs: Temp: 98.6  F (37  C) Temp src: Oral BP: (!) 189/81 Pulse: 83   Resp: 22 SpO2: 95 % O2 Device: Nasal cannula Oxygen Delivery: 1 LPM  Weight: 252 lbs 13.88 oz    GENERAL: The patient is not in any acute distressed. Awake and alert.  HEENT: Nonicteric sclerae, PERRLA, EOMI. Oropharynx clear. Moist mucous membranes. Conjunctivae appear well perfused.  HEART: Regular rate and rhythm without murmurs.  CHEST: tenderness to palpation over lower strernum.  LUNGS: Clear to auscultation bilaterally. " Positive wheezing.  ABDOMEN: Soft, positive bowel sounds, tender over epigastric area.  SKIN: No rash, no excessive bruising, petechiae, or purpura.  EXTREMITIES : no rashes, no swelling in legs.  NEUROLOGIC: conscious and oriented, follows commands, no obvious focal deficits.    Medical Decision Making     50 MINUTES SPENT BY ME on the date of service doing chart review, history, exam, documentation & further activities per the note.      Data     I have personally reviewed the following data over the past 24 hrs:    7.4  \   9.0 (L)   / 171     142 102 39 (H) /  113   4.6 31 1.94 (H) \       Ferritin:  N/A % Retic:  2.1 LDH:  N/A       Imaging results reviewed over the past 24 hrs:   Recent Results (from the past 24 hour(s))   CT Lung Mediastinum Biopsy    Narrative    EXAM:  1. PERCUTANEOUS BIOPSY RIGHT LOWER LOBE NODULE   2. CT GUIDANCE  3. CONSCIOUS SEDATION  LOCATION: [facility]  DATE: 6/14/2023    INDICATION: renal cancer. lung nodules  TECHNIQUE: Dose reduction techniques were used.    PROCEDURE: Informed consent obtained. Site marked. Prior images reviewed. Required items made available. Patient identity confirmed verbally and with arm band. Patient reevaluated immediately before administering sedation. Universal protocol was   followed. Time out performed. The site was prepped and draped in sterile fashion. 5 mL of 1% lidocaine was infused into the local soft tissues. Using standard technique and under direct CT guidance, a 20-gauge biopsy device was used to obtain three core   biopsies. Tissue was submitted to Pathology and was adequate by preliminary review by a pathologist.    The patient tolerated the procedure well. No immediate complications.    SEDATION: Versed 1  mg. Fentanyl 50 mcg. The procedure was performed with administration intravenous conscious sedation with appropriate preoperative, intraoperative, and postoperative evaluation.    30  minutes of supervised face to face  conscious sedation time was provided by a radiology nurse under my direct supervision.      Impression    IMPRESSION:  1.  Successful CT-guided biopsy of the right lower lobe nodule with moderate sedation .    Reference CPT Codes: 84945, 37007, 66522, 37791

## 2023-06-15 NOTE — PLAN OF CARE
Goal Outcome Evaluation:  Overnight MD paged at beginning of shift d/t concerns of labored breathing and pt overall condition. MD verbally ordered lasix, additional PIV line, and PRN dilaudid for reported all over pain. Xray and CT imaging completed. STAT labs completed. RT administered nebulizer. US imaging to be done. Pt was also placed on a primofit with dark red urine output. PRN hydralazine also given for elevated blood pressure. Pt currently on 2L NC statting in upper 90s.

## 2023-06-15 NOTE — CONSULTS
"[facility] ACUTE PAIN SERVICE CONSULTATION     Date of Admission:  6/12/2023  Date of Consult (When I saw the patient): 06/15/23  Physician requesting consult: Gucci Hall DO   Reason for consult: Atypical chest pain complaints     Assessment/Plan:     Soto Gibbs is a 83 year old male who was admitted on 6/12/2023.  Pain team was asked to see the patient for atypical chest pain. Admitted through ED presenting for evaluation of a mechanical fall. He reports falls at home and feeling increasingly weak over the last several months. He also reported in the ED of several months of ongoing hematuria. Urology consulted for enlarging right renal mass (He has history of biopsy-proven right kidney cancer (8/25/2022) for which patient and/or daughter previously declined nephrectomy) and Oncology consulted for metastatic renal cell carcinoma. Per hospitalist note: Patient developed hypertensive urgency, increased shortness of breath and atypical chest pain on 6/14 and overnight received IV furosemide with some improvement in symptoms. History of CKD, HTN, and T2DM. He reports history of chronic back pain taking Percocet 5-325 mg up to 6 times daily. Describes pain as 8-9/10 and aching \"all over\" in back, chest, abdomen, hands. The patient denies nausea, vomiting, shortness of breath. He is currently on NC O2. He reports constipation. Diet is Low sodium (2g max). The patient is a former smoker and denies chemical dependency history.     Opioid Induced Respiratory Depression Risk Assessment:?  Moderate 2-4: due to the following risk factors:  renal dysfunction, Obesity, Age>60, and concomitant CNS depressants    In the past 24 hours patient has received: hydromorphone PO 4mg x2 and 2mg x1; Hydromorphone IV 0.4 mg x1, 0.5 mg x4, and 0.2 mg x1 for a total MME of 92    PLAN:   1) Pain is consistent with atypical chest pain, abdomen pain, chronic back pain. Xray thoracic and lumbar spine with previous posterior " fusion L4-L5 and no complication of hardware, no acute fracture.  Multilevel degenerative disc disease noted. Consider the following differentials cancer related pain with renal mass, abdomen pain in the setting of constipation, atypical for cardiac pain.  Labs and imaging indicated: I have personally reviewed pertinent notes, labs, tests, and radiologic imaging in patient's chart. Treatment plan includes multimodal pain approach, Hospital Medicine Service for medical management, Urology, Oncology. Patient educated regarding multimodal pain approach, medications as listed below. Patient is understanding of the plan. All questions and concerns addressed to patient's satisfaction.   2)Multimodal Medication Therapy  Topical: patient declines voltaren, lidocaine   NSAID'S: CrCl 34.9 mL/min. None  Steroids: Methylprednisolone 40 mg q8h  Muscle Relaxants: declines robaxin or other muscle relaxants   Adjuvants: APAP 650 mg q6h prn - declines this and does not want this scheduled, gabapentin 300 mg BID PRN (home med per pharmacy med recc patient just started taking again one week ago, he has not filled since 12/3/22 per )  Antidepressants/anxiolytics: None  Opioids: Hydromorphone 2-4 mg q4h prn, takes percocet at home 5-325 mg q4h prn   IV Pain medication: Hydromorphone 0.5 mg q3h prn   3)Non-medication interventions: he declines ice, heat   Acupuncture consult - offered and declined  Integrative consult - offered and declined  4)Constipation Prophylaxis: Scheduled Miralax daily and Senna/Docusate BID  5) Care Teams: Hospitalist, Pain Team, Urology,     -Opioid prescriber has been Prema Ibrahim David A, MD - Primary Care Provider   -[place]  pulled from system on 06/15/23. This indicates 06/07/23 Oxycodone-APAP 5-325 mg #180 (30 day)  Discharge Recommendations - We recommend prescribing the following at the time of discharge: likely home Percocet, filled 180 tabsd on 6/7/23      History of Present Illness  (HPI):       Soto Gibbs is a 83 year old male who presented for Evaluation of fall at home.  Past medical history as above. The pain is reported to be acute, chronic, located in the low back, abdomen, chest. Pain does not radiate. Current pain is rated at 8-9/10 and goal is 2-4/10.      Per [place]  review, the patient does have an opioid tolerance. Opioid induced side effects noted and include: constipation      Reviewed medical record, labs, imaging, ED note, and care everywhere.     Past pain treatments have included: Methocarbamol and Ibuprofen, percocet, APAP, Gabapentin       Home pain medications/psych medications/anticoagulation medications include: Gabapentin and oxycodone-APAP    Medical History   PAST MEDICAL HISTORY: No past medical history on file.    PAST SURGICAL HISTORY:   Past Surgical History:   Procedure Laterality Date     ABDOMEN SURGERY      Hernia     COLON SURGERY      Polyps.       FAMILY HISTORY: No family history on file.    SOCIAL HISTORY:   Social History     Tobacco Use     Smoking status: Former     Types: Cigarettes     Quit date: 1985     Years since quittin.3     Smokeless tobacco: Never   Vaping Use     Vaping status: Not on file   Substance Use Topics     Alcohol use: No        HEALTH & LIFESTYLE PRACTICES  Tobacco:  reports that he quit smoking about 38 years ago. He has never used smokeless tobacco.  Alcohol:  reports no history of alcohol use.  Illicit drugs:  reports no history of drug use.    Allergies  Allergies   Allergen Reactions     Morphine (Pf) [Morphine] Other (See Comments) and Dizziness     Hallucinations       Problem List  Patient Active Problem List    Diagnosis Date Noted     ELISABETH (acute kidney injury) (H) 2023     Priority: Medium     Generalized muscle weakness 2023     Priority: Medium     Fall, initial encounter 2023     Priority: Medium     Symptomatic anemia 2023     Priority: Medium     Diabetes mellitus, type 2 (H)  05/10/2022     Priority: Medium     Chronic kidney disease, stage 3a (H) 05/10/2022     Priority: Medium       Prior to Admission Medications   Medications Prior to Admission   Medication Sig Dispense Refill Last Dose     albuterol (PROVENTIL HFA;VENTOLIN HFA) 90 mcg/actuation inhaler [ALBUTEROL (PROVENTIL HFA;VENTOLIN HFA) 90 MCG/ACTUATION INHALER] Inhale 2 puffs every 6 (six) hours as needed for wheezing or shortness of breath. 8.5 g 0 Past Week     albuterol (PROVENTIL) 2.5 mg /3 mL (0.083 %) nebulizer solution [ALBUTEROL (PROVENTIL) 2.5 MG /3 ML (0.083 %) NEBULIZER SOLUTION] Take 3 mL (2.5 mg total) by nebulization every 4 (four) hours as needed for shortness of breath. 75 mL 0 More than a month     atenolol (TENORMIN) 25 MG tablet [ATENOLOL (TENORMIN) 25 MG TABLET] Take 1 tablet (25 mg total) by mouth every morning. 30 tablet 0 6/12/2023     ferrous sulfate (FEROSUL) 325 (65 Fe) MG tablet Take 325 mg by mouth daily (with breakfast)   6/12/2023     gabapentin (NEURONTIN) 300 MG capsule Take 300 mg by mouth 2 times daily as needed for neuropathic pain   6/12/2023 at am     hydrochlorothiazide (HYDRODIURIL) 25 MG tablet Take 25 mg by mouth daily   6/12/2023     lisinopril (PRINIVIL,ZESTRIL) 20 MG tablet [LISINOPRIL (PRINIVIL,ZESTRIL) 20 MG TABLET] Take 20 mg by mouth every morning.   6/12/2023     oxyCODONE-acetaminophen (PERCOCET) 5-325 mg per tablet [OXYCODONE-ACETAMINOPHEN (PERCOCET) 5-325 MG PER TABLET] Take 1 tablet by mouth every 4 (four) hours as needed for pain.   6/11/2023     OXYGEN-AIR DELIVERY SYSTEMS MISC [OXYGEN-AIR DELIVERY SYSTEMS MISC] Inhale 2 L/min continuous. Indications: copd, sleep apnea   6/12/2023     simvastatin (ZOCOR) 20 MG tablet Take 20 mg by mouth every evening   6/11/2023     vitamin C with B complex (B COMPLEX-C) tablet Take 1 tablet by mouth daily   6/12/2023       Review of Systems  Complete ROS reviewed, unless noted in HPI, all other systems reviewed (with patient) and all  "others found to be negative.      Objective:     Physical Exam:  BP (!) 151/69 (BP Location: Left arm)   Pulse 75   Temp 98.3  F (36.8  C) (Oral)   Resp 30   Ht 1.905 m (6' 3\")   Wt 112.6 kg (248 lb 3.8 oz)   SpO2 98%   BMI 31.03 kg/m    Weight:   Vitals:    06/13/23 0530 06/14/23 0510 06/15/23 0600   Weight: 111.9 kg (246 lb 9.6 oz) 114.7 kg (252 lb 13.9 oz) 112.6 kg (248 lb 3.8 oz)      Body mass index is 31.03 kg/m .    General Appearance:  Alert, cooperative, no distress   Head:  Normocephalic, without obvious abnormality, atraumatic   Eyes:  PERRL, conjunctiva/corneas clear, EOM's intact   ENT/Throat: Lips, mucosa, and tongue normal; teeth and gums normal   Lymph/Neck: Supple, symmetrical, trachea midline   Lungs:   On NC O2, wheezing, respirations unlabored   Cardiovascular/Heart:  Regular rate and rhythm, S1, S2 normal   Abdomen:   Soft, tender, bowel sounds active all four quadrants   Musculoskeletal: Extremities normal, atraumatic   Skin: Skin color, texture, turgor normal   Neurologic: Alert and oriented X 3, Moves all 4 extremities      Psych: Affect is appropriate      Imaging: Reviewed I have personally reviewed pertinent labs, tests, and radiologic imaging in patient's chart.  US Lower Extremity Venous Duplex Bilateral    Result Date: 6/15/2023  EXAM: US LOWER EXTREMITY VENOUS DUPLEX BILATERAL LOCATION: [facility] DATE: 6/15/2023 INDICATION: Shortness of breath and edema. COMPARISON: None. TECHNIQUE: Venous Duplex ultrasound of bilateral lower extremities with and without compression, augmentation and duplex. Color flow and spectral Doppler with waveform analysis performed. FINDINGS: Exam includes the common femoral, femoral, popliteal veins as well as segmentally visualized deep calf veins and greater saphenous vein. Linear and curved transducers used due to body habitus. RIGHT: No deep vein thrombosis. No superficial thrombophlebitis. No popliteal cyst. LEFT: No deep " vein thrombosis. No superficial thrombophlebitis. No popliteal cyst.     IMPRESSION: 1.  No deep venous thrombosis in either leg.    CT Chest w/o Contrast    Result Date: 6/15/2023  EXAM: CT CHEST W/O CONTRAST LOCATION: [facility] DATE: 6/15/2023 INDICATION: SOB s p lung  biopsy  COMPARISON: CT 06/12/2023 TECHNIQUE: CT chest without IV contrast. Multiplanar reformats were obtained. Dose reduction techniques were used. CONTRAST: None. FINDINGS: LUNGS AND PLEURA: Similar severe bullous emphysematous changes. Multiple bilateral pulmonary nodules reidentified. Postbiopsy changes of the right lung, without evidence of pneumothorax. MEDIASTINUM/AXILLAE: Bilateral gynecomastia. Diffuse chest wall edema. Stable benign 1.9 cm cyst adjacent to the right first costochondral joint, perhaps a synovial cyst. No thoracic adenopathy. Normal heart size and no pericardial effusion. Dilated central pulmonary arteries suggesting pulmonary artery hypertension. Stable dilated esophagus which could reflect achalasia. CORONARY ARTERY CALCIFICATION: Mild. UPPER ABDOMEN: No significant finding. MUSCULOSKELETAL: Multilevel degenerative changes of the thoracic spine. No acute osseous lesion or suspicious bony abnormality.     IMPRESSION: 1.  Similar severe bullous emphysematous changes. Multiple bilateral pulmonary nodules reidentified. Postbiopsy changes of the right lung, without evidence of pneumothorax. 2.  No other acute process in the chest.     XR Chest Port 1 View    Result Date: 6/15/2023  EXAM: XR CHEST PORT 1 VIEW LOCATION: [facility] DATE: 6/15/2023 INDICATION: SOB recent lung biopy COMPARISON: Portable chest radiograph 06/14/2023     IMPRESSION: Heart size and vascularity are normal. Shallow inspiration. Postbiopsy changes are present within the right lung base with stable opacity of the right lower lobe. No pneumothorax. Possible trace effusions. No acute osseous  abnormality.    XR Chest 1 View    Result Date: 6/14/2023  EXAM: XR CHEST 1 VIEW LOCATION: [facility] DATE: 6/14/2023 INDICATION: Status post right lung biopsy COMPARISON: CT biopsy 06/14/2023     IMPRESSION: Heart size and vascularity are normal. Shallow inspiration. Postbiopsy changes are present within the right lung base with slight increased opacity medial right lower lobe. No pneumothorax nor gross pleural effusion.    CT Lung Mediastinum Biopsy    Result Date: 6/14/2023  EXAM: 1. PERCUTANEOUS BIOPSY RIGHT LOWER LOBE NODULE 2. CT GUIDANCE 3. CONSCIOUS SEDATION LOCATION: [facility] DATE: 6/14/2023 INDICATION: renal cancer. lung nodules TECHNIQUE: Dose reduction techniques were used. PROCEDURE: Informed consent obtained. Site marked. Prior images reviewed. Required items made available. Patient identity confirmed verbally and with arm band. Patient reevaluated immediately before administering sedation. Universal protocol was followed. Time out performed. The site was prepped and draped in sterile fashion. 5 mL of 1% lidocaine was infused into the local soft tissues. Using standard technique and under direct CT guidance, a 20-gauge biopsy device was used to obtain three core biopsies. Tissue was submitted to Pathology and was adequate by preliminary review by a pathologist. The patient tolerated the procedure well. No immediate complications. SEDATION: Versed 1  mg. Fentanyl 50 mcg. The procedure was performed with administration intravenous conscious sedation with appropriate preoperative, intraoperative, and postoperative evaluation. 30  minutes of supervised face to face conscious sedation time was provided by a radiology nurse under my direct supervision.     IMPRESSION: 1.  Successful CT-guided biopsy of the right lower lobe nodule with moderate sedation . Reference CPT Codes: 05104, 09791, 80399, 62096    XR Thoracic Spine 2 Views    Result Date:  6/12/2023  EXAM: XR THORACIC SPINE 2 VIEWS, XR LUMBAR SPINE 2/3 VIEWS LOCATION: [facility] DATE: 6/12/2023 INDICATION: acute low backache COMPARISON: None.     IMPRESSION: Thoracic spine: Mild thoracic kyphosis. The vertebral bodies of the thoracic spine otherwise have normal stature and alignment without evidence of compression fracture. Anterior marginal osteophytes within the mid and lower thoracic spine. Multilevel degenerative disc disease of the lumbar spine with disc height loss, most pronounced within the mid and lower thoracic spine. The partially imaged lungs are unremarkable. Soft tissues unremarkable. Lumbar spine: 5 lumbar-type vertebral bodies. Postsurgical changes posterior fusion at L4/L5. No hardware complication. Anterolisthesis of L4 and L5 measuring 5 mm. Multilevel degenerative disc disease of the lumbar lumbar spine with disc height loss, most pronounced at L4/L5 and L5/S1. Anterior marginal osteophytes at L4/L5 and L5/S1. Atherosclerotic vascular disease. Soft tissues otherwise unremarkable.     XR Lumbar Spine 2/3 Views    Result Date: 6/12/2023  EXAM: XR THORACIC SPINE 2 VIEWS, XR LUMBAR SPINE 2/3 VIEWS LOCATION: [facility] DATE: 6/12/2023 INDICATION: acute low backache COMPARISON: None.     IMPRESSION: Thoracic spine: Mild thoracic kyphosis. The vertebral bodies of the thoracic spine otherwise have normal stature and alignment without evidence of compression fracture. Anterior marginal osteophytes within the mid and lower thoracic spine. Multilevel degenerative disc disease of the lumbar spine with disc height loss, most pronounced within the mid and lower thoracic spine. The partially imaged lungs are unremarkable. Soft tissues unremarkable. Lumbar spine: 5 lumbar-type vertebral bodies. Postsurgical changes posterior fusion at L4/L5. No hardware complication. Anterolisthesis of L4 and L5 measuring 5 mm. Multilevel degenerative disc  disease of the lumbar lumbar spine with disc height loss, most pronounced at L4/L5 and L5/S1. Anterior marginal osteophytes at L4/L5 and L5/S1. Atherosclerotic vascular disease. Soft tissues otherwise unremarkable.     CT Chest Abdomen Pelvis w/o Contrast    Result Date: 6/12/2023  EXAM: CT CHEST ABDOMEN PELVIS W/O CONTRAST LOCATION: [facility] DATE/TIME: 6/12/2023 2:59 PM CDT INDICATION: abd pain, palpable hernia midline ventral, some chronic back pain known renal mass with hematuria, Oncology has also ordered this study for 2 days from now COMPARISON: CT chest 09/04/2019 TECHNIQUE: CT scan of the chest, abdomen, and pelvis was performed without IV contrast. Multiplanar reformats were obtained. Dose reduction techniques were used. CONTRAST: None. FINDINGS: LUNGS AND PLEURA: The central airways are clear. Mild bronchial wall thickening. Similar severe bullous emphysematous changes. Multiple new bilateral multilobar pulmonary nodules. This includes a newly visualized 10 x 12 mm solid right lower lobe nodule image 202 series 4. Stable 6 mm right lower lobe nodule measuring 12. New 7 mm left lower lobe nodule image 114. New 12 mm nodule image 196. Multiple additional nodules. No pleural effusion. MEDIASTINUM/AXILLAE: Bilateral gynecomastia. Diffuse chest wall edema. Stable benign 1.9 cm cyst adjacent to the right first costochondral joint, perhaps a synovial cyst. No thoracic adenopathy. Normal heart size and no pericardial effusion. Dilated central pulmonary arteries suggesting pulmonary artery hypertension. Stable dilated esophagus which could reflect achalasia. CORONARY ARTERY CALCIFICATION: Mild. HEPATOBILIARY: Normal. PANCREAS: Normal. SPLEEN: Normal. ADRENAL GLANDS: Stable left adrenal gland thickening. KIDNEYS/BLADDER: Limited evaluation due to the lack of contrast. Masslike enlargement of the mid to upper right kidney with effacement of the hilum. This masslike area measures 8.3 x 8.2  x 6.0 cm. 2.4 cm right mid renal cyst. No follow-up is indicated. No hydronephrosis or hydroureter. BOWEL: Normal caliber small bowel and colon. Apparent anastomotic suture line within a colonic segment in the mid abdomen. Post ventral abdominal wall hernia repair with diastases recti and anterior bulging of intra-abdominal contents below the mesh material. Associated 1.0 x 3.2 cm fluid collection below the mesh material, likely a chronic seroma. Small fat-containing hernia above the mesh material. Mild surrounding fat stranding is likely chronic. Tiny right inguinal hernia containing fat and trace fluid. No free air. LYMPH NODES: No thoracic adenopathy. VASCULATURE: Mild to moderate aortoiliac atherosclerosis. PELVIC ORGANS: Moderate prostatomegaly. Presacral edema. No pelvic free fluid. MUSCULOSKELETAL: Spinal and pelvic degenerative changes. L4-L5 laminectomy and fusion. No definite suspicious osseous lesion.     IMPRESSION: 1.  Masslike enlargement of the right mid to upper kidney highly suspicious for a primary renal cell carcinoma. Note that evaluation of the kidneys is limited due to the lack of contrast. 2.  Multiple bilateral pulmonary nodules likely representing pulmonary metastases. 3.  Spinal and pelvic degenerative changes. No definite suspicious osseous lesion. 4.   Post mid ventral abdominal hernia repair with a small fat-containing hernia above the mesh material. A small presumed chronic seroma is noted below the mesh material. Diastases recti and anterior bulging of the mesh material are also noted.    Head CT w/o contrast    Result Date: 6/12/2023  EXAM: CT HEAD W/O CONTRAST LOCATION: [facility] DATE/TIME: 6/12/2023 2:56 PM CDT INDICATION: fall COMPARISON: 11/02/2022 TECHNIQUE: Routine CT Head without IV contrast. Multiplanar reformats. Dose reduction techniques were used. FINDINGS: INTRACRANIAL CONTENTS: No intracranial hemorrhage, extraaxial collection, or mass effect.   No CT evidence of acute infarct. Mild presumed chronic small vessel ischemic changes. Mild generalized volume loss. No hydrocephalus. VISUALIZED ORBITS/SINUSES/MASTOIDS: Prior bilateral cataract surgery. Visualized portions of the orbits are otherwise unremarkable. No paranasal sinus mucosal disease. No middle ear or mastoid effusion. BONES/SOFT TISSUES: No acute abnormality.     IMPRESSION: 1.  No CT evidence for acute intracranial process. 2.  Brain atrophy and presumed chronic microvascular ischemic changes as above.      Labs: Reviewed I have personally reviewed pertinent labs, tests, and radiologic imaging in patient's chart.  Recent Results (from the past 24 hour(s))   ECG 12-LEAD WITH MUSE ([facility])    Collection Time: 06/14/23 11:11 AM   Result Value Ref Range    Systolic Blood Pressure  mmHg    Diastolic Blood Pressure  mmHg    Ventricular Rate 75 BPM    Atrial Rate 75 BPM    VT Interval 142 ms    QRS Duration 98 ms     ms    QTc 419 ms    P Axis 63 degrees    R AXIS 12 degrees    T Axis 80 degrees    Interpretation ECG       Sinus rhythm  Normal ECG  When compared with ECG of 12-JUN-2023 13:30,  No significant change was found  Confirmed by REGGIE KNAPP [facility] LOC: ([id number]) on 6/14/2023 2:40:59 PM     Troponin I    Collection Time: 06/14/23 12:11 PM   Result Value Ref Range    Troponin I 0.04 0.00 - 0.29 ng/mL   Troponin I    Collection Time: 06/15/23 12:53 AM   Result Value Ref Range    Troponin I 0.05 0.00 - 0.29 ng/mL   Blood gas venous    Collection Time: 06/15/23 12:53 AM   Result Value Ref Range    pH Venous 7.35 7.35 - 7.45    pCO2 Venous 60 (H) 35 - 50 mm Hg    pO2 Venous 63 (H) 25 - 47 mm Hg    Bicarbonate Venous 33 (H) 24 - 30 mmol/L    Base Excess/Deficit (+/-) 6.8   mmol/L    Oxyhemoglobin Venous 90.6 (H) 70.0 - 75.0 %    O2 Sat, Venous 92.4 (H) 70.0 - 75.0 %   Comprehensive metabolic panel    Collection Time: 06/15/23 12:53 AM   Result Value Ref Range    Sodium 144 136 - 145 mmol/L    Potassium  5.0 3.5 - 5.0 mmol/L    Chloride 101 98 - 107 mmol/L    Carbon Dioxide (CO2) 32 (H) 22 - 31 mmol/L    Anion Gap 11 5 - 18 mmol/L    Urea Nitrogen 41 (H) 8 - 28 mg/dL    Creatinine 2.21 (H) 0.70 - 1.30 mg/dL    Calcium 10.0 8.5 - 10.5 mg/dL    Glucose 134 (H) 70 - 125 mg/dL    Alkaline Phosphatase 64 45 - 120 U/L    AST 23 0 - 40 U/L    ALT 12 0 - 45 U/L    Protein Total 7.2 6.0 - 8.0 g/dL    Albumin 3.8 3.5 - 5.0 g/dL    Bilirubin Total 0.5 0.0 - 1.0 mg/dL    GFR Estimate 29 (L) >60 mL/min/1.73m2   Magnesium    Collection Time: 06/15/23 12:53 AM   Result Value Ref Range    Magnesium 2.5 1.8 - 2.6 mg/dL   B-Type Natriuretic Peptide ([facility])    Collection Time: 06/15/23 12:53 AM   Result Value Ref Range     (H) 0 - 91 pg/mL   CBC with platelets and differential    Collection Time: 06/15/23 12:53 AM   Result Value Ref Range    WBC Count 7.4 4.0 - 11.0 10e3/uL    RBC Count 3.09 (L) 4.40 - 5.90 10e6/uL    Hemoglobin 9.0 (L) 13.3 - 17.7 g/dL    Hematocrit 29.2 (L) 40.0 - 53.0 %    MCV 95 78 - 100 fL    MCH 29.1 26.5 - 33.0 pg    MCHC 30.8 (L) 31.5 - 36.5 g/dL    RDW 16.0 (H) 10.0 - 15.0 %    Platelet Count 182 150 - 450 10e3/uL    % Neutrophils 74 %    % Lymphocytes 16 %    % Monocytes 9 %    % Eosinophils 0 %    % Basophils 0 %    % Immature Granulocytes 1 %    NRBCs per 100 WBC 0 <1 /100    Absolute Neutrophils 5.5 1.6 - 8.3 10e3/uL    Absolute Lymphocytes 1.2 0.8 - 5.3 10e3/uL    Absolute Monocytes 0.6 0.0 - 1.3 10e3/uL    Absolute Eosinophils 0.0 0.0 - 0.7 10e3/uL    Absolute Basophils 0.0 0.0 - 0.2 10e3/uL    Absolute Immature Granulocytes 0.1 <=0.4 10e3/uL    Absolute NRBCs 0.0 10e3/uL   Glucose by meter    Collection Time: 06/15/23  8:07 AM   Result Value Ref Range    GLUCOSE BY METER POCT 89 70 - 99 mg/dL       Total time spent 65 minutes with greater than 50% in consultation, education and coordination of care.     Also discussed with RN, MD, pharmacist.     Please see A&P for additional details of  medical decision making.    Elements of Medical Decision Making as described above. High level of decision making required due to 1 or more chronic illness with severe exacerbation, progression, and side effects of treatment.  High risk therapy including opioids, high risk drug therapy including oral and/or parenteral controlled substances    Thank you for this consultation.    Jennifer MORAN, FNP-C  Acute Care Pain Management Program  [facility] ([facility], [place], [place])  Monday-Friday 8a-4p   Page via online paging system or [email or web address]

## 2023-06-15 NOTE — PROGRESS NOTES
Patient BP increasing despite PRN and scheduled hydralazine. Currently 198/ 79. Remote cross cover paged.

## 2023-06-15 NOTE — PROGRESS NOTES
[facility]    Medicine Progress Note - Hospitalist Service    Date of Admission:  6/12/2023    Assessment & Plan   83-year-old with history of chronic kidney disease, hypertension, peripheral neuropathy, previous ventral hernia repair with mesh, chronic respiratory failure on supplemental oxygen and COPD, who presents after a fall on 6/12/2023.  Patient was found on the floor in his home.  Patient complained of gross hematuria for several months.  Patient had known right renal mass with previous biopsy which indicated benign mass and incidental finding of pulmonary nodules.  Admitted for gross hematuria, generalized weakness, fall, progression and chronic anemia likely secondary to continued gross hematuria.  CT abdomen revealed ventral abdominal hernia.  CT guided biopsy of pulmonary nodule performed on 6/14/23.  Patient developed hypertensive urgency, increased shortness of breath and atypical chest pain on 6/14 and overnight received IV furosemide with some improvement in symptoms.  Oncology, urology following.      Gross hematuria  Chronic normocytic anemia  Hemoglobin 9.0 g/dL stable.   Order Iron studies iron sat low at 40, ferritin normal at 245, TIBC low 208 reticulocyte count, iron sat index normal at 19. Vitamin b12 level normal at 454.   CBC daily.   Transfuse for hemoglobin < 7 g/dL.   Urology and oncology consult appreciated.   See Urology follow up recommendation below.      Right Renal oncocytic tumor grade 2   CT abdomen on 6/12/23 with enlarging renal mass 8.3 cm.  Core biopsy on 9/29/22 of right renal mass.   Follow up with Dr. Erickson 7/2023 for further discussion for renal mass management.     Pulmonary nodules.   Suspicion for metastatic renal cell cancer.   Status post CT guided biopsy on 6/14/23.   Pathology is pending.   Oncology following.     Atypical chest pain  Abdominal pain  Ventral abdominal hernia  History of hernia repair with mesh.   General surgery was  consulted, no surgical intervention recommended at this time.   Continue tylenol, hydromorphone 2-4 mg q4h prn  hydromorphone 0.5 mg IV q3h prn for uncontrolled pain.  Outpatient follow up with hernia specialist in future.     Shortness of breath, edema.   Chronic respiratory failure with hypoxia  COPD  CT chest severe emphysema, bilateral pulmonary nodules.  BNP elevated 545, likely acute COPD exacerbation.   Continue home inhalers and supplemental oxygen at 2L/min.  Order methylprednisolone 40 mg IV q8h.   Lower extremity US negative for DVT.   Echocardiogram in process.   Order d-dimer.   Order furosemide 40 mg IV q12h.   Order magnesium and potassium replacement protocol.    Acute kidney injury on Chronic kidney disease stage 3b.   Mild hyperkalemia  Creatinine baseline 2.0, max 2.42 on admission. Now 2.2.   Potassium 5.2->4.6->5.0.   Discontinue IV hydration  ml/hr.   Hold lisinopril.    Hypertensive urgency - improved.  Home PTA medications: atenolol 25 mg daily,   Hold Lisinopril 20 mg daily for ELISABETH, hyperkalemia.   Continue Amlodipine 5 mg po daily.  Start hydralazine 25 mg po TID  hydralazine 10 mg IV q6h prn for SBP > 160 mmHg.      Chest pain, upper abdominal pain.   Atypical for cardiac pain, palpation reproduces pain.   Optimize pain management, ask pain service to see.   EKG, CXR, troponin I unremarkable.      Constipation  Bisacodyl suppository 10 mg rectal daily prn.   Miralax 17 g po BID.  Senna docusate BID     Fall initial encounter  Degenerative disc disease lumbar spine.   Coccyx pain  CT head negative for acute pathology.  Xray thoracic and lumbar spine with previous posterior fusion L4-L5 and no complication of hardware, no acute fracture.  Multilevel degenerative disc disease noted.   PT and OT evaluation  Continue pain control as above.      Hyperlipidemia  Continue home statin.      Peripheral neuropathy  gabapentin 300 mg BID prn.      Sleep apnea  Non compliant with CPAP       Diet:  "Regular Diet Adult Thin Liquids (level 0)    DVT Prophylaxis: PCD, holding for gross hematuria.   Hill Catheter: Not present  Lines: None     Cardiac Monitoring: None  Code Status: Full Code      Clinically Significant Risk Factors                         # Obesity: Estimated body mass index is 31.03 kg/m  as calculated from the following:    Height as of this encounter: 1.905 m (6' 3\").    Weight as of this encounter: 112.6 kg (248 lb 3.8 oz)., PRESENT ON ADMISSION          Disposition Plan      Expected Discharge Date: 06/16/2023      Destination: home with family  Discharge Comments: Rt lung bx  Anticiapte home with HC  Potential need for Palliative  needing liver biopsy with IR? r/t new lesions          Gucci Hall DO  Hospitalist Service  [facility]  Securely message with [employer] (more info)  Text page via [facility] Paging/Directory   ______________________________________________________________________    Interval History   Shortness of breath overnight, still has substernal chest pain off and on.  Currently minimal pain.  He denies cough.  Denies orthopnea or PND.  Continues to have gross hematuria.     Physical Exam   Vital Signs: Temp: 98.3  F (36.8  C) Temp src: Oral BP: (!) 151/69 Pulse: 75   Resp: 30 SpO2: 98 % O2 Device: Nasal cannula Oxygen Delivery: 2 LPM  Weight: 248 lbs 3.81 oz  GENERAL: The patient is not in any acute distressed. Awake and alert.  HEENT: Nonicteric sclerae, PERRLA, EOMI. Oropharynx clear. Moist mucous membranes. Conjunctivae appear well perfused.  HEART: Regular rate and rhythm without murmurs.  CHEST: tenderness to palpation over lower strernum.  LUNGS: Clear to auscultation bilaterally. Positive wheezing.  ABDOMEN: Soft, positive bowel sounds, tender over epigastric area.  SKIN: No rash, no excessive bruising, petechiae, or purpura.  EXTREMITIES : no rashes, no swelling in legs.  NEUROLOGIC: conscious and oriented, follows commands, no obvious focal " deficits.    Medical Decision Making   55 MINUTES SPENT BY ME on the date of service doing chart review, history, exam, documentation & further activities per the note.      Data     I have personally reviewed the following data over the past 24 hrs:    7.4  \   9.0 (L)   / 182     144 101 41 (H) /  89   5.0 32 (H) 2.21 (H) \       ALT: 12 AST: 23 AP: 64 TBILI: 0.5   ALB: 3.8 TOT PROTEIN: 7.2 LIPASE: N/A       Trop: N/A BNP: 545 (H)       Ferritin:  N/A % Retic:  N/A LDH:  N/A       Imaging results reviewed over the past 24 hrs:   Recent Results (from the past 24 hour(s))   XR Chest 1 View    Narrative    EXAM: XR CHEST 1 VIEW  LOCATION: [facility]  DATE: 6/14/2023    INDICATION: Status post right lung biopsy  COMPARISON: CT biopsy 06/14/2023      Impression    IMPRESSION: Heart size and vascularity are normal. Shallow inspiration. Postbiopsy changes are present within the right lung base with slight increased opacity medial right lower lobe. No pneumothorax nor gross pleural effusion.   XR Chest Port 1 View    Narrative    EXAM: XR CHEST PORT 1 VIEW  LOCATION: [facility]  DATE: 6/15/2023    INDICATION: SOB recent lung biopy  COMPARISON: Portable chest radiograph 06/14/2023      Impression    IMPRESSION: Heart size and vascularity are normal. Shallow inspiration. Postbiopsy changes are present within the right lung base with stable opacity of the right lower lobe. No pneumothorax. Possible trace effusions. No acute osseous abnormality.   CT Chest w/o Contrast    Narrative    EXAM: CT CHEST W/O CONTRAST  LOCATION: [facility]  DATE: 6/15/2023    INDICATION: SOB s p lung  biopsy    COMPARISON: CT 06/12/2023  TECHNIQUE: CT chest without IV contrast. Multiplanar reformats were obtained. Dose reduction techniques were used.  CONTRAST: None.    FINDINGS:   LUNGS AND PLEURA: Similar severe bullous emphysematous changes. Multiple bilateral  pulmonary nodules reidentified. Postbiopsy changes of the right lung, without evidence of pneumothorax.    MEDIASTINUM/AXILLAE: Bilateral gynecomastia. Diffuse chest wall edema. Stable benign 1.9 cm cyst adjacent to the right first costochondral joint, perhaps a synovial cyst. No thoracic adenopathy. Normal heart size and no pericardial effusion. Dilated   central pulmonary arteries suggesting pulmonary artery hypertension. Stable dilated esophagus which could reflect achalasia.     CORONARY ARTERY CALCIFICATION: Mild.    UPPER ABDOMEN: No significant finding.    MUSCULOSKELETAL: Multilevel degenerative changes of the thoracic spine. No acute osseous lesion or suspicious bony abnormality.      Impression    IMPRESSION:   1.  Similar severe bullous emphysematous changes. Multiple bilateral pulmonary nodules reidentified. Postbiopsy changes of the right lung, without evidence of pneumothorax.  2.  No other acute process in the chest.     US Lower Extremity Venous Duplex Bilateral    Narrative    EXAM: US LOWER EXTREMITY VENOUS DUPLEX BILATERAL  LOCATION: [facility]  DATE: 6/15/2023    INDICATION: Shortness of breath and edema.  COMPARISON: None.  TECHNIQUE: Venous Duplex ultrasound of bilateral lower extremities with and without compression, augmentation and duplex. Color flow and spectral Doppler with waveform analysis performed.    FINDINGS: Exam includes the common femoral, femoral, popliteal veins as well as segmentally visualized deep calf veins and greater saphenous vein.     Linear and curved transducers used due to body habitus.    RIGHT: No deep vein thrombosis. No superficial thrombophlebitis. No popliteal cyst.    LEFT: No deep vein thrombosis. No superficial thrombophlebitis. No popliteal cyst.      Impression    IMPRESSION:  1.  No deep venous thrombosis in either leg.

## 2023-06-15 NOTE — SIGNIFICANT EVENT
RN requested evaluation - Patient was tachypneic    Seen bedside. Laying on his side. O2 sat 96% on 2L  SOB - Just had a lung biopsy - STAT chest XR/followed by chest CT --- no evidence of Pneumothorax     (+) Rales - given 20 mgs lasix - (+) Diuresis. BNP over 500. ECHO in AM    CT chest also showed -- severe bullous emphysematous changes. Multiple bilateral pulmonary nodules. No evidence of PNA ---- was given nebs given wheezing    O2 needs remain 2L. Not tachycardic - HR 70s. Low suspicion for PE at this point.    Additional pain medication was also given - feels comfortable after    Will inform AM doc

## 2023-06-15 NOTE — CONSULTS
Clinical Nutrition Services - Education Note      Assessed learning needs, learning preferences, and willingness to learn    Nutrition Education (Content): 2g sodium diet  a) Provided handout on low sodium nutrition therapy  b) Discussed food and diet recommendations    Nutrition Education (Application):  a) Discussed eating habits and recommended alternative food choices    Patient verbalizes understanding of diet by stating that he will cut out salt and avoid foods high in sodium.    Anticipate good compliance    Diet Education - refer to Education Flowsheet    Christal Mendez RDN, LD

## 2023-06-15 NOTE — PLAN OF CARE
Problem: Fatigue (Oncology Care)  Goal: Improved Activity Tolerance  Outcome: Not Progressing     Problem: Pain Acute (Oncology Care)  Goal: Optimal Pain Control  Outcome: Not Progressing   Goal Outcome Evaluation: patient a/o x 3. Disorientated to situation. Had hypertension in the afternoon. HTN treated with PRN hydralazine. Was c/o severe pain 9/10, pain being managed with PRN IV dilaudid. Denies nausea or vomiting. Male purewick in place, draining dark brown urine. Patient bladder scanned for a max volume of 71ml. Biopsy site bandaid changed per order. Lungs sounds diminished, R/L lower lobes- expiratory wheezing. Maintaining 02 saturation in the mid- high 90's on 2L 02.. Patient having HTN, given PRN hydralizine, with minimal effects. MD paged.

## 2023-06-15 NOTE — PLAN OF CARE
Problem: Pain Acute (Oncology Care)  Goal: Optimal Pain Control  Outcome: Progressing  Intervention: Develop Pain Management Plan  Recent Flowsheet Documentation  Taken 6/14/2023 1925 by Faby Newton RN  Pain Management Interventions: medication (see MAR)  Intervention: Prevent or Manage Pain  Recent Flowsheet Documentation  Taken 6/14/2023 2046 by Faby Newton RN  Sensory Stimulation Regulation:   care clustered   lighting decreased   quiet environment promoted  Medication Review/Management: medications reviewed     Problem: Fatigue (Oncology Care)  Goal: Improved Activity Tolerance  Outcome: Progressing  Intervention: Promote Improved Energy  Recent Flowsheet Documentation  Taken 6/14/2023 1925 by Faby Newton RN  Activity Management:   up to bedside commode   back to bed   Goal Outcome Evaluation: Patient A&O but sleepy, resting between cares. States he has pain 'everywhere' including in chest but cannot describe or localize. He has had a cardiac/chest pain workup today. His respiratory rate is 20 and his O2 sats are 97% on 1 LPM but he has increased work of breathing with accessory muscle use. Lung sounds diminished. He states he has trouble breathing but that he always has trouble breathing. Will monitor. Urine is very dark/wine red, bladder scan PVR low.

## 2023-06-15 NOTE — PROGRESS NOTES
Place of Service:  [facility]     Reason for follow up: Renal mass    SUBJECTIVE:  Events:     Patient reports he is tired and feeling weak. Working with PT. Reports short of breath this morning. Feels like he is emptying his bladder and denies pain with voiding. Denies flank pain.     OBJECTIVE:  PHYSICAL EXAM:  Temp: 98.3  F (36.8  C) Temp src: Oral BP: (!) 151/69 Pulse: 75   Resp: 30 SpO2: 98 % O2 Device: Nasal cannula Oxygen Delivery: 2 LPM  General: NAD, alert, cooperative  Head: normocephalic, without abnormality / atraumatic  Abdomen: soft, nontender tender, nondistended distended.  No suprapubic fullness, no suprapubic tenderness.  No CVA tenderness  : external catheter in place draining blood tinged urine without clot or sediment.   Skin: No rashes or lesions  Musculoskeletal: moves all four extremities equally; no calf edema or tenderness  Psychological: alert and oriented, answers questions appropriately    LABS:  Creatinine   Date Value Ref Range Status   06/15/2023 2.21 (H) 0.70 - 1.30 mg/dL Final     WBC Count   Date Value Ref Range Status   06/15/2023 7.4 4.0 - 11.0 10e3/uL Final     Hemoglobin   Date Value Ref Range Status   06/15/2023 9.0 (L) 13.3 - 17.7 g/dL Final   ]  Platelet Count   Date Value Ref Range Status   06/15/2023 182 150 - 450 10e3/uL Final       Lab Results: personally reviewed.     ASSESSMENT/PLAN:  Soto Gibbs is being seen by [facility] for enlarging right renal mass. He has history of biopsy-proven right kidney cancer (8/25/2022) for which patient and/or daughter previously declined nephrectomy.    - Right renal mass enlarging from previous imaging, now 8.3 x 8.2 x 6.0 cm on 6/12 CT. US guided bx on 8/25/22: Renal oncocytic tumor, histologic Grade 2. Now with stable gross hematuria most likely from renal mass.   - Hgb stable at 9.0.    - Creatinine at 2.21. Monitor.   - Monitor PVRs/bladder scans, no concerns for clot retention at this time or for unstable  bleeding. Urine remains blood tinged in external catheter.   - No urgent surgical urologic intervention indicated.   - Maintain follow up appt with Dr. Erickson 7/20/23 to further discuss options for renal mass management.   - PT evaluation recommends discharge to TCU.   - Urology will sign off. Please do not hesitate to call or re consult with urological concerns.     Melina Holbrook PA-C  [facility]   [phone]

## 2023-06-16 NOTE — PLAN OF CARE
Goal Outcome Evaluation:  Pt hypertensive at beginning of shift 174/73. PRN hydralazine given. Otherwise VSS on 2L of NC. Pt uses oxygen at home (baseline). Pain managed with oral and IV dilaudid. Pt requested to sleep as much as possible throughout shift-- cares and assessments clustered. No BM this shift. Pt able to change position independently and as needed.

## 2023-06-16 NOTE — PROGRESS NOTES
[facility]    Medicine Progress Note - Hospitalist Service    Date of Admission:  6/12/2023    Assessment & Plan   83-year-old with history of chronic kidney disease, hypertension, peripheral neuropathy, previous ventral hernia repair with mesh, chronic respiratory failure on supplemental oxygen and COPD, who presents after a fall on 6/12/2023.  Patient was found on the floor in his home.  Patient complained of gross hematuria for several months.  Patient had known right renal mass with previous biopsy which indicated benign mass and incidental finding of pulmonary nodules.  Admitted for gross hematuria, generalized weakness, fall, progression and chronic anemia likely secondary to continued gross hematuria.  CT abdomen revealed ventral abdominal hernia.  CT guided biopsy of pulmonary nodule performed on 6/14/23.  Patient developed hypertensive urgency, increased shortness of breath and atypical chest pain on 6/14.  Elevated D-dimer on 6/15/23.     Heart failure with preserved ejection fraction.   I/O -930 mL, weight down from 113 kg to 106 kg.  Transthoracic echocardiogram LVEF hyperdynamic 65-70%, no WMA.  Start furosemide 40 mg po daily on 6/17/23.   magnesium and potassium replacement protocol.    Pulmonary nodules.   Suspicion for metastatic renal cell cancer.   Status post CT guided biopsy on 6/14/23.   Pathology is pending.   Oncology following.     Abdominal pain  Ventral abdominal hernia  History of hernia repair with mesh.   General surgery was consulted, no surgical intervention recommended at this time.   Continue tylenol, hydromorphone 2-4 mg q4h prn  hydromorphone 0.5 mg IV q3h prn for uncontrolled pain.  Outpatient follow up with hernia specialist in future.   Order lactic acid.     Chronic respiratory failure with hypoxia  COPD  Elevated d-dimer  CT chest severe emphysema, bilateral pulmonary nodules.  BNP elevated 545, likely acute COPD exacerbation.   Continue home  inhalers and supplemental oxygen at 2L/min  Start prednisone 40 mg daily.  Lower extremity US negative for DVT.   NM VQ scan pending.     Acute kidney injury on Chronic kidney disease stage 3b.   Mild hyperkalemia  Creatinine baseline 2.0, max 2.42 on admission. Now 2.5.   Potassium 5.2->4.6->5.2.  Hold lisinopril.  Stop IV furosemide.   Order UA    Gross hematuria  Chronic normocytic anemia  Anemia of chronic disease  Hemoglobin 9.0 g/dL stable.   Iron studies iron sat low at 40, ferritin normal at 245, TIBC low 208 reticulocyte count, iron sat index normal at 19. Vitamin b12 level normal at 454.   CBC daily.   Transfuse for hemoglobin < 7 g/dL.   See Urology follow up recommendation below.     Hypertensive urgency - improved.  Home PTA medications: atenolol 25 mg daily,   Hold Lisinopril 20 mg daily for ELISABETH, hyperkalemia.   Increase Amlodipine 10 mg po daily.  Continue hydralazine 50 mg po TID  hydralazine 10 mg IV q6h prn for SBP > 160 mmHg.      Chest pain atypical.   Atypical for cardiac pain, palpation reproduces pain.   Optimize pain management, ask pain service to see.   EKG, CXR, troponin I unremarkable.   Believe most of pain is associated with ventral hernia      Right Renal oncocytic tumor grade 2   CT abdomen on 6/12/23 with enlarging renal mass 8.3 cm.  Core biopsy on 9/29/22 of right renal mass.   Follow up with Dr. Erickson 7/2023 for further discussion for renal mass management.     Constipation  Bisacodyl suppository 10 mg rectal daily prn.   Miralax 17 g po BID.  Senna docusate BID     Fall initial encounter  Degenerative disc disease lumbar spine.   Coccyx pain  CT head negative for acute pathology.  Xray thoracic and lumbar spine with previous posterior fusion L4-L5 and no complication of hardware, no acute fracture.  Multilevel degenerative disc disease noted.   PT and OT evaluation  Continue pain control as above.      Hyperlipidemia  Continue home statin.      Peripheral neuropathy  gabapentin  "300 mg BID prn.      Sleep apnea  Non compliant with CPAP     Diet: 2 Gram Sodium Diet    DVT Prophylaxis: Pneumatic Compression Devices  Hill Catheter: Not present  Lines: None     Cardiac Monitoring: ACTIVE order. Indication: Acute decompensated heart failure (48 hours)  Code Status: Full Code      Clinically Significant Risk Factors                         # Overweight: Estimated body mass index is 29.24 kg/m  as calculated from the following:    Height as of this encounter: 1.905 m (6' 3\").    Weight as of this encounter: 106.1 kg (233 lb 14.5 oz)., PRESENT ON ADMISSION          Disposition Plan      Expected Discharge Date: 06/17/2023      Destination: home with family  Discharge Comments: Rt lung bx  Anticiapte home with HC  Potential need for Palliative  needing liver biopsy with IR? r/t new lesions          Gucci Hall DO  Hospitalist Service  [facility]  Securely message with [email or web address] (more info)  Text page via [employer] Paging/Directory   ______________________________________________________________________    Interval History   Soto reports upper abdominal discomfort.  He has some pharyngeal burning and regurgitation with meals.  He denies any chest pain or shortness of breath at this time.  He is currently on 1 L/min via nasal cannula which is better than his baseline at 2 L/min.  He is saturating 96%.  He denies any fever or chills.  No diarrhea.  No hematochezia or melena.    Physical Exam   Vital Signs: Temp: 98.1  F (36.7  C) Temp src: Oral BP: (!) 155/67 Pulse: 72   Resp: 16 SpO2: 97 % O2 Device: Nasal cannula Oxygen Delivery: 2 LPM  Weight: 233 lbs 14.53 oz  GENERAL: The patient is not in any acute distressed. Awake and alert.  HEENT: Nonicteric sclerae, PERRLA, EOMI. Oropharynx clear. Moist mucous membranes. Conjunctivae appear well perfused.  HEART: Regular rate and rhythm without murmurs.  CHEST: tenderness to palpation over lower strernum.  LUNGS: Clear to " auscultation bilaterally. Positive wheezing.  ABDOMEN: Soft, positive bowel sounds, tender over epigastric area.  SKIN: No rash, no excessive bruising, petechiae, or purpura.  EXTREMITIES : no rashes, no swelling in legs.  NEUROLOGIC: conscious and oriented, follows commands, no obvious focal deficits.    Medical Decision Making   50 MINUTES SPENT BY ME on the date of service doing chart review, history, exam, documentation & further activities per the note.      Data     I have personally reviewed the following data over the past 24 hrs:    INR:  N/A PTT:  N/A   D-dimer:  2.98 (H) Fibrinogen:  N/A       Imaging results reviewed over the past 24 hrs:   Recent Results (from the past 24 hour(s))   US Lower Extremity Venous Duplex Bilateral    Narrative    EXAM: US LOWER EXTREMITY VENOUS DUPLEX BILATERAL  LOCATION: [facility]  DATE: 6/15/2023    INDICATION: Shortness of breath and edema.  COMPARISON: None.  TECHNIQUE: Venous Duplex ultrasound of bilateral lower extremities with and without compression, augmentation and duplex. Color flow and spectral Doppler with waveform analysis performed.    FINDINGS: Exam includes the common femoral, femoral, popliteal veins as well as segmentally visualized deep calf veins and greater saphenous vein.     Linear and curved transducers used due to body habitus.    RIGHT: No deep vein thrombosis. No superficial thrombophlebitis. No popliteal cyst.    LEFT: No deep vein thrombosis. No superficial thrombophlebitis. No popliteal cyst.      Impression    IMPRESSION:  1.  No deep venous thrombosis in either leg.   Echocardiogram Complete   Result Value    LVEF  65-70%    Narrative    [id number]  [id number]  [id number]  [id number]^RENE^[place] [address]     Name: BERHANE HECTOR  MRN: [id number]  : 1940  Study Date: 06/15/2023 08:11 AM  Age: 83 yrs  Gender: Male  Patient Location: [facility]  Reason For Study:  SOB  Ordering Physician: SARAH LÓPEZ  Performed By: ADAN     BSA: 2.4 m2  Height: 75 in  Weight: 248 lb  ______________________________________________________________________________  Procedure  Complete Portable Echo Adult.  ______________________________________________________________________________  Interpretation Summary     The left ventricle is normal in size.  The visual ejection fraction is 65-70%.  No regional wall motion abnormalities noted.  Diastolic Doppler findings (E/E' ratio and/or other parameters) suggest left  ventricular filling pressures are increased.  noncoronary and L cusps partially fused  IVC diameter >2.1 cm collapsing <50% with sniff suggests a high RA pressure  estimated at 15 mmHg or greater.  There is no pericardial effusion.  Sinus rhythm was noted.  The study was technically difficult. For better visualization consider cardiac  MRI.  ______________________________________________________________________________  Left Ventricle  The left ventricle is normal in size. Diastolic Doppler findings (E/E' ratio  and/or other parameters) suggest left ventricular filling pressures are  increased. The visual ejection fraction is 65-70%. No regional wall motion  abnormalities noted.     Right Ventricle  Normal right ventricle size and systolic function. TAPSE is abnormal, which is  consistent with abnormal right ventricular systolic function. The right  ventricular systolic function is normal.     Atria  The left atrium is not well visualized. Normal left atrial size. Right atrium  not well visualized.     Mitral Valve  The mitral valve leaflets appear thickened, but open well. There is mild (1+)  mitral regurgitation. There is no mitral valve stenosis.     Tricuspid Valve  The tricuspid valve is not well visualized. There is mild (1+) tricuspid  regurgitation. There is no tricuspid stenosis.     Aortic Valve  noncoronary and L cusps partially fused. No aortic regurgitation is  present.  No aortic stenosis is present.     Pulmonic Valve  The pulmonic valve is not well visualized. There is no pulmonic valvular  regurgitation. There is no pulmonic valvular stenosis.     Vessels  IVC diameter >2.1 cm collapsing <50% with sniff suggests a high RA pressure  estimated at 15 mmHg or greater.     Pericardium  There is no pericardial effusion.     Rhythm  Sinus rhythm was noted.  ______________________________________________________________________________  MMode/2D Measurements & Calculations  LA Volume Indexed (AL/bp): 25.9 ml/m2  RV Base: 3.2 cm  TAPSE: 1.6 cm     Time Measurements  MM HR: 71.0 BPM     Doppler Measurements & Calculations  MV E max derek: 114.0 cm/sec  MV A max derek: 120.8 cm/sec  MV E/A: 0.94  MV dec slope: 441.6 cm/sec2  MV dec time: 0.26 sec  LV V1 max P.6 mmHg  LV V1 max: 118.4 cm/sec  LV V1 VTI: 34.8 cm  PA acc time: 0.06 sec  E/E' av.3  Lateral E/e': 19.4  Medial E/e': 15.2  RV S Derek: 12.1 cm/sec     ______________________________________________________________________________  Report approved by: Ced Altamirano 06/15/2023 10:14 AM

## 2023-06-16 NOTE — PLAN OF CARE
Problem: Plan of Care - These are the overarching goals to be used throughout the patient stay.    Goal: Optimal Comfort and Wellbeing  Outcome: Progressing     Problem: Pain Acute (Oncology Care)  Goal: Optimal Pain Control  Outcome: Progressing   Goal Outcome Evaluation:    A&Ox4 and VSS besides BP (187/75), scheduled hydralazine given. Pt reported pain and was given Dilaudid at 0915 which was effective. Pt remains on 2L O2 and sats are in the mid 90s. Tele reading NSR. Pt still having hematuria, male purewick in place. UC pending. K and Mg protocol, K was 5.4 (sent FYI page to provider). 2g NA diet, alarms on for safety, assist of 1 with GB, and call light within reach. Discharge pending.

## 2023-06-16 NOTE — PROGRESS NOTES
[facility] ACUTE PAIN SERVICE     Assessment/Plan:     Soto Gibbs is a 83 year old male who was admitted on 6/12/2023.  Pain team was asked to see the patient for atypical chest pain. Admitted through ED presenting for evaluation of a mechanical fall. He reports falls at home and feeling increasingly weak over the last several months. He also reported in the ED of several months of ongoing hematuria. Urology consulted for enlarging right renal mass (He has history of biopsy-proven right kidney cancer (8/25/2022) for which patient and/or daughter previously declined nephrectomy) and Oncology consulted for metastatic renal cell carcinoma.      Opioid Induced Respiratory Depression Risk Assessment:?  Moderate   In the past 24 hours patient has received: 2 x 4mg PO Dilaudid, and 3 x 0.5mg IV Dilaudid, for MME = 60.  Sleeping x 2 this am, did speak to him this afternoon. Dilaudid is beneficial when given, feels on 'roller coaster' of pain, specifically when trying to catch up with IV. Discussed goals to utilize less IV and oral as first line, IV for rescue dosing only. He is agreeable. Will decrease frequency of IV.   I do think this is sufficient plan for remainder of stay, he would like to resume home meds at discharge: does not need Percocet Rx.   PMT will sign off, please reconsult should pain situation change.     PLAN:   1) Pain is consistent with atypical chest pain, abdomen pain, chronic back pain. Xray thoracic and lumbar spine with previous posterior fusion L4-L5 and no complication of hardware, no acute fracture.  Multilevel degenerative disc disease noted.  Patient is understanding of the plan. All questions and concerns addressed to patient's satisfaction.   2)Multimodal Medication Therapy  Topical: patient declines voltaren, lidocaine   NSAID'S: CrCl 34 mL/min. None  Steroids: Methylprednisolone 40 mg q8h  Muscle Relaxants: declines robaxin or other muscle relaxants   Adjuvants:  gabapentin 300  mg BID PRN (home med per pharmacy med [facility] patient just started taking again one week ago, he has not filled since 12/3/22 per )  Antidepressants/anxiolytics: None  Opioids: Hydromorphone 2-4 mg q4h prn, takes percocet at home 5-325 mg q4h prn   IV Pain medication: Hydromorphone 0.5 mg q3h prn : decrease to q6hprn, would recommend stopping or decreasing again tomorrow.   3)Non-medication interventions: he declines ice, heat   Acupuncture consult - offered and declined  Integrative consult - offered and declined  4)Constipation Prophylaxis: Scheduled Miralax daily and Senna/Docusate BID  5) Care Teams: Hospitalist, Pain Team, Urology,     -Opioid prescriber has been Prema Ibrahim David A, MD - Primary Care Provider   -[place]  pulled from system on 06/15/23. This indicates 06/07/23 Oxycodone-APAP 5-325 mg #180 (30 day)  Discharge Recommendations - We recommend prescribing the following at the time of discharge: likely home Percocet, filled 180 tabs on 6/7/23     Bushra Pizarro PHarmD  Acute Care Pain Management Program  [facility] ([place], [place], [place])  Monday-Friday 8a-4p   Page via online paging system or [email or web address]

## 2023-06-17 NOTE — PLAN OF CARE
Problem: Plan of Care - These are the overarching goals to be used throughout the patient stay.    Goal: Optimal Comfort and Wellbeing  Intervention: Monitor Pain and Promote Comfort  Problem: Pain Acute (Oncology Care)  Goal: Optimal Pain Control  Outcome: Progressing  Intervention: Develop Pain Management Plan  Intervention: Prevent or Manage Pain     Goal Outcome Evaluation:  Pt alert and oriented, report generalized body aches, managed with prn medication, declined integrative therapy, LS diminished, remained on 2L oxygen which is pt baseline, IVF infusing, primofit in used, pt report LBM Friday, stool softner medication given, activities encouraged, telemetry NSR, continue to monitor.

## 2023-06-17 NOTE — PROGRESS NOTES
[facility]    Medicine Progress Note - Hospitalist Service    Date of Admission:  6/12/2023    Assessment & Plan   83-year-old with history of chronic kidney disease, hypertension, peripheral neuropathy, previous ventral hernia repair with mesh, chronic respiratory failure on supplemental oxygen and COPD, who presents after a fall on 6/12/2023.  Patient complained of gross hematuria for several months.  Patient had known right renal mass with previous biopsy which indicated benign mass and incidental finding of pulmonary nodules.      Admitted for gross hematuria, generalized weakness, fall, progression and chronic anemia likely secondary to continued gross hematuria.  CT abdomen revealed ventral abdominal hernia.  CT guided biopsy of pulmonary nodule performed on 6/14/23.  Patient developed hypertensive urgency, increased shortness of breath and atypical chest pain on 6/14.  Elevated D-dimer on 6/15/23.  N M VQ scan low probability for PE.  Treated for possible acute heart failure with preserved ejection fraction with IV diuresis, transitioned to oral diuresis on 6/16/23.  Pathology is pending from biopsy. Oncology following.     Heart failure with preserved ejection fraction.   I/O -1320 mL, weight down from 113 kg to 107 kg.  Transthoracic echocardiogram LVEF hyperdynamic 65-70%, no WMA.  Decrease furosemide 20 mg po daily on 6/17/23.   magnesium and potassium replacement protocol.    Pulmonary nodules.   Suspicion for metastatic renal cell cancer.   Status post CT guided biopsy on 6/14/23.   Pathology is in process.  Oncology following.     Abdominal pain  Ventral abdominal hernia  History of hernia repair with mesh.   General surgery was consulted, no surgical intervention recommended at this time.   Lactic acid normal.  Continue tylenol, hydromorphone 2-4 mg q4h prn  hydromorphone 0.5 mg IV q3h prn for uncontrolled pain.  Outpatient follow up with hernia specialist in future.      Chronic respiratory failure with hypoxia  COPD  Elevated d-dimer  CT chest severe emphysema, bilateral pulmonary nodules.  BNP elevated 545, likely acute COPD exacerbation.   Continue home inhalers and supplemental oxygen at 2L/min  Continue prednisone 40 mg daily.  Lower extremity US negative for DVT.   NM VQ scan low probability for PE.     Acute kidney injury on Chronic kidney disease stage 3b.   Mild hyperkalemia  Creatinine baseline 2.0, max 2.42 on admission. Now 2.7.   Potassium 5.2->4.6->5.2.  Hold lisinopril.  Decrease furosemide   NO NSAIDS.    Gross hematuria  Chronic normocytic anemia  Anemia of chronic disease  Urinary tract infection  Hemoglobin 9.0 g/dL stable.   Iron studies iron sat low at 40, ferritin normal at 245, TIBC low 208 reticulocyte count, iron sat index normal at 19. Vitamin b12 level normal at 454.   Urine culture in process.   CBC daily.   Transfuse for hemoglobin < 7 g/dL.   See Urology follow up recommendation below.   Start Ceftriaxone 2 g IV q24h.     Hypertensive urgency - resolved.   Resistant hypertension  Home PTA medications: atenolol 25 mg daily,   Hold Lisinopril 20 mg daily for ELISABETH, hyperkalemia.   Continue Amlodipine 10 mg po daily.  Continue hydralazine 50 mg po TID  Fursemide as above.  hydralazine 10 mg IV q6h prn for SBP > 160 mmHg.      Chest pain atypical.   Atypical for cardiac pain, palpation reproduces pain.   Optimize pain management.  EKG, CXR, troponin I unremarkable.   Believe most of pain is associated with ventral hernia     GERD with possible esophagitis.   Order pantoprazole 40 mg po BID  Order sucralfate QID AC   Order GI cocktail.      Right Renal oncocytic tumor grade 2   CT abdomen on 6/12/23 with enlarging renal mass 8.3 cm.  Core biopsy on 9/29/22 of right renal mass.   Follow up with Dr. Erickson 7/2023 for further discussion for renal mass management.     Constipation  Bisacodyl suppository 10 mg rectal daily prn.   Miralax 17 g po BID.  Senna  "docusate 2 tabs BID  Order milk of mag daily prn.     Fall initial encounter  Degenerative disc disease lumbar spine.   Coccyx pain  CT head negative for acute pathology.  Xray thoracic and lumbar spine with previous posterior fusion L4-L5 and no complication of hardware, no acute fracture.  Multilevel degenerative disc disease noted.   PT and OT evaluation  Continue pain control as above.      Hyperlipidemia  Continue home statin.      Peripheral neuropathy  gabapentin 300 mg BID prn.      Sleep apnea  Non compliant with CPAP     Diet: 2 Gram Sodium Diet    DVT Prophylaxis: Pneumatic Compression Devices  Hill Catheter: Not present  Lines: None     Cardiac Monitoring: ACTIVE order. Indication: Acute decompensated heart failure (48 hours)  Code Status: Full Code      Clinically Significant Risk Factors        # Hyperkalemia: Highest K = 5.4 mmol/L in last 2 days, will monitor as appropriate          # Acute Kidney Injury, unspecified: based on a >150% or 0.3 mg/dL increase in last creatinine compared to past 90 day average, will monitor renal function         # Overweight: Estimated body mass index is 29.48 kg/m  as calculated from the following:    Height as of this encounter: 1.905 m (6' 3\").    Weight as of this encounter: 107 kg (235 lb 14.3 oz)., PRESENT ON ADMISSION          Disposition Plan      Expected Discharge Date: 06/18/2023      Destination: home with family  Discharge Comments: Rt lung bx  Anticiapte home with HC  Potential need for Palliative  needing liver biopsy with IR? r/t new lesions          Gucci Hall DO  Hospitalist Service  [facility]  Securely message with [employer] (more info)  Text page via [facility] Paging/Directory   ______________________________________________________________________    Interval History   Patient reports pain in chest after drinking water or soda, and upper abdominal discomfort that is worsened with deep inspiration.  He denies fever or " chills.  No nausea or vomiting.  Denies shortness of breath.     Physical Exam   Vital Signs: Temp: 98.5  F (36.9  C) Temp src: Oral BP: 136/65 Pulse: 65   Resp: 20 SpO2: 95 % O2 Device: Nasal cannula Oxygen Delivery: 2 LPM  Weight: 235 lbs 14.28 oz  GENERAL: The patient is not in any acute distressed. Awake and alert.  HEENT: Nonicteric sclerae, PERRLA, EOMI. Oropharynx clear. Moist mucous membranes. Conjunctivae appear well perfused.  HEART: Regular rate and rhythm without murmurs.  CHEST: tenderness to palpation over lower strernum.  LUNGS: Diminished breath sounds bilaterally. No wheezing.  ABDOMEN: Soft, positive bowel sounds, tender over epigastric area.  SKIN: No rash, no excessive bruising, petechiae, or purpura.  EXTREMITIES : no rashes, no swelling in legs.    NEUROLOGIC: conscious and oriented, follows commands, no obvious focal deficits.    Medical Decision Making     50 MINUTES SPENT BY ME on the date of service doing chart review, history, exam, documentation & further activities per the note.      Data     I have personally reviewed the following data over the past 24 hrs:    N/A  \   N/A   / N/A     140 100 50 (H) /  144 (H)   5.4 (H); 5.4 (H) 29 2.52 (H) \       Procal: N/A CRP: N/A Lactic Acid: 1.5         Imaging results reviewed over the past 24 hrs:   Recent Results (from the past 24 hour(s))   NM Lung Scan Ventilation and Perfusion    Narrative    EXAM: NM LUNG SCAN VENTILATION AND PERFUSION  LOCATION: [facility]  DATE: 6/16/2023    INDICATION: Shortness of breath.  COMPARISON: Thoracic CT dated 06/15/2023.  TECHNIQUE: 64.8 mCi Tc-99m DTPA inhaled. 8.8 mCi Tc-99m MAA, IV. Standard planar imaging during perfusion and ventilation portions of exam.    FINDINGS: Matched ventilation/perfusion defect in the right upper lobe and medial left lower lobe which correspond to dominant cystic change seen on prior CT dated 06/15/2023. No mismatched segmental perfusion defect to  suggest pulmonary embolism.      Impression    IMPRESSION:    No evidence of pulmonary embolism.   XR Chest 2 Views    Narrative    EXAM: XR CHEST 2 VIEWS  LOCATION: [facility]  DATE: 6/16/2023    INDICATION: Chest pain.  COMPARISON: 06/15/2023.      Impression    IMPRESSION: No significant change. Stable appearing right lung base opacity. Minimal pleural fluid. No pneumothorax. Upper normal heart size, without significant change.

## 2023-06-17 NOTE — PLAN OF CARE
Problem: Plan of Care - These are the overarching goals to be used throughout the patient stay.    Goal: Optimal Comfort and Wellbeing  Outcome: Progressing     Problem: Pain Acute (Oncology Care)  Goal: Optimal Pain Control  Outcome: Progressing   Goal Outcome Evaluation:    A&Ox4, VSS, and afebrile. Patient still constipated and was given PRN Milk of Mag today, if that does not help, can give PRN suppository. IV rocephin given today. Tele reading NSR. Male purewick in place, with hematuria. 2 L O2 via NC. K and Mg protocol, recheck tomorrow. Encourage ambulation OOB. 2g Na diet, assist of 1 with GB and walker, and alarms on for safety. Discharge pending once lung bx comes back and patient is medically stable.

## 2023-06-17 NOTE — SIGNIFICANT EVENT
Significant Event Note    Time of event: 7:30 PM June 16, 2023    Description of event:  - Patient requesting dilaudid , refusing percocet    Plan:  - DC'd percocet  - Reordered prior po dilaudid        Rajat Nieto MD

## 2023-06-17 NOTE — PLAN OF CARE
Pt is A&Ox4. Pt rated pain 5-6/10 during shift thus far. Managed with prn dilaudid.  A1 with walker for transferring. Saline locked. Voiding adequately with male purewick in place, urine dark brown/red. Education on medication administration, alarms, and use of call-light to reduce risk for falls and injury. No further issues noted. VSS other than elevated bp, one dose of prn hydralazine given and effective. On 2L O2 which is baseline. Tele NSR.

## 2023-06-17 NOTE — PROGRESS NOTES
Care Management Follow Up    Length of Stay (days): 4    Expected Discharge Date: 06/18/2023     Concerns to be Addressed:    Discharge needs.  Additional Information:  CM following along, assisting with final plan. Patient has accepting Home care for home PT and HHA. S/p lung bx, oncology following. ? Needing liver bx.  Friend will bring portable 02 at discharge and provide a ride.  CM avail for any further discharge needs, once medically ready to discharge.      Yuni Valencia RN

## 2023-06-18 NOTE — PLAN OF CARE
Pt is A&Ox4. Pt rated pain 5-7/10 during shift thus far. Managed with prn dilaudid and gabapentin.  A1 with walker for transferring. Saline locked. Voiding adequately with male purewick in place or urinal at bedside, urine dark brown/red. Education on medication administration, alarms, and use of call-light to reduce risk for falls and injury. No further issues noted. VSS. On 2L O2 which is baseline. Tele NSR.

## 2023-06-18 NOTE — PROGRESS NOTES
[facility]    Medicine Progress Note - Hospitalist Service    Date of Admission:  6/12/2023    Assessment & Plan   83-year-old with history of chronic kidney disease, hypertension, peripheral neuropathy, previous ventral hernia repair with mesh, chronic respiratory failure on supplemental oxygen and COPD, who presents after a fall on 6/12/2023.  Patient complained of gross hematuria for several months.  Patient had known right renal mass with previous biopsy which indicated benign mass and incidental finding of pulmonary nodules.      Admitted for gross hematuria, generalized weakness, fall, progression and chronic anemia likely secondary to continued gross hematuria.  CT abdomen revealed ventral abdominal hernia.  Patient developed shortness of breath and acute on chronic respiratory failure with hypoxia secondary to COPD and possible HF with pEF.  Improved with respiratory cares, steroids and IV diuresis.  CT guided biopsy of pulmonary nodule performed on 6/14/23.  Patient developed hypertensive urgency, increased shortness of breath and atypical chest pain on 6/14.  Elevated D-dimer on 6/15/23.  N M VQ scan low probability for PE.  Transitioned to oral diuresis on 6/16/23.  Pathology is pending from biopsy. Oncology following.   Patient declining TCU placement.    Pulmonary nodules.   Suspicion for metastatic renal cell cancer.   Status post CT guided biopsy on 6/14/23.   Pathology is in process.  Oncology following, likely outpatient follow up.     Chronic respiratory failure with hypoxia  COPD  Elevated d-dimer  CT chest severe emphysema, bilateral pulmonary nodules.  Lower extremity US negative for DVT.   NM VQ scan low probability for PE.   Continue home inhalers and supplemental oxygen at 2L/min  Continue prednisone 20 mg daily and taper off on 6/20/23.    Right Renal oncocytic tumor grade 2   CT abdomen on 6/12/23 with enlarging renal mass 8.3 cm.  Core biopsy on 9/29/22 of right  renal mass.   Follow up with Dr. Erickson 7/2023 for further discussion for renal mass management.     Acute kidney injury on Chronic kidney disease stage 3b.   Mild hyperkalemia  Creatinine baseline 2.0, max 2.42 on admission. Now 2.8.    Potassium 5.2->4.6->5.2->4.8.  Albuminuria on UA.   Hold lisinopril.  Hold fursomide.   Order urine protein creatinine ratio.  NO NSAIDS.    Gross hematuria  Chronic normocytic anemia  Anemia of chronic disease  Urinary tract infection  Hemoglobin 9.0 g/dL stable.   Iron studies iron sat low at 40, ferritin normal at 245, TIBC low 208 reticulocyte count, iron sat index normal at 19. Vitamin b12 level normal at 454.   Urine culture in process, NGTD.   CBC daily.   Transfuse for hemoglobin < 7 g/dL.   See Urology follow up recommendation below.   Continue Ceftriaxone 2 g IV q24h.     Heart failure with preserved ejection fraction.   113 kg to 107 kg on last weight on 6/16.  BNP elevated 545 on admission.  Transthoracic echocardiogram LVEF hyperdynamic 65-70%, no WMA.  Hold furosemide 20 mg po daily.  Discontinue magnesium and potassium replacement protocol.    Hypertensive urgency - resolved.   Resistant hypertension  Home PTA medications: atenolol 25 mg daily,   Hold Lisinopril 20 mg daily for ELISABETH, hyperkalemia.   Continue Amlodipine 10 mg po daily.  Continue hydralazine 50 mg po TID with hold parameters.      Abdominal pain  Ventral abdominal hernia  History of hernia repair with mesh.   General surgery was consulted, no surgical intervention recommended at this time.   Lactic acid normal.  Continue tylenol, hydromorphone 2-4 mg q4h prn  hydromorphone 0.5 mg IV q3h prn for uncontrolled pain.  Outpatient follow up with hernia specialist in future.     GERD with possible esophagitis.   pantoprazole 40 mg po BID  sucralfate QID AC HS     Constipation   Bisacodyl suppository 10 mg rectal daily prn.   Miralax 17 g po BID.  Senna docusate 2 tabs BID  Milk of mag daily prn.     Chest pain  "atypical.   Atypical for cardiac pain, palpation reproduces pain.   Optimize pain management.  EKG, CXR, troponin I unremarkable.   Believe most of pain is associated with ventral hernia   Fall initial encounter  Degenerative disc disease lumbar spine.   Coccyx pain  CT head negative for acute pathology.  Xray thoracic and lumbar spine with previous posterior fusion L4-L5 and no complication of hardware, no acute fracture.  Multilevel degenerative disc disease noted.   PT and OT evaluation  Continue pain control as above.   Hyperlipidemia  Continue home statin.   Peripheral neuropathy  gabapentin 300 mg BID prn.   Sleep apnea  Non compliant with CPAP       Diet: 2 Gram Sodium Diet    DVT Prophylaxis: Pneumatic Compression Devices  Hill Catheter: Not present  Lines: None     Cardiac Monitoring: ACTIVE order. Indication: Acute decompensated heart failure (48 hours)  Code Status: Full Code      Clinically Significant Risk Factors        # Hyperkalemia: Highest K = 5.4 mmol/L in last 2 days, will monitor as appropriate          # Acute Kidney Injury, unspecified: based on a >150% or 0.3 mg/dL increase in last creatinine compared to past 90 day average, will monitor renal function         # Overweight: Estimated body mass index is 29.48 kg/m  as calculated from the following:    Height as of this encounter: 1.905 m (6' 3\").    Weight as of this encounter: 107 kg (235 lb 14.3 oz).           Disposition Plan      Expected Discharge Date: 06/18/2023      Destination: home with family  Discharge Comments: Rt lung bx  Anticiapte home with HC  Potential need for Palliative  needing liver biopsy with IR? r/t new lesions          Gucci Hall DO  Hospitalist Service  [facility]  Securely message with Denise (more info)  Text page via [employer] Paging/Directory   ______________________________________________________________________    Interval History   Patient without bowel movement for many days.  " He reports improvement in abdominal pain with GI cocktail and sucralfate started on 6/17.  He denies fever, chills, nausea or vomiting.  Passing flatus.  Denies shortness of breath.  No dysuria.  Continues to have hematuria.     Physical Exam   Vital Signs: Temp: 98.3  F (36.8  C) Temp src: Oral BP: 115/66 Pulse: 61   Resp: 18 SpO2: 93 % O2 Device: Nasal cannula Oxygen Delivery: 2 LPM  Weight: 235 lbs 14.28 oz  GENERAL: The patient is not in any acute distressed. Awake.  HEENT: Nonicteric sclerae, PERRLA, EOMI. Oropharynx clear. Moist mucous membranes. Conjunctivae appear well perfused.  HEART: Regular rate and rhythm without murmurs.  CHEST: tenderness to palpation over lower strernum.  LUNGS: CTAB. No wheezing.  ABDOMEN: Soft, positive bowel sounds, tender over epigastric area.  SKIN: No rash, no excessive bruising, petechiae, or purpura.  EXTREMITIES : no rashes, no swelling in legs.    NEUROLOGIC: conscious and oriented, follows commands, no obvious focal deficits.  Medical Decision Making       50 MINUTES SPENT BY ME on the date of service doing chart review, history, exam, documentation & further activities per the note.      Data     I have personally reviewed the following data over the past 24 hrs:    9.4  \   9.5 (L)   / 162     142 98 66 (H) /  131 (H)   4.8 35 (H) 2.80 (H) \       Imaging results reviewed over the past 24 hrs:   No results found for this or any previous visit (from the past 24 hour(s)).

## 2023-06-18 NOTE — PLAN OF CARE
Problem: Risk for Delirium  Goal: Improved Behavioral Control  Intervention: Minimize Safety Risk  Recent Flowsheet Documentation  Taken 6/17/2023 2007 by Sumaya Coleman RN  Enhanced Safety Measures: room near unit station  Goal: Improved Attention and Thought Clarity  Outcome: Progressing  Goal: Improved Sleep  Outcome: Progressing   Goal Outcome Evaluation:       Took pt on from 1900. Pt A&ox4 and VSS expect hypertension given scheduled BP medications. Pt c/o pain 8/10 and PRN dilaudid given and pain went down to 6/10. Tele NRS. Call light within reach.

## 2023-06-18 NOTE — PLAN OF CARE
Problem: Plan of Care - These are the overarching goals to be used throughout the patient stay.    Goal: Optimal Comfort and Wellbeing  Outcome: Progressing     Problem: Pain Acute (Oncology Care)  Goal: Optimal Pain Control  Outcome: Progressing   Goal Outcome Evaluation:    A&Ox4, VSS (last BP was 95/54), patient tired today and has been sleeping a lot, reports he did not sleep well last night, and has a poor appetite. No BM still, ordered PRN suppository and pt would like it this evening when his visitor leaves. Reports low back and abdominal pain, tender to touch on abdomen, hypoactive bowel sounds present. Pt now on NS at 75mL/hr. 2L via NC. Hematuria still present, patient can be incontinent so can have male purewick if he chooses but encourage ambulation OOB. 2g Na diet, assist of 1 with GB and walker, urinal at bedside, and alarms on for safety. Discharge pending once medically ready.       Referring Physician (Optional): Wang Brooks MD

## 2023-06-18 NOTE — CONSULTS
RENAL CONSULT NOTE    REQUESTING PHYSICIAN: Gucci Hall DO    REASON FOR CONSULT:ELISABETH on CKD, hematuria, renal mass.    ASSESSMENT/PLAN:    PLAN:   -Avoid nephrotoxins, renal dose medication, daily wt, strict I/O, avoid hypotension.   -decrease amlodipine to 5 mg daily, to allow for more liberalized BPs (BP in mid 90's with exam today)  -Start gentle IVFs, NS 75/hr  -HOLD furosemide, and lisinopril  -UPCR and serologies, pending  -Urology and Oncology consulted for RCC/RRenal mass Bx proven 8/2022 and + hematuria, pt refusing R nephrectomy. Per Urology, No urgent urologic intervention indicated. Pt has a follow up appointment with Oncology 6/20/2023 to discuss further Tx options for R renal mass/Metastatic RCC.  -Renal will schedule a post hospitalization Nephrology follow up appointment closer to discharge.    -BMP daily      Non-Oliguric ELISABETH: Cr base is ~2.0 and briana to 2.8 today. ELISABETH etiology Likely multifactorial 2/2 noted R renal mass, +/- pre-renal hemodynamic ATN (wt loss since admission, softer BPs last few days, poor PO intake, on furosemide), +/-Known renal mass (S/P Bx 8/2023, follows with ONC, urology PTA), +/- on ACE therapy, +/- age related changes/underlying and CKD 3 at baseline. + proteinuria (UPCR, pending), + gross hematuria (reported for weeks PTA by pt), UA + RBCs.  proteinuria. UPCR, spep, upep, pending. Renal imaging 6/12/23: stable L adrenal gland thickening, R Kidney mass~8.3 cm (s/P Bx 9/29/22) suspicious or RCC, no Hydro. Cr trend 2.5>2.7>2.8. See plan above.     CKD 3b: baseline Cr ~2.0 since 2020 etiology likely 2/2 hypertensive nephrosclerosis, age related changes, R renal mass/RCC and diabetic nephropathy can not be ruled out (A1C ~7.0 since 2014). Renal imaging with no hydro/+R renal mass. + proteinuria on UA since 2020. UPCR, and serologies, pending. Check further serologies if indicated when less acute and if indicated.     Anemia: hg 9, iron studies low. , b12/foalte  WNL.  UC NGTD. + gross hematuria. Transfuse for Hg <7.    HTN: on atenolol 25, amlodipine 10, hydralazine 50mg TID with hold parameters. ACE and lasix on hold with ELISABETH.     Volume: wti downtrending 113>107 since 6/16. VS stable. furosemide held.     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 Monitor with ELISABETH    Acid/base: elevated C02, monitor. likely 2/2 respiratory process/DEhydration, monitor    Right Renal Oncocytic tumor, Grade 2: Abdominal CT 6/12/23 + R Renal Mass 8.3 cm. Bx 9/29/22 of R Renal Mass. Has F/U appt. With Dr. Erickson 7/2023 for further discussion on Right Renal Mass.    Gross hematuria: likely 2/2 R renal mass. Monitor PVRs/bladder scans. No concerns for clot retention per urology. Urine remains blood tinged. No urgent urological procedures indicated per Urology note. Maintain follow up appt with Dr. Erickson 7/20/23 to further discuss options for renal mass management    Pulmonary nodules: suspicion for metastatic RCC. S/P CT guided Bx 6/14/23. ONC following    Chronic Respiratory failure with hypoxia/COPD: elevated D-dimer. + chest CT/severe emphysema/BL pulmonary Nodules. Lower Extremity US Neg for DVT. NM VQ low probability for PE. Has inhalers. On prednisone taper.     HLD: on statin    CPAP: non-compliant with CPAP    DM: A1C ~7.0 since 2014 . Last A1C 6.6 12/5/23. Management per [facility].    Peripheral Neuropathy: on Gabapentin 300 BID prn.     HFpEF: . Echo 65-70%, no WMA. Furosemide on hold    HPI: Soto is an 84 y/o M with a PMH of HTN, HLD, CKD3b, Neuropathy, previous ventral hernia s/p mesh, chronic rep failure on supplement 02 with COPD, who present for fall/gross hematuria for several months 6/12/23. Pt has known Right renal mass with previous Bx which indicates benign mass and incidental finding og pulmonary nodules. Renal consulted for ELISABETH on CKD status.     Pts daughter at bedside, vickie other daughter from [place] present via phone.   C/O chronic pain,  has PRN dilaudid and sachi which is helping.   Wt loss 113>107 since last wt on 6/16  Reports dry mouth  Poor po intake  Softer BPs mid 90s at time of visit  Noted RCC, and now new pulm nodules (S/P Bx, results pending).  PerON note with metastatic disease highly likely making R renal nephrectomy a moot point  Urology consulted, pt and family refusing nephrectomy at this point.   Pt has ONC f/u appt 6/20/2023 to discuss further options for RCC/new pulm nodules. Per chart review Oncology is leaning toward palliative treatments/conservative cares 2/2 declining performance status, and multiple co-morbidities he would likely not tolerate aggressive therapies.   Pt unaware if he has ever seen a nephrologies  Denies n, v, diarrhea, or CP  +AVILA, +constipation, +oncgoing hematuria  Chronic pain, PRN dilaudid   Denies NSAIDS  Discussed plan,labs and answered all questions  Discussed  Plan with floor RN    REVIEW OF SYSTEMS:  Complete 12 point review of systems was negative other than those noted in the HPI      No past medical history on file.    No current facility-administered medications on file prior to encounter.  albuterol (PROVENTIL HFA;VENTOLIN HFA) 90 mcg/actuation inhaler, [ALBUTEROL (PROVENTIL HFA;VENTOLIN HFA) 90 MCG/ACTUATION INHALER] Inhale 2 puffs every 6 (six) hours as needed for wheezing or shortness of breath.  albuterol (PROVENTIL) 2.5 mg /3 mL (0.083 %) nebulizer solution, [ALBUTEROL (PROVENTIL) 2.5 MG /3 ML (0.083 %) NEBULIZER SOLUTION] Take 3 mL (2.5 mg total) by nebulization every 4 (four) hours as needed for shortness of breath.  atenolol (TENORMIN) 25 MG tablet, [ATENOLOL (TENORMIN) 25 MG TABLET] Take 1 tablet (25 mg total) by mouth every morning.  ferrous sulfate (FEROSUL) 325 (65 Fe) MG tablet, Take 325 mg by mouth daily (with breakfast)  gabapentin (NEURONTIN) 300 MG capsule, Take 300 mg by mouth 2 times daily as needed for neuropathic pain  hydrochlorothiazide (HYDRODIURIL) 25 MG tablet, Take 25  mg by mouth daily  lisinopril (PRINIVIL,ZESTRIL) 20 MG tablet, [LISINOPRIL (PRINIVIL,ZESTRIL) 20 MG TABLET] Take 20 mg by mouth every morning.  oxyCODONE-acetaminophen (PERCOCET) 5-325 mg per tablet, [OXYCODONE-ACETAMINOPHEN (PERCOCET) 5-325 MG PER TABLET] Take 1 tablet by mouth every 4 (four) hours as needed for pain.  OXYGEN-AIR DELIVERY SYSTEMS MISC, [OXYGEN-AIR DELIVERY SYSTEMS MISC] Inhale 2 L/min continuous. Indications: copd, sleep apnea  simvastatin (ZOCOR) 20 MG tablet, Take 20 mg by mouth every evening  vitamin C with B complex (B COMPLEX-C) tablet, Take 1 tablet by mouth daily        No current outpatient medications on file.      ALLERGIES/SENSITIVITIES:  Allergies   Allergen Reactions     Morphine (Pf) [Morphine] Other (See Comments) and Dizziness     Hallucinations     Social History     Tobacco Use     Smoking status: Former     Types: Cigarettes     Quit date: 1985     Years since quittin.3     Smokeless tobacco: Never   Substance Use Topics     Alcohol use: No     Drug use: No             PHYSICAL EXAM:  Physical Exam   Temp: 97.6  F (36.4  C) Temp src: Oral BP: 114/56 Pulse: 66   Resp: 18 SpO2: 94 % O2 Device: Nasal cannula Oxygen Delivery: 2 LPM  Vitals:    06/15/23 0600 23 0642 23 0600   Weight: 112.6 kg (248 lb 3.8 oz) 106.1 kg (233 lb 14.5 oz) 107 kg (235 lb 14.3 oz)     Vital Signs with Ranges  Temp:  [97.6  F (36.4  C)-98.5  F (36.9  C)] 97.6  F (36.4  C)  Pulse:  [61-83] 66  Resp:  [18] 18  BP: (114-147)/(55-72) 114/56  SpO2:  [93 %-97 %] 94 %  I/O last 3 completed shifts:  In: 240 [P.O.:240]  Out: 480 [Urine:480]    Patient Vitals for the past 72 hrs:   Weight   23 0600 107 kg (235 lb 14.3 oz)   23 0642 106.1 kg (233 lb 14.5 oz)     GEN: NAD  CV: RRR   Lung: diminished bases, on 2L NC  Ab: soft and NT;, obese  Ext: no edema   Skin: no rash  :+purewick, urine dark brown/Red    Laboratory:     Recent Labs   Lab 23  1010 23  1041 06/15/23  0053  06/14/23  0550 06/13/23  0605 06/12/23  1419   WBC 9.4 10.6 7.4 7.4 6.3 7.0   RBC 3.31* 3.37* 3.09* 3.12* 3.05* 3.02*   HGB 9.5* 9.7* 9.0* 9.0* 8.7* 8.6*   HCT 32.2* 32.5* 29.2* 29.6* 29.7* 30.3*    199 182 171 162 178       Basic Metabolic Panel:  Recent Labs   Lab 06/18/23  1010 06/17/23  1041 06/16/23  0902 06/15/23  0807 06/15/23  0053 06/14/23  0550 06/13/23  0605    143 140  --  144 142 143   POTASSIUM 4.8 4.9 5.4*  5.4*  --  5.0 4.6 5.2*   CHLORIDE 98 97* 100  --  101 102 102   CO2 35* 37* 29  --  32* 31 33*   BUN 66* 58* 50*  --  41* 39* 42*   CR 2.80* 2.76* 2.52*  --  2.21* 1.94* 2.14*   * 151* 144* 89 134* 113 115   LEENA 9.5 10.1 9.6  --  10.0 9.7 9.0       INRNo lab results found in last 7 days.    Recent Labs   Lab Test 06/18/23  1010 06/17/23  1041   POTASSIUM 4.8 4.9   CHLORIDE 98 97*   BUN 66* 58*      Recent Labs   Lab Test 06/16/23  1236 06/15/23  0053 06/13/23  0605 06/12/23  1534   ALBUMIN  --  3.8 3.5  --    BILITOTAL  --  0.5 0.5  --    ALT  --  12 11  --    AST  --  23 19  --    PROTEIN 70*  --   --  100*       Personally reviewed today's laboratory studies      Thank you for involving us in the care of this patient. We will continue to follow along with you.      Yesika Villarreal [facility]  [facility]  [phone]

## 2023-06-19 NOTE — PROGRESS NOTES
At the request of primary RN Mackenzie ORTIZ, writer called Nephrology team at [phone] and spoke with Isabel.  Writer informed Nephrology of the elevated potassium level of 5.7. Noted Isabel stated would update Nephrology team ASAP and will call the Unit for further instructions.  -Domenica SIEGEL RN

## 2023-06-19 NOTE — PLAN OF CARE
Pt is A&Ox4. Pt rated pain 4-6/10 during shift thus far. Managed with prn dilaudid.  A1 with walker for transferring. IVF infusing. Voiding adequately, urine dark brown/red. Had one episode of incontinence. Now has male purewick in place. Education on medication administration, alarms, and use of call-light to reduce risk for falls and injury. No further issues noted. VSS. On 2L O2 which is baseline.

## 2023-06-19 NOTE — PLAN OF CARE
Goal Outcome Evaluation:      Plan of Care Reviewed With: patient    Overall Patient Progress: no change     Pt consuming <50% of estimated needs. Ordered ONS to improve energy/protein intake.

## 2023-06-19 NOTE — PROGRESS NOTES
[facility]    Medicine Progress Note - Hospitalist Service    Addendum: 6/19/23.  1530  Contacted by Mackenzie Wong to evaluate patient for altered mental status and sedation.  Soto Gibbs vital signs were stable on my evaluation.  He had drooling from mouth.  He would open his eyes to touch and voice and then fall back asleep.  He become more alert after dosage of Narcan 0.2 mg was administered.  Neurologic exam was non-focal.  EOMI intact, PERRL, General sensation intact.  4+/5 muscle strength equal and symmetric bilaterally.  Negative Babinski reflex.      A/P  Oversedation secondary to opioid analgesia.   Acute on chronic respiratory failure with hypoxia and hypercapnia  Respiratory acidosis, acute.   Will discontinue all opioid analgesia.   Avoid sedating medications. Discontinue lorazepam, and Percocet.   Decrease gabapentin to 100 mg po BID  Order heat and ice therapy, topical lidocaine ointment and diclofenac topical as needed for joint and back pain.   Order scheduled Tylenol 975 mg po TID.   Opioid reversal protocol, q15 min vital signs and RASS assessment.   BIPAP 12/5 ordered. RCAT to follow.   Transfer to intermediate care bed.   Repeat VBG q3h.     acute kidney injury.   Continue nephrology recommendations    Gucci Hall DO, MS  [facility] Service  Internal Medicine    Date of Admission:  6/12/2023    Assessment & Plan   83-year-old with history of chronic kidney disease, hypertension, peripheral neuropathy, previous ventral hernia repair with mesh, chronic respiratory failure on supplemental oxygen and COPD, who presents after a fall on 6/12/2023.  Patient complained of gross hematuria for several months.  Patient had known right renal mass with previous biopsy which indicated benign mass and incidental finding of pulmonary nodules.      Admitted for gross hematuria, generalized weakness, fall, progression and chronic anemia likely secondary to continued gross  hematuria.  CT abdomen revealed ventral abdominal hernia.  Patient developed shortness of breath and acute on chronic respiratory failure with hypoxia secondary to COPD and possible HF with pEF.  Improved with respiratory cares, steroids and IV diuresis.  CT guided biopsy of pulmonary nodule performed on 6/14/23.  Patient developed hypertensive urgency, increased shortness of breath and atypical chest pain on 6/14.  Elevated D-dimer on 6/15/23.  N M VQ scan low probability for PE.  Transitioned to oral diuresis on 6/16/23.  Pathology is pending from biopsy. Oncology following.   Patient declining TCU placement.  Adjusted opioid analgesia on 6/19/23 for increased somnolence.  Will monitor and plan for discharge to home with home cares on 6/20/23 with follow up with [facility] urology and [facility] oncology for suspected renal cancer, with lung metastasis.  Multiple pain complaints.  Palliative care consultation for goals of care discussion.    Pulmonary nodules.   Suspicion for metastatic renal cell cancer.   Status post CT guided biopsy on 6/14/23.   Pathology is in process.  Oncology following, outpatient follow up in [facility] clinic.     Chronic respiratory failure with hypoxia  COPD  Elevated d-dimer  CT chest severe emphysema, bilateral pulmonary nodules.  Lower extremity US negative for DVT.   NM VQ scan low probability for PE.   Continue home inhalers and supplemental oxygen at 2L/min  Completed prednisone taper.     Right Renal oncocytic tumor grade 2   CT abdomen on 6/12/23 with enlarging renal mass 8.3 cm.  Core biopsy on 9/29/22 of right renal mass.   Follow up with Dr. Erickson 7/2023 for further discussion for renal mass management.     Acute kidney injury on Chronic kidney disease stage 3b.   Mild hyperkalemia  Creatinine baseline 2.0, max 2.42 on admission. Now 2.8.    Potassium 5.2->4.6->5.2->4.8.  Urine protein creatinine ratio still pending.   Albuminuria on UA.   Hold lisinopril.  Hold  fursomide.   NO NSAIDS.  Appreciate nephrology recommendations.     Gross hematuria  Chronic normocytic anemia  Anemia of chronic disease  Hemoglobin 9.0 g/dL stable.   Iron studies iron sat low at 40, ferritin normal at 245, TIBC low 208 reticulocyte count, iron sat index normal at 19. Vitamin b12 level normal at 454.   CBC daily.   Transfuse for hemoglobin < 7 g/dL.   See Urology follow up recommendation below.     Abnormal UA  No urinary tract infection.  Discontinue ceftriaxone as culture grew mixture of urogenital bhavya < 10,000.    Heart failure with preserved ejection fraction.   113 kg to 107 kg on last weight on 6/16.  BNP elevated 545 on admission.  Transthoracic echocardiogram LVEF hyperdynamic 65-70%, no WMA.  Hold furosemide 20 mg po daily.    Hypertensive urgency - resolved.   Resistant hypertension  Home PTA medications: atenolol 25 mg daily,   Hold Lisinopril 20 mg daily for ELISABETH, hyperkalemia.   Continue Amlodipine 10 mg po daily.  Continue hydralazine 50 mg po TID with hold parameters.   Fair control.     Abdominal pain  Ventral abdominal hernia  History of hernia repair with mesh.   General surgery was consulted, no surgical intervention recommended at this time.   Lactic acid normal.  Discontinue oral/IV hydromorphone  Percocet 5/325mg 1 to 2 tabs q4h prn.  Outpatient follow up with hernia specialist in future.     GERD with possible esophagitis.   pantoprazole 40 mg po BID  sucralfate QID AC HS     Constipation   Bisacodyl suppository 10 mg rectal daily prn.   Miralax 17 g po BID.  Senna docusate 2 tabs BID  Milk of mag daily prn.     Chest pain atypical.   Atypical for cardiac pain, palpation reproduces pain.   Optimize pain management.  EKG, CXR, troponin I unremarkable.   Believe most of pain is associated with ventral hernia   Fall initial encounter  Degenerative disc disease lumbar spine.   Coccyx pain  CT head negative for acute pathology.  Xray thoracic and lumbar spine with previous  "posterior fusion L4-L5 and no complication of hardware, no acute fracture.  Multilevel degenerative disc disease noted.   PT and OT evaluation  Continue pain control as above.   Hyperlipidemia  Continue home statin.   Peripheral neuropathy  gabapentin 300 mg BID prn.   Sleep apnea  Non compliant with CPAP     Diet: 2 Gram Sodium Diet    DVT Prophylaxis: Pneumatic Compression Devices  Hill Catheter: Not present  Lines: None     Cardiac Monitoring: None  Code Status: Full Code      Clinically Significant Risk Factors                 # Acute Kidney Injury, unspecified: based on a >150% or 0.3 mg/dL increase in last creatinine compared to past 90 day average, will monitor renal function         # Overweight: Estimated body mass index is 29.82 kg/m  as calculated from the following:    Height as of this encounter: 1.905 m (6' 3\").    Weight as of this encounter: 108.2 kg (238 lb 8.6 oz).           Disposition Plan      Expected Discharge Date: 06/20/2023      Destination: home with family  Discharge Comments: Rt lung bx  Anticiapte home with HC  Potential need for Palliative  needing liver biopsy with IR? r/t new lesions  neph consult          Gucci Hall DO  Hospitalist Service  [facility]  Securely message with [employer] (more info)  Text page via [employer] Paging/Directory   ______________________________________________________________________    Interval History   Patient took hydromorphone 1 mg po this morning.  He is very somnolent.  He opens his eyes and responds to questions and goes back to sleep.  He denies chest pain.  Complains of chronic back pain.    Physical Exam   Vital Signs: Temp: 98.4  F (36.9  C) Temp src: Oral BP: 135/73 Pulse: 73   Resp: 18 SpO2: 95 % O2 Device: Nasal cannula Oxygen Delivery: 2 LPM  Weight: 238 lbs 8.6 oz    GENERAL: Somnolent but arousable.    HEENT: Nonicteric sclerae, PERRLA, EOMI. Oropharynx clear. Moist mucous membranes. Conjunctivae appear well " perfused.  HEART: Regular rate and rhythm without murmurs.  CHEST: tenderness to palpation over lower strernum.  LUNGS: CTAB. No wheezing.  ABDOMEN: Soft, positive bowel sounds, tender over epigastric area.  SKIN: No rash, no excessive bruising, petechiae, or purpura.  EXTREMITIES : no rashes, no swelling in legs.    NEUROLOGIC: Somnolent, follows commands, one word responses to questions, No focal muscle strength or sensory deficits.  Negative Babinski.     Medical Decision Making     50 MINUTES SPENT BY ME on the date of service doing chart review, history, exam, documentation & further activities per the note.      Data       Imaging results reviewed over the past 24 hrs:   No results found for this or any previous visit (from the past 24 hour(s)).

## 2023-06-19 NOTE — CONSULTS
Palliative Care Consultation Note  [facility]      Patient: Soto Gibbs  Date of Admission:  6/12/2023    Requesting Clinician / Team: Dr. Hall  Reason for consult: Symptom management  Goals of care     Recommendations & Counseling     GOALS OF CARE:     Life-prolonging without limits      Per chart review, patient has declined transitional care for strengthening.  Desires to discharge home with home care.    Recommend follow-up with outpatient palliative care for ongoing goals care discussion. Discussed with daughters today. Will need to discuss goals further with patient when her is alert enough to participate.    ADVANCE CARE PLANNING:    No health care directive on file. Per  informed consent policy next of kin should be involved if patient becomes unable.    There is no POLST form on file, plan to complete prior to DC.    Code status: Full Code    MEDICAL MANAGEMENT:   #Pain, kidney cancer related pain- right flank pain    Acetaminophen (Tylenol), PRN    Anti-convulsants:  Gabapentin (Neurontin) -decrease dosing until patient more alert.    Heat and Physical Therapy     Avoid opiates at this time patient oversedated and somnolent.  Has received Narcan.    Patient previously on and weaned off steroids.  Did not seem to substantially assist with minimizing pain or discomfort.  Steroids can help with capsular stretch and inflammation.     #Delirium  #Agitation    Avoid benzodiazepines, antihistamines, anticholinergics if able.    Lights on and blinds open during the day.  Reorient frequently.    Lights & TV off during the night.  Promote normal circadian rhythm.    Limit sensory deprivation - utilize hearing aids, glasses, etc.    Frequently assess basic needs such as temperature, elimination, thirst/hunger, pain    Consider bedside attendant (if able) for additional safety    Other Narcan protocol for opiate-induced sedation     #General Weakness/Debility     Physical  "Therapy    Occupational therapy    Recommend TCU at discharge.  Patient declining at desires to go home.  Concern for home safety.    PSYCHOSOCIAL/SPIRITUAL:    Family     Palliative Care will continue to follow along. Thank you for the consult and allowing us to aid in the care of Soto Gibbs.    These recommendations have been discussed with Dr. Hall.    JAMES Garcia, APRN, CNS, AOCNS  Securely message with Method (more info)  Text page via [facility] Paging/Directory       Palliative Summary/HPI     Soto Gibbs is a 83 year old male with a past medical history of FORTUNATO, COPD on chronic O2, CKD 3, HTN, neuropathy, HLD, who presented on 6/12/2023 after sustaining a fall, abdominal pain, ELISABETH, enlarging right renal mass initially diagnosed in 2022.  Per urology's note patient had an enlarging right renal mass initially 4.9 cm on 3/2/2022 and has continued to increase in size.  8/25 2022 patient underwent right renal mass biopsy at [facility] pathology revealed renal oncocytic tumor, histologic grade 2.  10/2022 Patient followed up with Dr. Myers in the outpatient clinic. Per daughter Vangie, surgical intervention was not offered. She wishes the medical record be clear that patient did not \"declined surgery\" it was not offered.  Patient has subsequently been following with Dr. Bains since 5/23/2023.  With disease progression and likely pulmonary mets from renal cell carcinoma (still awaiting final pathology results), treatment would be more palliative in nature per oncology.  6/12/2023 CT scan showed 8.3 x 8.2 x 6.0 cm mass in mid to upper right kidney and 2.4 cm right mid renal cyst  6/14/2023 CT-guided right lower lobe biopsy.  Pathology pending.  6/15/2023 chest CT shows emphysema, multiple bilateral pulmonary nodules.  6/16/23 VQ scan negative for PE.      Palliative Care Summary:   Met with patient then spoke with 2 daughters, Vangie and Flakita via telephone.   I introduced our role as an extra " "layer of support and how we help patients and families dealing with serious, potentially life-limiting illnesses. I explained the composition of the palliative care team.  Palliative care helps patients and families navigate their care while focusing on the whole person; providing emotional, social and spiritual support  Palliative care often assists with symptom management, information sharing about what to expect from the illness, available treatment options and what effect those options may have on the disease course, and provide effective communication and caring support.    Prognosis, Goals, & Planning:      Functional Status just prior to this current hospitalization:    ECOG2 (Ambulatory and capable of all selfcare but unable to carry out any work activities; may need help with IADLs up and about > 50% of waking hours)    Interim history/status while hospitalized: Patient has had ongoing issues with altered kidney function and pain control.  Maintain nephrology consulted.  Today patient became increasingly somnolent and was found to be less responsive and drooling upon entering the room.  Patient has been on Narcan.  Slowly waking up.     Spoke with 2 daughters via telephone today.  They expressed many feelings of being overwhelmed with no knowledge that cancer a year to a year and a half ago is now progressed to the lungs.  All are awaiting biopsy results from right lower lobe lung biopsy.  Daughter, Vangie, was present for the October 2022 discussion with Dr. Myers with urology whether surgery was an option or not.  She is very clear that surgery was not offered to him.  She expresses frustration with multiple competing opinions in patient's chart about him refusing a declining surgery when \"it was never offered.\"  Support provided.  Reviewed that the role of palliative care can assist with: To gather information from multiple specialist to help support the family and patient and discussing goals of care " and plan.  Discussed differences between TCU and home with home care physical therapy.  Reviewed with him that patient declined TCU and is wishing to discharge home.  Reviewed with him briefly that after assessment of patient today he needs to be medically stable prior to discharge and that should be reevaluated.    Goal #1: Obtain biopsy results.    Goal #2: Once biopsy obtained, encourage discussion about neck steps for treatment.    Goal #3: Determine discharge disposition.    Goal #4: potentially discuss patient's desires, hopes and wishes. Discuss code status.        Code Status was NOT addressed today as patient is not alert enough to participate in discussion.  Oversedated from opiates versus acidosis.      Patient's decision making preferences: independently          Patient has decision-making capacity today for complex decisions:Unreliable            Coping, Meaning, & Spirituality:     Mood, coping, and/or meaning in the context of serious illness were addressed today: Yes    Social:   Living situation:lives alone  Important relationships/caregivers:2 daughters Flakita and Vangie     Past Medical History:  No past medical history on file.     Past Surgical History:  Past Surgical History:   Procedure Laterality Date     ABDOMEN SURGERY      Hernia     COLON SURGERY      Polyps.         Family History:  No family history on file.     Medications:  I have reviewed this patient's medication profile and medications from this hospitalization.  Notable medications contributing to sedation include Percocet, Dilaudid and gabapentin.  Discussed with  Order for discontinuing these medications and being down to gabapentin 100 mg twice daily.  Also discussed discontinuing as needed lorazepam.    ROS:  Comprehensive ROS is reviewed and is negative except as here & per HPI:   Pain: Complains of upper back especially right flank pain.  Rates it as a/10  Dyspnea: None  Anxiety: None  Nausea: None  Weakness/Fatigue:  Severe  Constipation: None.  BM this AM.    PHYSICAL EXAM:  Temp:  [97.9  F (36.6  C)-98.4  F (36.9  C)] 98.4  F (36.9  C)  Pulse:  [67-73] 73  Resp:  [18-20] 18  BP: (113-135)/(51-73) 114/55  SpO2:  [94 %-95 %] 95 %  Wt Readings from Last 3 Encounters:   06/19/23 108.2 kg (238 lb 8.6 oz)   05/18/23 108.4 kg (239 lb)   09/08/22 112.4 kg (247 lb 12.8 oz)      General appearance: delirious, fatigued, no distress and slowed mentation  Head: Normocephalic, without obvious abnormality, atraumatic  Eyes: Clear anicteric.  Pupils round  Nose: no discharge  Throat: Moist oral mucosa.  Lungs: clear to auscultation bilaterally  Heart: Regular rate rhythm  Abdomen: Soft, nontender +BS  Extremities: Warm, no cyanosis.  Trace ankle edema  Pulses: 2+ and symmetric  Neurologic: Somnolent. Drooling      Lab Results: personally reviewed.     Lab Results   Component Value Date    WBC 9.4 06/19/2023    HGB 9.6 06/19/2023    HCT 33.8 06/19/2023     06/19/2023     06/19/2023     AST   Date Value Ref Range Status   06/15/2023 23 0 - 40 U/L Final     ALT   Date Value Ref Range Status   06/15/2023 12 0 - 45 U/L Final     Alkaline Phosphatase   Date Value Ref Range Status   06/15/2023 64 45 - 120 U/L Final     Albumin   Date Value Ref Range Status   06/15/2023 3.8 3.5 - 5.0 g/dL Final       RADIOLOGY:  XR Chest 2 Views    Result Date: 6/16/2023  EXAM: XR CHEST 2 VIEWS LOCATION: [facility] DATE: 6/16/2023 INDICATION: Chest pain. COMPARISON: 06/15/2023.     IMPRESSION: No significant change. Stable appearing right lung base opacity. Minimal pleural fluid. No pneumothorax. Upper normal heart size, without significant change.     NM Lung Scan Ventilation and Perfusion    Result Date: 6/16/2023  EXAM: NM LUNG SCAN VENTILATION AND PERFUSION LOCATION: [facility] DATE: 6/16/2023 INDICATION: Shortness of breath. COMPARISON: Thoracic CT dated 06/15/2023. TECHNIQUE: 64.8 mCi Tc-99m DTPA  inhaled. 8.8 mCi Tc-99m MAA, IV. Standard planar imaging during perfusion and ventilation portions of exam. FINDINGS: Matched ventilation/perfusion defect in the right upper lobe and medial left lower lobe which correspond to dominant cystic change seen on prior CT dated 06/15/2023. No mismatched segmental perfusion defect to suggest pulmonary embolism.     IMPRESSION: No evidence of pulmonary embolism.    US Lower Extremity Venous Duplex Bilateral    Result Date: 6/15/2023  EXAM: US LOWER EXTREMITY VENOUS DUPLEX BILATERAL LOCATION: [facility] DATE: 6/15/2023 INDICATION: Shortness of breath and edema. COMPARISON: None. TECHNIQUE: Venous Duplex ultrasound of bilateral lower extremities with and without compression, augmentation and duplex. Color flow and spectral Doppler with waveform analysis performed. FINDINGS: Exam includes the common femoral, femoral, popliteal veins as well as segmentally visualized deep calf veins and greater saphenous vein. Linear and curved transducers used due to body habitus. RIGHT: No deep vein thrombosis. No superficial thrombophlebitis. No popliteal cyst. LEFT: No deep vein thrombosis. No superficial thrombophlebitis. No popliteal cyst.     IMPRESSION: 1.  No deep venous thrombosis in either leg.    Echocardiogram Complete    Result Date: 6/15/2023  [id number]^RENE^[place] [address]  Name: BERHANE HECTOR MRN: [id number] : 1940 Study Date: 06/15/2023 08:11 AM Age: 83 yrs Gender: Male Patient Location: [facility] Reason For Study: SOB Ordering Physician: SARAH LÓPEZ Performed By: ADAN  BSA: 2.4 m2 Height: 75 in Weight: 248 lb ______________________________________________________________________________ Procedure Complete Portable Echo Adult. ______________________________________________________________________________ Interpretation Summary  The left ventricle is normal in size.  The visual ejection fraction is 65-70%. No regional wall motion abnormalities noted. Diastolic Doppler findings (E/E' ratio and/or other parameters) suggest left ventricular filling pressures are increased. noncoronary and L cusps partially fused IVC diameter >2.1 cm collapsing <50% with sniff suggests a high RA pressure estimated at 15 mmHg or greater. There is no pericardial effusion. Sinus rhythm was noted. The study was technically difficult. For better visualization consider cardiac MRI. ______________________________________________________________________________ Left Ventricle The left ventricle is normal in size. Diastolic Doppler findings (E/E' ratio and/or other parameters) suggest left ventricular filling pressures are increased. The visual ejection fraction is 65-70%. No regional wall motion abnormalities noted.  Right Ventricle Normal right ventricle size and systolic function. TAPSE is abnormal, which is consistent with abnormal right ventricular systolic function. The right ventricular systolic function is normal.  Atria The left atrium is not well visualized. Normal left atrial size. Right atrium not well visualized.  Mitral Valve The mitral valve leaflets appear thickened, but open well. There is mild (1+) mitral regurgitation. There is no mitral valve stenosis.  Tricuspid Valve The tricuspid valve is not well visualized. There is mild (1+) tricuspid regurgitation. There is no tricuspid stenosis.  Aortic Valve noncoronary and L cusps partially fused. No aortic regurgitation is present. No aortic stenosis is present.  Pulmonic Valve The pulmonic valve is not well visualized. There is no pulmonic valvular regurgitation. There is no pulmonic valvular stenosis.  Vessels IVC diameter >2.1 cm collapsing <50% with sniff suggests a high RA pressure estimated at 15 mmHg or greater.  Pericardium There is no pericardial effusion.  Rhythm Sinus rhythm was noted.  ______________________________________________________________________________ MMode/2D Measurements & Calculations LA Volume Indexed (AL/bp): 25.9 ml/m2 RV Base: 3.2 cm TAPSE: 1.6 cm  Time Measurements MM HR: 71.0 BPM  Doppler Measurements & Calculations MV E max derek: 114.0 cm/sec MV A max derek: 120.8 cm/sec MV E/A: 0.94 MV dec slope: 441.6 cm/sec2 MV dec time: 0.26 sec LV V1 max P.6 mmHg LV V1 max: 118.4 cm/sec LV V1 VTI: 34.8 cm PA acc time: 0.06 sec E/E' av.3 Lateral E/e': 19.4 Medial E/e': 15.2 RV S Derek: 12.1 cm/sec  ______________________________________________________________________________ Report approved by: Ced Altamirano 06/15/2023 10:14 AM       CT Chest w/o Contrast    Result Date: 6/15/2023  EXAM: CT CHEST W/O CONTRAST LOCATION: [facility] DATE: 6/15/2023 INDICATION: SOB s p lung  biopsy  COMPARISON: CT 2023 TECHNIQUE: CT chest without IV contrast. Multiplanar reformats were obtained. Dose reduction techniques were used. CONTRAST: None. FINDINGS: LUNGS AND PLEURA: Similar severe bullous emphysematous changes. Multiple bilateral pulmonary nodules reidentified. Postbiopsy changes of the right lung, without evidence of pneumothorax. MEDIASTINUM/AXILLAE: Bilateral gynecomastia. Diffuse chest wall edema. Stable benign 1.9 cm cyst adjacent to the right first costochondral joint, perhaps a synovial cyst. No thoracic adenopathy. Normal heart size and no pericardial effusion. Dilated central pulmonary arteries suggesting pulmonary artery hypertension. Stable dilated esophagus which could reflect achalasia. CORONARY ARTERY CALCIFICATION: Mild. UPPER ABDOMEN: No significant finding. MUSCULOSKELETAL: Multilevel degenerative changes of the thoracic spine. No acute osseous lesion or suspicious bony abnormality.     IMPRESSION: 1.  Similar severe bullous emphysematous changes. Multiple bilateral pulmonary nodules reidentified. Postbiopsy changes of the right lung,  without evidence of pneumothorax. 2.  No other acute process in the chest.     XR Chest Port 1 View    Result Date: 6/15/2023  EXAM: XR CHEST PORT 1 VIEW LOCATION: [facility] DATE: 6/15/2023 INDICATION: SOB recent lung biopy COMPARISON: Portable chest radiograph 06/14/2023     IMPRESSION: Heart size and vascularity are normal. Shallow inspiration. Postbiopsy changes are present within the right lung base with stable opacity of the right lower lobe. No pneumothorax. Possible trace effusions. No acute osseous abnormality.    XR Chest 1 View    Result Date: 6/14/2023  EXAM: XR CHEST 1 VIEW LOCATION: [facility] DATE: 6/14/2023 INDICATION: Status post right lung biopsy COMPARISON: CT biopsy 06/14/2023     IMPRESSION: Heart size and vascularity are normal. Shallow inspiration. Postbiopsy changes are present within the right lung base with slight increased opacity medial right lower lobe. No pneumothorax nor gross pleural effusion.    CT Lung Mediastinum Biopsy    Result Date: 6/14/2023  EXAM: 1. PERCUTANEOUS BIOPSY RIGHT LOWER LOBE NODULE 2. CT GUIDANCE 3. CONSCIOUS SEDATION LOCATION: [facility] DATE: 6/14/2023 INDICATION: renal cancer. lung nodules TECHNIQUE: Dose reduction techniques were used. PROCEDURE: Informed consent obtained. Site marked. Prior images reviewed. Required items made available. Patient identity confirmed verbally and with arm band. Patient reevaluated immediately before administering sedation. Universal protocol was followed. Time out performed. The site was prepped and draped in sterile fashion. 5 mL of 1% lidocaine was infused into the local soft tissues. Using standard technique and under direct CT guidance, a 20-gauge biopsy device was used to obtain three core biopsies. Tissue was submitted to Pathology and was adequate by preliminary review by a pathologist. The patient tolerated the procedure well. No immediate  complications. SEDATION: Versed 1  mg. Fentanyl 50 mcg. The procedure was performed with administration intravenous conscious sedation with appropriate preoperative, intraoperative, and postoperative evaluation. 30  minutes of supervised face to face conscious sedation time was provided by a radiology nurse under my direct supervision.     IMPRESSION: 1.  Successful CT-guided biopsy of the right lower lobe nodule with moderate sedation . Reference CPT Codes: 30812, 58946, 25511, 88119    XR Thoracic Spine 2 Views    Result Date: 6/12/2023  EXAM: XR THORACIC SPINE 2 VIEWS, XR LUMBAR SPINE 2/3 VIEWS LOCATION: [facility] DATE: 6/12/2023 INDICATION: acute low backache COMPARISON: None.     IMPRESSION: Thoracic spine: Mild thoracic kyphosis. The vertebral bodies of the thoracic spine otherwise have normal stature and alignment without evidence of compression fracture. Anterior marginal osteophytes within the mid and lower thoracic spine. Multilevel degenerative disc disease of the lumbar spine with disc height loss, most pronounced within the mid and lower thoracic spine. The partially imaged lungs are unremarkable. Soft tissues unremarkable. Lumbar spine: 5 lumbar-type vertebral bodies. Postsurgical changes posterior fusion at L4/L5. No hardware complication. Anterolisthesis of L4 and L5 measuring 5 mm. Multilevel degenerative disc disease of the lumbar lumbar spine with disc height loss, most pronounced at L4/L5 and L5/S1. Anterior marginal osteophytes at L4/L5 and L5/S1. Atherosclerotic vascular disease. Soft tissues otherwise unremarkable.     XR Lumbar Spine 2/3 Views    Result Date: 6/12/2023  EXAM: XR THORACIC SPINE 2 VIEWS, XR LUMBAR SPINE 2/3 VIEWS LOCATION: [facility] DATE: 6/12/2023 INDICATION: acute low backache COMPARISON: None.     IMPRESSION: Thoracic spine: Mild thoracic kyphosis. The vertebral bodies of the thoracic spine otherwise have normal stature  and alignment without evidence of compression fracture. Anterior marginal osteophytes within the mid and lower thoracic spine. Multilevel degenerative disc disease of the lumbar spine with disc height loss, most pronounced within the mid and lower thoracic spine. The partially imaged lungs are unremarkable. Soft tissues unremarkable. Lumbar spine: 5 lumbar-type vertebral bodies. Postsurgical changes posterior fusion at L4/L5. No hardware complication. Anterolisthesis of L4 and L5 measuring 5 mm. Multilevel degenerative disc disease of the lumbar lumbar spine with disc height loss, most pronounced at L4/L5 and L5/S1. Anterior marginal osteophytes at L4/L5 and L5/S1. Atherosclerotic vascular disease. Soft tissues otherwise unremarkable.     CT Chest Abdomen Pelvis w/o Contrast    Result Date: 6/12/2023  EXAM: CT CHEST ABDOMEN PELVIS W/O CONTRAST LOCATION: [facility] DATE/TIME: 6/12/2023 2:59 PM CDT INDICATION: abd pain, palpable hernia midline ventral, some chronic back pain known renal mass with hematuria, Oncology has also ordered this study for 2 days from now COMPARISON: CT chest 09/04/2019 TECHNIQUE: CT scan of the chest, abdomen, and pelvis was performed without IV contrast. Multiplanar reformats were obtained. Dose reduction techniques were used. CONTRAST: None. FINDINGS: LUNGS AND PLEURA: The central airways are clear. Mild bronchial wall thickening. Similar severe bullous emphysematous changes. Multiple new bilateral multilobar pulmonary nodules. This includes a newly visualized 10 x 12 mm solid right lower lobe nodule image 202 series 4. Stable 6 mm right lower lobe nodule measuring 12. New 7 mm left lower lobe nodule image 114. New 12 mm nodule image 196. Multiple additional nodules. No pleural effusion. MEDIASTINUM/AXILLAE: Bilateral gynecomastia. Diffuse chest wall edema. Stable benign 1.9 cm cyst adjacent to the right first costochondral joint, perhaps a synovial cyst. No  thoracic adenopathy. Normal heart size and no pericardial effusion. Dilated central pulmonary arteries suggesting pulmonary artery hypertension. Stable dilated esophagus which could reflect achalasia. CORONARY ARTERY CALCIFICATION: Mild. HEPATOBILIARY: Normal. PANCREAS: Normal. SPLEEN: Normal. ADRENAL GLANDS: Stable left adrenal gland thickening. KIDNEYS/BLADDER: Limited evaluation due to the lack of contrast. Masslike enlargement of the mid to upper right kidney with effacement of the hilum. This masslike area measures 8.3 x 8.2 x 6.0 cm. 2.4 cm right mid renal cyst. No follow-up is indicated. No hydronephrosis or hydroureter. BOWEL: Normal caliber small bowel and colon. Apparent anastomotic suture line within a colonic segment in the mid abdomen. Post ventral abdominal wall hernia repair with diastases recti and anterior bulging of intra-abdominal contents below the mesh material. Associated 1.0 x 3.2 cm fluid collection below the mesh material, likely a chronic seroma. Small fat-containing hernia above the mesh material. Mild surrounding fat stranding is likely chronic. Tiny right inguinal hernia containing fat and trace fluid. No free air. LYMPH NODES: No thoracic adenopathy. VASCULATURE: Mild to moderate aortoiliac atherosclerosis. PELVIC ORGANS: Moderate prostatomegaly. Presacral edema. No pelvic free fluid. MUSCULOSKELETAL: Spinal and pelvic degenerative changes. L4-L5 laminectomy and fusion. No definite suspicious osseous lesion.     IMPRESSION: 1.  Masslike enlargement of the right mid to upper kidney highly suspicious for a primary renal cell carcinoma. Note that evaluation of the kidneys is limited due to the lack of contrast. 2.  Multiple bilateral pulmonary nodules likely representing pulmonary metastases. 3.  Spinal and pelvic degenerative changes. No definite suspicious osseous lesion. 4.   Post mid ventral abdominal hernia repair with a small fat-containing hernia above the mesh material. A small  presumed chronic seroma is noted below the mesh material. Diastases recti and anterior bulging of the mesh material are also noted.    Head CT w/o contrast    Result Date: 6/12/2023  EXAM: CT HEAD W/O CONTRAST LOCATION: [facility] DATE/TIME: 6/12/2023 2:56 PM CDT INDICATION: fall COMPARISON: 11/02/2022 TECHNIQUE: Routine CT Head without IV contrast. Multiplanar reformats. Dose reduction techniques were used. FINDINGS: INTRACRANIAL CONTENTS: No intracranial hemorrhage, extraaxial collection, or mass effect.  No CT evidence of acute infarct. Mild presumed chronic small vessel ischemic changes. Mild generalized volume loss. No hydrocephalus. VISUALIZED ORBITS/SINUSES/MASTOIDS: Prior bilateral cataract surgery. Visualized portions of the orbits are otherwise unremarkable. No paranasal sinus mucosal disease. No middle ear or mastoid effusion. BONES/SOFT TISSUES: No acute abnormality.     IMPRESSION: 1.  No CT evidence for acute intracranial process. 2.  Brain atrophy and presumed chronic microvascular ischemic changes as above.      Data reviewed:  Results for orders placed or performed during the hospital encounter of 06/12/23 (from the past 24 hour(s))   Protein electrophoresis    Narrative    The following orders were created for panel order Protein electrophoresis.  Procedure                               Abnormality         Status                     ---------                               -----------         ------                     Total Protein, Serum for...[[id number]]  Abnormal            Final result               Protein Electrophoresis,...[[id number]]  Abnormal            Final result                 Please view results for these tests on the individual orders.   Total Protein, Serum for ELP   Result Value Ref Range    Total Protein Serum for ELP 6.2 (L) 6.4 - 8.3 g/dL   Protein Electrophoresis, Serum   Result Value Ref Range    Albumin 3.5 (L) 3.7 - 5.1 g/dL    Alpha 1 0.3 0.2 -  0.4 g/dL    Alpha 2 0.7 0.5 - 0.9 g/dL    Beta Globulin 0.7 0.6 - 1.0 g/dL    Gamma Globulin 1.0 0.7 - 1.6 g/dL    Monoclonal Peak 0.0 <=0.0 g/dL    ELP Interpretation       Hypoalbuminemia with an otherwise essentially normal electrophoretic pattern. No obvious monoclonal protein seen. Pathologic significance requires clinical correlation. Jane Paulino MD   Protein  random urine   Result Value Ref Range    Total Protein Urine mg/dL 111.4 (H) 1.0 - 14.0 mg/dL    Total Protein UR MG/MG CR 0.89 mg/mg Cr    Creatinine Urine mg/dL 125 mg/dL    Narrative    The reference range has not been established for total protein, creatinine and the protein mg/mg creatinine  in random urine samples. The result should be integrated into the clinical context for interpretation.     The reference range has not been established for creatinine and the protein mg/mg creatinine in random urine samples. The result should be integrated into the clinical context for interpretation.     Basic metabolic panel   Result Value Ref Range    Sodium 140 136 - 145 mmol/L    Potassium 5.7 (H) 3.5 - 5.0 mmol/L    Chloride 98 98 - 107 mmol/L    Carbon Dioxide (CO2) 32 (H) 22 - 31 mmol/L    Anion Gap 10 5 - 18 mmol/L    Urea Nitrogen 69 (H) 8 - 28 mg/dL    Creatinine 3.19 (H) 0.70 - 1.30 mg/dL    Calcium 9.3 8.5 - 10.5 mg/dL    Glucose 152 (H) 70 - 125 mg/dL    GFR Estimate 19 (L) >60 mL/min/1.73m2   CBC with platelets   Result Value Ref Range    WBC Count 9.4 4.0 - 11.0 10e3/uL    RBC Count 3.36 (L) 4.40 - 5.90 10e6/uL    Hemoglobin 9.6 (L) 13.3 - 17.7 g/dL    Hematocrit 33.8 (L) 40.0 - 53.0 %     (H) 78 - 100 fL    MCH 28.6 26.5 - 33.0 pg    MCHC 28.4 (L) 31.5 - 36.5 g/dL    RDW 15.3 (H) 10.0 - 15.0 %    Platelet Count 181 150 - 450 10e3/uL        80 MINUTES SPENT BY ME on the date of service doing chart review, history, exam, documentation & further activities per the note.

## 2023-06-19 NOTE — PROGRESS NOTES
"CLINICAL NUTRITION SERVICES - ASSESSMENT NOTE     Nutrition Prescription    RECOMMENDATIONS FOR MDs/PROVIDERS TO ORDER:  None at this time    Malnutrition Status:    Unable to determine due to pt asleep during visit, unable to complete NFPE or ask pt about po intake    Recommendations already ordered by Registered Dietitian (RD):  Medical food supplement therapy - Nepro with Carbsteady BID at 10am and 2pm     Future/Additional Recommendations:  Monitor po intake/tolerance/adequacy, wts, labs, BMs, ONS tolerance     REASON FOR ASSESSMENT  Soto Gibbs is a/an 83 year old male assessed by the dietitian for Provider Order - Malnutrition Risk    NUTRITION HISTORY  Dx: Fall at home, ELISABETH on CKD, hematuria, renal mass  PMH: chronic kidney disease, hypertension, peripheral neuropathy, previous ventral hernia repair with mesh, chronic respiratory failure on supplemental oxygen and COPD    Pt was asleep all 3 times RD checked in on him, and could not be awakened with voice.    CURRENT NUTRITION ORDERS  Diet: Renal diet  Intake/Tolerance: Pt ordering 2-3 meals/day, and eating 50-75% of ordered meals, meeting   <50% of estimated needs since admit.     LABS  CO2: 32 (H)  BUN: 69 (H)  Cr: 3.19 (H)  Glucose: 152 (H)    MEDICATIONS  Senokot  Miralax    GI:  Last BM on 6/18    ANTHROPOMETRICS  Height: 190.5 cm (6' 3\")  Most Recent Weight: 108.4 kg (238 lb 8.6 oz)    IBW: 84.5 kg  BMI: Overweight BMI 25-29.9  Weight History:   06/19/23 108.2 kg (238 lb 8.6 oz)  06/17/23  107 kg (235 lb 14.3 oz)  06/16/23  106.1 kg (233 lb 14.5 oz)  06/15/23 112.6 kg (248 lb 3.8 oz)  06/14/23          114.7 kg (252 lb 13.9 oz)  06/13/23          111.9 kg (246 lb 9.6 oz)  06/19/23 108.2 kg (238 lb 8.6 oz)  05/18/23 108.4 kg (239 lb)  09/08/22 112.4 kg (247 lb 12.8 oz)  05/10/22 125.9 kg (277 lb 8 oz)  16% wt loss in 1 year    Dosing Weight: 90.4 kg adjusted body wt based on driest admit wt on 6/19    ASSESSED NUTRITION NEEDS  Estimated Energy Needs: " Subjective:     Interval History: No acute events overnight. Reports improvement in SOB with 1 unit PRBCs and pulse steroids. Continues to complain of dry cough unchanged from yesterday. No other complaints today.     Continuous Infusions:  Scheduled Meds:   amLODIPine  10 mg Oral Daily    ARIPiprazole  2 mg Oral Daily    calcium-vitamin D3  1 tablet Oral BID WM    dapsone  100 mg Oral Daily    diphenhydrAMINE  50 mg Intravenous QHS    enoxaparin  40 mg Subcutaneous Daily    fexofenadine  180 mg Oral Daily    fluticasone  2 spray Each Nare Daily    gabapentin  1,100 mg Oral TID    levalbuterol  0.63 mg Nebulization Q8H WA    lipase-protease-amylase  4 capsule Oral TID WM    lisinopril  10 mg Oral Daily    magnesium oxide  400 mg Oral BID    meropenem (MERREM) IVPB  2 g Intravenous Q8H    methylPREDNISolone (SOLU-Medrol) IVPB (doses > 250 mg)   Intravenous Daily    montelukast  10 mg Oral Daily    morphine  15 mg Oral TID    multivit-min-FA-coenzyme Q10 100-5 mcg-mg  1 tablet Oral BID    mycophenolate  500 mg Oral BID    predniSONE  5 mg Oral Daily    tacrolimus  0.5 mg Oral BID    tacrolimus  3 mg Oral BID    topiramate  50 mg Oral BID    venlafaxine  75 mg Oral QHS     PRN Meds:sodium chloride, acetaminophen, butalbital-acetaminophen-caffeine -40 mg, dextrose 50%, dextrose 50%, diphenhydrAMINE, glucagon (human recombinant), glucose, glucose, guaiFENesin, heparin, porcine (PF), insulin aspart U-100, ipratropium, LORazepam, ondansetron, oxyCODONE, polyethylene glycol, potassium chloride 10% **AND** potassium chloride 10% **AND** potassium chloride 10%, promethazine, tiZANidine    Review of patient's allergies indicates:   Allergen Reactions    Albuterol Palpitations    Colistin Anaphylaxis    Vancomycin analogues      Infusion reaction that does not resolve with slowing    Neupogen [filgrastim] Other (See Comments)     Ostealgia after five daily doses of 300 mcg.      Bactrim  4266-8461 kcals/day (25 - 30 kcals/kg)  Justification: Maintenance  Estimated Protein Needs:  grams protein/day (1 - 1.2 grams of pro/kg)  Justification: Maintenance  Estimated Fluid Needs: 8159-1413 mL/day (1 mL/kcal)   Justification: Maintenance    MALNUTRITION  % Intake: </= 50% for >/= 5 days (severe)  % Weight Loss: Weight loss does not meet criteria  Subcutaneous Fat Loss: unable to assess, pt asleep  Muscle Loss: unable to assess, pt asleep  Fluid Accumulation/Edema: Does not meet criteria  Malnutrition Diagnosis: Unable to determine due to pt asleep during visit, unable to complete NFPE or ask pt about po intake    NUTRITION DIAGNOSIS  Inadequate protein-energy intake related to poor appetite as evidenced by pt consuming <50% of estimated needs    INTERVENTIONS  Implementation  Medical food supplement therapy - Nepro with Carbsteady BID    Goals  Patient to consume % of nutritionally adequate meal trays TID, or the equivalent with supplements/snacks.     Monitoring/Evaluation  Progress toward goals will be monitored and evaluated per protocol.     [sulfamethoxazole-trimethoprim] Hives    Ceftazidime Hives     Pt stated can tolerate cefapine not ceftazidime    Ceftazidime     Dronabinol Other (See Comments)     Mental changes/hallucinations    Haldol [haloperidol lactate] Other (See Comments)     Seizure like activity    Nsaids (non-steroidal anti-inflammatory drug)      Cannot have due to lung transplant    Adhesive Rash     Cloth tape- please use tegaderm or paper tape    Aztreonam Rash    Ciprofloxacin Nausea And Vomiting     Projectile N/V, per patient.  Unwilling to retry therapy.       Review of Systems   Constitutional: Positive for fatigue. Negative for appetite change, chills, diaphoresis and fever.   HENT: Positive for congestion (chronic, stable). Negative for postnasal drip, sore throat and trouble swallowing.    Respiratory: Positive for cough and shortness of breath. Negative for chest tightness, wheezing and stridor.    Cardiovascular: Negative for chest pain, palpitations and leg swelling.   Gastrointestinal: Negative for abdominal distention, abdominal pain, constipation, diarrhea, nausea and vomiting.   Endocrine: Negative for polydipsia, polyphagia and polyuria.   Genitourinary: Negative for dysuria, frequency, hematuria and urgency.   Musculoskeletal: Positive for myalgias. Negative for back pain, neck pain and neck stiffness.   Skin: Negative for color change, pallor and rash.   Allergic/Immunologic: Positive for immunocompromised state.   Neurological: Negative for dizziness, weakness, light-headedness and headaches (chronic, well controlled on current regimen).   Psychiatric/Behavioral: Negative for agitation, behavioral problems and confusion. The patient is not nervous/anxious.      Objective:   Physical Exam   Constitutional: She is oriented to person, place, and time. She appears well-developed and well-nourished. No distress.   HENT:   Head: Normocephalic and atraumatic.   Right Ear: External ear normal.   Left Ear: External  ear normal.   Nose: Nose normal.   Mouth/Throat: Oropharynx is clear and moist. No oropharyngeal exudate.   Eyes: Conjunctivae and EOM are normal.   Neck: Normal range of motion. Neck supple. No JVD present.   Cardiovascular: Normal rate, regular rhythm, normal heart sounds and intact distal pulses. Exam reveals no gallop and no friction rub.   No murmur heard.  Pulmonary/Chest: Effort normal. She has decreased breath sounds in the right middle field, the right lower field, the left middle field and the left lower field. She has no wheezes. She has no rhonchi. She has no rales.   Abdominal: Soft. Bowel sounds are normal. She exhibits no distension. There is no tenderness. There is no guarding.   Musculoskeletal: Normal range of motion. She exhibits no edema.   Lymphadenopathy:     She has no cervical adenopathy.   Neurological: She is alert and oriented to person, place, and time. No cranial nerve deficit.   Skin: Skin is warm and dry. Capillary refill takes less than 2 seconds. No rash noted. She is not diaphoretic. No erythema. There is pallor.   Psychiatric: Her speech is normal and behavior is normal. Thought content normal. Her mood appears not anxious. She does not exhibit a depressed mood.   Nursing note and vitals reviewed.        Vital Signs (Most Recent):  Temp: 98.3 °F (36.8 °C) (10/03/18 1204)  Pulse: 83 (10/03/18 1204)  Resp: 18 (10/03/18 1204)  BP: 122/75 (10/03/18 1204)  SpO2: (!) 92 % (10/03/18 1204) Vital Signs (24h Range):  Temp:  [98 °F (36.7 °C)-98.6 °F (37 °C)] 98.3 °F (36.8 °C)  Pulse:  [] 83  Resp:  [13-18] 18  SpO2:  [92 %-94 %] 92 %  BP: (111-138)/(64-90) 122/75     Weight: 58.7 kg (129 lb 6.6 oz)  Body mass index is 24.45 kg/m².      Intake/Output Summary (Last 24 hours) at 10/3/2018 1454  Last data filed at 10/3/2018 0400  Gross per 24 hour   Intake --   Output 2650 ml   Net -2650 ml       Significant Labs:  CBC:  Recent Labs   Lab  10/03/18   0600   WBC  6.22   RBC  3.58*   HGB   9.0*   HCT  31.0*   PLT  123*   MCV  87   MCH  25.1*   MCHC  29.0*     BMP:  Recent Labs   Lab  10/03/18   0600   NA  139   K  4.8   CL  109   CO2  22*   BUN  15   CREATININE  1.3   CALCIUM  8.8      Tacrolimus Levels:  Recent Labs   Lab  10/03/18   0600   TACROLIMUS  6.6     Microbiology:  Microbiology Results (last 7 days)     Procedure Component Value Units Date/Time    Fungus culture [[id number]] Collected:  10/01/18 1319    Order Status:  Completed Specimen:  Respiratory from [facility] Updated:  10/03/18 1413     Fungus (Mycology) Culture --     YEAST   Few  Identification pending      Narrative:       Bronchial Wash    Fungus culture [[id number]] Collected:  10/01/18 1319    Order Status:  Completed Specimen:  Respiratory from [facility] Updated:  10/03/18 1409     Fungus (Mycology) Culture --     YEAST   Many  Identification pending      Narrative:       Bronchial Wash    Culture, Respiratory [[id number]] Collected:  10/01/18 1319    Order Status:  Completed Specimen:  Respiratory from [facility] Updated:  10/03/18 0748     Respiratory Culture Normal respiratory anibal     Gram Stain (Respiratory) Rare WBC's     Gram Stain (Respiratory) No organisms seen    Narrative:       Bronchial Wash    Culture, Respiratory [[id number]] Collected:  10/01/18 1319    Order Status:  Completed Specimen:  Respiratory from [facility] Updated:  10/03/18 0741     Respiratory Culture Normal respiratory anibal     Gram Stain (Respiratory) Rare WBC's     Gram Stain (Respiratory) No organisms seen    Narrative:       Bronchial Wash    AFB Culture & Smear [[id number]] Collected:  10/01/18 1319    Order Status:  Completed Specimen:  Respiratory from [facility] Updated:  10/02/18 2127     AFB Culture & Smear Culture in progress     AFB CULTURE STAIN No acid fast bacilli seen.    Narrative:       Bronchial Wash    AFB Culture & Smear [[id number]] Collected:  10/01/18 1319    Order Status:  Completed Specimen:  Respiratory from [facility] Updated:   10/02/18 2127     AFB Culture & Smear Culture in progress     AFB CULTURE STAIN No acid fast bacilli seen.    Narrative:       Bronchial Wash    Respiratory Viral Panel by PCR [facility]; Nasal Swab [[id number]]     Order Status:  No result Specimen:  Respiratory     Culture, Respiratory with Gram Stain [[id number]]     Order Status:  No result Specimen:  Respiratory from Sputum, Expectorated           Microbiology Results (last 7 days)     Procedure Component Value Units Date/Time    Fungus culture [[id number]] Collected:  10/01/18 1319    Order Status:  Completed Specimen:  Respiratory from [facility] Updated:  10/03/18 1413     Fungus (Mycology) Culture --     YEAST   Few  Identification pending      Narrative:       Bronchial Wash    Fungus culture [[id number]] Collected:  10/01/18 1319    Order Status:  Completed Specimen:  Respiratory from [facility] Updated:  10/03/18 1409     Fungus (Mycology) Culture --     YEAST   Many  Identification pending      Narrative:       Bronchial Wash    Culture, Respiratory [[id number]] Collected:  10/01/18 1319    Order Status:  Completed Specimen:  Respiratory from [facility] Updated:  10/03/18 0748     Respiratory Culture Normal respiratory anibal     Gram Stain (Respiratory) Rare WBC's     Gram Stain (Respiratory) No organisms seen    Narrative:       Bronchial Wash    Culture, Respiratory [[id number]] Collected:  10/01/18 1319    Order Status:  Completed Specimen:  Respiratory from [facility] Updated:  10/03/18 0741     Respiratory Culture Normal respiratory anibal     Gram Stain (Respiratory) Rare WBC's     Gram Stain (Respiratory) No organisms seen    Narrative:       Bronchial Wash    AFB Culture & Smear [[id number]] Collected:  10/01/18 1319    Order Status:  Completed Specimen:  Respiratory from [facility] Updated:  10/02/18 2127     AFB Culture & Smear Culture in progress     AFB CULTURE STAIN No acid fast bacilli seen.    Narrative:       Bronchial Wash    AFB Culture & Smear [[id number]]  Collected:  10/01/18 1319    Order Status:  Completed Specimen:  Respiratory from [facility], [facility] Updated:  10/02/18 2127     AFB Culture & Smear Culture in progress     AFB CULTURE STAIN No acid fast bacilli seen.    Narrative:       Bronchial Wash    Respiratory Viral Panel by PCR [facility]; Nasal Swab [[id number]]     Order Status:  No result Specimen:  Respiratory     Culture, Respiratory with Gram Stain [[id number]]     Order Status:  No result Specimen:  Respiratory from Sputum, Expectorated         I have reviewed all pertinent labs within the past 24 hours.

## 2023-06-19 NOTE — PLAN OF CARE
Occupational Therapy Discharge Summary    Reason for therapy discharge:    Patient/family request discontinuation of services. Pt with repeated and consecutive refusals to participate in occupational therapy since admission.    Progress towards therapy goal(s). See goals on Care Plan in [facility] electronic health record for goal details.  Goals not met.  Barriers to achieving goals:   limited tolerance for therapy.    Therapy recommendation(s):    Continued therapy is recommended.  Rationale/Recommendations:  not at baseline for BADLs. If pt expresses he'd be agreeable to occupational therapy services, please reorder.

## 2023-06-19 NOTE — PROGRESS NOTES
"Pt exhibiting slightly increased somnolence and sedation from morning despite no sedative meds being given since night shift. MD notified and rounded at bedside. Narcan given with some improvement of symptoms but pt returned to somnolent state. Second round of narcan given to only slight improvement of symptoms. Pt was able to state he now \"felt everything\" but was still very tired. VBG obtained and critical labs resulted. Continuous bipap started. Pt was transferred to [facility] and report given to nurse. All belongings in room such as phone, clothes, chargers, transferred with patient.   "

## 2023-06-19 NOTE — PROGRESS NOTES
RN administered suppository. Patient was able to have large BM. RN administered IV dilaudid for low back pain.

## 2023-06-19 NOTE — PROGRESS NOTES
Called to place pt on BIPAP  Pt placed on BIPAP, settings as follows:  18/8, 24, 30%.  Pt beatrice well.  Pt transported to  without incident and placed back on BIPAP, above settings.  Will continue to monitor pt

## 2023-06-20 NOTE — PROGRESS NOTES
"SPIRITUAL HEALTH SERVICES Progress Note      Saw pt Soto Gibbs per LOS.    Patient/Family Understanding of Illness and Goals of Care - Soto showed signs of confusion and disorientation    Distress and Loss - Soto showed signs of frustration and annoyance     Strengths, Coping, and Resources - Dtr at bedside    Meaning, Beliefs, and Spirituality - Soto made statements wishing to have a \"Latter-day \" \"pray over him\" and also made statements want \"all milan\" to pray over him\".  As writer inquired and offered prayer and help finding a Latter-day  he quickly became guarded and declined anything and stated he had no Adventism needs, and declined visiting with      Plan of Care - [facility] available per request       Frankie Aldridge M.Div., [facility]  Staff    [phone]    "

## 2023-06-20 NOTE — PROGRESS NOTES
Unsuccessful orthostatic BP while standing. Patient having tremors/muscle jerking while attempting BP. Heavy assist of 3.

## 2023-06-20 NOTE — PROGRESS NOTES
[facility]    Hospitalist Progress Note    Date of Service (when I saw the patient): 06/20/2023    Assessment & Plan      Date of Admission:  6/12/2023    Assessment & Plan  Soto Gibbs is a pleasant 83-year-old gentleman with chronic kidney disease, hypertension, peripheral neuropathy, previous ventral hernia repair with mesh, chronic respiratory failure on supplemental oxygen and COPD, who presented after a fall on 6/12/2023.  He reported gross hematuria for several months.  Has known right renal mass with previous biopsy which indicated benign mass and incidental finding of pulmonary nodules.    Admitted for gross hematuria, generalized weakness, fall, progression and chronic anemia likely secondary to continued gross hematuria. Patient developed shortness of breath and acute on chronic respiratory failure with hypoxia secondary to COPD and possible HF with pEF. Improved with respiratory cares, steroids and IV diuresis.  CT guided biopsy of pulmonary nodule performed on 6/14/23.  Developed hypertensive urgency, increased shortness of breath and atypical chest pain on 6/14.  Elevated D-dimer on 6/15/23.  Nuc. Med. VQ scan -> low probability for PE.  Transitioned to oral diuresis on 6/16/23.      Pathology is pending from biopsy. Oncology following.  Soto has been declining TCU placement.  Adjusted opioid analgesia on 6/19/23 for increased somnolence (6/19).  Will need follow up with [facility] and [facility] for suspected renal cancer, with lung metastasis.   Multiple pain complaints.  Current problems include:    Oversedation secondary to opioid analgesia on 6/19.  Acute on chronic respiratory failure with hypoxia and hypercapnia  Respiratory acidosis, acute.  - discontinued all opioid analgesia.   Avoid sedating medications: discontinued lorazepam, and Percocet.   Decreased gabapentin to 100 mg po BID  Order heat and ice therapy, topical lidocaine ointment and  diclofenac topical as needed for joint and back pain.   Order scheduled Tylenol 975 mg po TID.   Opioid reversal protocol, q15 min vital signs and RASS assessment.   BIPAP 12/5 ordered. RCAT to follow.   Transferred to intermediate care bed.      Jerking/twitching episodes today; unclear cause.  May be myoclonus due to recent medications.  - check orthostatic VS  - continue monitoring  - Neurology evaluation    Oversedation secondary to opioid analgesia; noted 6/19 afternoon.  Improved today.  Acute on chronic respiratory failure with hypoxia and hypercapnia  Respiratory acidosis, acute.   Will discontinue all opioid analgesia.   Avoid sedating medications. Discontinue lorazepam, and Percocet.   Decrease gabapentin to 100 mg po BID  Order heat and ice therapy, topical lidocaine ointment and diclofenac topical as needed for joint and back pain.   Order scheduled Tylenol 975 mg po TID.   Opioid reversal protocol, q15 min vital signs and RASS assessment.   BIPAP 12/5 ordered. RCAT to follow.   Transfer to intermediate care bed.        Pulmonary nodules.   Suspicion for metastatic renal cell cancer.   Status post CT guided biopsy on 6/14/23.   Oncology following, outpatient follow up in [facility].      Chronic respiratory failure with hypoxia  COPD  Elevated d-dimer  CT chest severe emphysema, bilateral pulmonary nodules.  Lower extremity US negative for DVT.   NM VQ scan low probability for PE.   Continue home inhalers and supplemental oxygen at 2L/min  Completed prednisone taper.      Right Renal oncocytic tumor grade 2   CT abdomen on 6/12/23 with enlarging renal mass 8.3 cm.  Core biopsy on 9/29/22 of right renal mass.   Follow up with Dr. Erickson 7/2023 for further discussion for renal mass management.      Acute kidney injury on Chronic kidney disease stage 3b.   Mild hyperkalemia  Creatinine baseline 2.0  Potassium 5.2->4.6->5.2->4.8.  Urine protein creatinine ratio still pending.   Albuminuria on  UA.   Hold lisinopril.  Hold furosemide.   NO NSAIDS.  Appreciate Nephrology recommendations.      Gross hematuria  Chronic normocytic anemia  Anemia of chronic disease  Hemoglobin 9.0 g/dL stable.   Iron studies iron sat low at 40, ferritin normal at 245, TIBC low 208 reticulocyte count, iron sat index normal at 19. Vitamin b12 level normal at 454.   CBC daily.   Transfuse for hemoglobin < 7 g/dL.   See Urology follow up recommendation below.      Abnormal UA  No urinary tract infection.  Discontinue ceftriaxone as culture grew mixture of urogenital bhavya < 10,000.     Heart failure with preserved ejection fraction.   113 kg to 107 kg on last weight on 6/16.  BNP elevated 545 on admission.  Transthoracic echocardiogram LVEF hyperdynamic 65-70%, no WMA.  Holding furosemide 20 mg po daily.     Hypertensive urgency - resolved.   Resistant hypertension  Home PTA medications: atenolol 25 mg daily,   Hold Lisinopril 20 mg daily for ELISABETH, hyperkalemia.   Continue Amlodipine 10 mg po daily.  Continue hydralazine 50 mg po TID with hold parameters.   Fair control.     Abdominal pain  Ventral abdominal hernia  History of hernia repair with mesh.   General surgery was consulted, no surgical intervention recommended at this time.   Lactic acid normal.  Discontinue oral/IV hydromorphone  Percocet 5/325mg 1 to 2 tabs q4h prn.  Outpatient follow up with hernia specialist in future.      GERD with possible esophagitis.   pantoprazole 40 mg po BID  sucralfate QID AC HS     Constipation   Bisacodyl suppository 10 mg rectal daily prn.   Miralax 17 g po BID.  Senna docusate 2 tabs BID  Milk of mag daily prn.     Chest pain atypical.   Atypical for cardiac pain, palpation reproduces pain.   Optimize pain management.  EKG, CXR, troponin I unremarkable.   Believe most of pain is associated with ventral hernia   Fall initial encounter  Degenerative disc disease lumbar spine.   Coccyx pain  CT head negative for acute pathology.  Xray  "thoracic and lumbar spine with previous posterior fusion L4-L5 and no complication of hardware, no acute fracture.  Multilevel degenerative disc disease noted.   PT and OT evaluation  Continue pain control as above.   Hyperlipidemia  Continue home statin.   Peripheral neuropathy  gabapentin 300 mg BID prn.   Sleep apnea  Non compliant with CPAP        Diet: 2 Gram Sodium Diet    DVT Prophylaxis: Pneumatic Compression Devices  Hill Catheter: Not present  Lines: None     Cardiac Monitoring: None  Code Status: DNR-DNI (per discussion by Palliative team izaiah/ Soto today)        Clinically Significant Risk Factors      Acute Kidney Injury, unspecified: based on a >150% or 0.3 mg/dL increase in last creatinine compared to past 90 day average, will monitor renal function  Overweight: Estimated body mass index is 29.82 kg/m  as calculated from the following:    Height as of this encounter: 1.905 m (6' 3\").    Weight as of this encounter: 108.2 kg (238 lb 8.6 oz).    Disposition Plan: to home, with Hospice    Expected Discharge Date: 2-3 days               D. Jasmeet Pichardo MD, [place] Hospitalist  Text Page (7am - 6pm)    Interval History   Reviewed care w/ RN's, Palliative care team and mary Valera; and w/ daughters Flakita and Vangie (on phone); family friend present.  New: sudden jerking/weakness of legs/arms when standing today and when sitting up in bed; no lightheadedness at the time.  No f/c/SOB or chest/abdominal Sx.    Data reviewed today: I reviewed all new labs and imaging results over the last 24 hours.    Physical Exam   Temp: 98.2  F (36.8  C) Temp src: Oral BP: (!) 164/73 Pulse: 73   Resp: 20 SpO2: 97 % O2 Device: Nasal cannula Oxygen Delivery: 2 LPM  Vitals:    06/17/23 0600 06/19/23 0622 06/20/23 0334   Weight: 107 kg (235 lb 14.3 oz) 108.2 kg (238 lb 8.6 oz) 106.6 kg (235 lb 0.2 oz)     Vital Signs with Ranges  Temp:  [97  F (36.1  C)-98.2  F (36.8  C)] 98.2  F (36.8  C)  Pulse:  [58-74] " 73  Resp:  [20-28] 20  BP: (127-164)/(53-73) 164/73  FiO2 (%):  [30 %] 30 %  SpO2:  [89 %-100 %] 97 %  I/O last 3 completed shifts:  In: 520 [P.O.:120; I.V.:400]  Out: 1100 [Urine:1100]    Constitutional: awake, no apparent distress; lying in bed; sits up w/ assist of 1.  HEENT: sclerae clear; MM's moist  Respiratory: fair a/e bilaterally; decreased at bases; no wheezing or rhonchi  Cardiovascular: regular rate and rhythm, S1, S2 noted; no m/r/g  GI: abdomen obese, + bowel sounds; soft, mild, diffuse tenderness, non-distended  Skin/Integumen: no rashes, no cyanosis, no jaundice  Musculoskeletal: trace edema dorsal feet; tender over these areas  Neurologic: follows directions well; has jerking/twitching movements both arms when sitting up, and with hands when trying to use his cell phone; no focal deficits      Medications     - MEDICATION INSTRUCTIONS -       ACE/ARB/ARNI NOT PRESCRIBED       sodium chloride 0.9%         acetaminophen  975 mg Oral Q8H     amLODIPine  5 mg Oral Daily     atenolol  25 mg Oral QAM     cefTRIAXone  2 g Intravenous Q24H     diclofenac  4 g Topical 4x Daily     [Held by provider] furosemide  20 mg Oral Daily     hydrALAZINE  50 mg Oral Q8H RACHID     iron sucrose  200 mg Intravenous Q24H     pantoprazole  40 mg Oral BID AC     polyethylene glycol  17 g Oral BID     senna-docusate  1 tablet Oral BID     simvastatin  20 mg Oral QPM     sodium chloride (PF)  3 mL Intracatheter Q8H     sucralfate  1 g Oral 4x Daily AC & HS       Data   Recent Labs   Lab 06/20/23  0550 06/19/23  1634 06/19/23  1211 06/18/23  1010 06/15/23  0807 06/15/23  0053   WBC 8.8  --  9.4 9.4   < > 7.4   HGB 9.0*  --  9.6* 9.5*   < > 9.0*     --  101* 97   < > 95     --  181 162   < > 182    141 140 142   < > 144   POTASSIUM 5.0 5.3* 5.7* 4.8   < > 5.0   CHLORIDE 100 98 98 98   < > 101   CO2 34* 34* 32* 35*   < > 32*   BUN 71* 71* 69* 66*   < > 41*   CR 3.11* 3.09* 3.19* 2.80*   < > 2.21*   ANIONGAP 8 9  10 9   < > 11   LEENA 9.1 9.2 9.3 9.5   < > 10.0   * 143* 152* 131*   < > 134*   ALBUMIN  --   --  3.7  --   --  3.8   PROTTOTAL  --   --  6.9  --   --  7.2   BILITOTAL  --   --  0.3  --   --  0.5   ALKPHOS  --   --  59  --   --  64   ALT  --   --  16  --   --  12   AST  --   --  29  --   --  23    < > = values in this interval not displayed.

## 2023-06-20 NOTE — PROGRESS NOTES
"PALLIATIVE CARE PROGRESS NOTE  [facility]     Patient Name: Soto Gibbs  Date of Admission: 6/12/2023   Today the patient was seen for: goals of care discussion     Recommendations & Counseling       GOALS OF CARE:     Family meeting today - see discussion below.    Patient declines transitional care for strengthening.  Desires to discharge home. Understands now that without strengthening, he cannot do cancer directed treatments.     Care management consulted for hospice at discharge.    Recommend follow-up with outpatient palliative care for ongoing goals care discussion should patient not involve hospice at discharge.      ADVANCE CARE PLANNING:    No health care directive on file. Per  informed consent policy next of kin should be involved if patient becomes unable.    Patient continues to \"think about\" who he would want to place on HCD document.    There is no POLST form on file, plan to complete prior to DC.    Discussed POLST and Honoring Choices documents with daughters today.  They report that they have been given these documents before and patient has not been interested in completing them.    Code status:  After discussion today, patient wishes to change CODE STATUS to DNR/I.     MEDICAL MANAGEMENT:   #Pain, kidney cancer related pain- right flank pain    Acetaminophen (Tylenol), PRN    Anti-convulsants:  Gabapentin (Neurontin) -decrease dosing until patient more alert.    Heat and Physical Therapy     Avoid opiates at this time patient oversedated and somnolent.  Has received Narcan.    Patient previously on and weaned off steroids.  Did not seem to substantially assist with minimizing pain or discomfort.  Steroids can help with capsular stretch and inflammation.     #Delirium  #Agitation    Avoid benzodiazepines, antihistamines, anticholinergics if able.    Lights on and blinds open during the day.  Reorient frequently.    Lights & TV off during the night.  Promote normal " "circadian rhythm.    Limit sensory deprivation - utilize hearing aids, glasses, etc.    Frequently assess basic needs such as temperature, elimination, thirst/hunger, pain    Consider bedside attendant (if able) for additional safety    Other Narcan protocol for opiate-induced sedation     #General Weakness/Debility - severe, legs buckling beneath him with standing    Physical Therapy    Occupational therapy    Recommend TCU at discharge.  Patient declining at desires to go home.  Concern for home safety.     PSYCHOSOCIAL/SPIRITUAL:    Family and friends     Palliative Care will continue to follow along. Thank you for the consult and allowing us to aid in the care of Soto Gibbs.    These recommendations have been discussed with Anna Hart RN, Shena Sheppard RN and Dr. Pichardo.    JAMES Garcia APRN, CNS, AOCNS  Securely message with [employer] info)  Text page via [facility] Paging/Directory         Assessments           Soto Gibbs is a 83 year old male with a past medical history of FORTUNATO, COPD on chronic O2, CKD 3, HTN, neuropathy, HLD, who presented on 6/12/2023 after sustaining a fall, abdominal pain, ELISABETH, enlarging right renal mass initially diagnosed in 2022.  Per urology's note patient had an enlarging right renal mass initially 4.9 cm on 3/2/2022 and has continued to increase in size.  8/25 2022 patient underwent right renal mass biopsy at [facility] pathology revealed renal oncocytic tumor, histologic grade 2.  10/2022 Patient followed up with Dr. Myers in the outpatient clinic. Per daughter Vangie, surgical intervention was not offered. She wishes the medical record be clear that patient did not \"declined surgery\" it was not offered.  Patient has subsequently been following with Dr. Bains since 5/23/2023.  With disease progression and likely pulmonary mets from renal cell carcinoma (still awaiting final pathology results), treatment would be more palliative in nature per " oncology.  6/12/2023 CT scan showed 8.3 x 8.2 x 6.0 cm mass in mid to upper right kidney and 2.4 cm right mid renal cyst  6/14/2023 CT-guided right lower lobe biopsy.  Pathology pending.  6/15/2023 chest CT shows emphysema, multiple bilateral pulmonary nodules.  6/16/23 VQ scan negative for PE.      Prognosis, Goals, & Planning:   Prognosis, Goals, and/or Advance Care Planning addressed today: spent with patient and family 5640 - 5740 and 0350 - 4089    Met in patient room with patient, daughter, Danni, in person and daughter, Vangie, via telephone and was later joined by dear friend of patient, Myles (Daniel).     Patient's medical condition, current treatments and the like reviewed.  Reviewed that potential treatments would not be curative and would be palliative in intent.  Discussed that without weakness would limit his ability to tolerate treatments.  Patient declines TCU for strengthening.  Barely able to get up and ambulate or stand today with PT and to get to bedside commode.  Patient understands that with or without treatment cancer will continue to progress.    Discussed benefit versus burden of resuscitation and intubation with him.  Patient acknowledges desires after discussion with daughter for DNR/I.  He agrees with having the order placed to reflect DNR/during hospitalization.    Patient has he desires to be at home for his remaining days.  Family will honor this.  Reviewed the role of the hospice program with family.  Patient declined further discussions as was tired so daughters, friend and I stepped out and reviewed discussed neck steps together.      Discussed continuing current treatments and cares while hospitalized while working to get connected with the hospice team.  Patient does have a split-level home and would need to be carried up to get into bed.  Family believe that they would be agreeable with involving hospice at discharge since they are not pursuing medical treatment.  Discussed that  its not a constipation or family as covered Medicare.    DaughterVangie trying to decide when to come spend time with her father.  Discussed discharge in terms of the next 2 to 3 days potentially as long as discharge disposition is identified    Code Status was addressed today:     Yes, We discussed potential risks and rationale of attempting cardiac resuscitation, intubation, and mechanical ventilation.  We also discussed probability of survival as well as quality of life implications.  Based on this discussion, patient or surrogate response/decision: DNR/I      Coping, Meaning, & Spirituality:   Mood, coping, and/or meaning in the context of serious illness were addressed today: Yes         Interval History:     Chart review/discussion with unit or clinical team members:   6/19/2023 Narcan given x2, venous blood gas pH 7.26, pCO2 83, HCO3 37 abd O2 50.1 so patient  and transferred to intermediate care for BiPap. Placed on FiO2 30%.   Creatinine 3.1.           Review of Systems:     ROS was reviewed.  Pain: none  Dyspnea: mild at times, worsens with exertion  Nausea: None  Weakness/fatigue: Severe  Anxiety: None  Constipation: Last BM 6/19/2023               Physical Exam:   Temp:  [97  F (36.1  C)-98  F (36.7  C)] 97.9  F (36.6  C)  Pulse:  [58-74] 67  Resp:  [18-28] 20  BP: (114-161)/(53-73) 128/60  FiO2 (%):  [30 %] 30 %  SpO2:  [91 %-100 %] 96 %  235 lbs .17 oz    General appearance: alert, appears stated age, fatigued and no distress  Head: Normocephalic, without obvious abnormality, atraumatic  Eyes: Sclera anicteric.  Pupils round.  Nose: No discharge.  NC in place  Throat: Oral mucosa moist.  Lips without lesions.  Lungs: clear to auscultation bilaterally  Heart: Regular rate and rhythm  Abdomen: Soft, non-tender, non-distended, +BS  Extremities: trace lower extremity edema  Pulses: 2+ and symmetric  Neurologic: oriented x3. alert           Current Problem List:   Principal Problem:    Symptomatic  anemia  Active Problems:    Generalized muscle weakness    Fall, initial encounter    ELISABETH (acute kidney injury) (H)      Allergies   Allergen Reactions     Morphine (Pf) [Morphine] Other (See Comments) and Dizziness     Hallucinations            Data Reviewed:       Results for orders placed or performed during the hospital encounter of 06/12/23 (from the past 24 hour(s))   Basic metabolic panel   Result Value Ref Range    Sodium 141 136 - 145 mmol/L    Potassium 5.3 (H) 3.5 - 5.0 mmol/L    Chloride 98 98 - 107 mmol/L    Carbon Dioxide (CO2) 34 (H) 22 - 31 mmol/L    Anion Gap 9 5 - 18 mmol/L    Urea Nitrogen 71 (H) 8 - 28 mg/dL    Creatinine 3.09 (H) 0.70 - 1.30 mg/dL    Calcium 9.2 8.5 - 10.5 mg/dL    Glucose 143 (H) 70 - 125 mg/dL    GFR Estimate 19 (L) >60 mL/min/1.73m2   Blood gas venous   Result Value Ref Range    pH Venous 7.19 (LL) 7.35 - 7.45    pCO2 Venous 97 (H) 35 - 50 mm Hg    pO2 Venous 34 25 - 47 mm Hg    Bicarbonate Venous 37 (H) 24 - 30 mmol/L    Base Excess/Deficit (+/-) 9.0   mmol/L    Oxyhemoglobin Venous 60.5 (L) 70.0 - 75.0 %    O2 Sat, Venous 61.9 (L) 70.0 - 75.0 %   Lactic acid whole blood   Result Value Ref Range    Lactic Acid 1.2 0.7 - 2.0 mmol/L   Blood gas venous   Result Value Ref Range    pH Venous 7.26 (L) 7.35 - 7.45    pCO2 Venous 83 (H) 35 - 50 mm Hg    pO2 Venous 27 25 - 47 mm Hg    Bicarbonate Venous 37 (H) 24 - 30 mmol/L    Base Excess/Deficit (+/-) 9.4   mmol/L    Oxyhemoglobin Venous 49.1 (L) 70.0 - 75.0 %    O2 Sat, Venous 50.1 (L) 70.0 - 75.0 %   CBC with platelets   Result Value Ref Range    WBC Count 8.8 4.0 - 11.0 10e3/uL    RBC Count 3.14 (L) 4.40 - 5.90 10e6/uL    Hemoglobin 9.0 (L) 13.3 - 17.7 g/dL    Hematocrit 31.5 (L) 40.0 - 53.0 %     78 - 100 fL    MCH 28.7 26.5 - 33.0 pg    MCHC 28.6 (L) 31.5 - 36.5 g/dL    RDW 15.3 (H) 10.0 - 15.0 %    Platelet Count 152 150 - 450 10e3/uL   Basic metabolic panel   Result Value Ref Range    Sodium 142 136 - 145 mmol/L     Potassium 5.0 3.5 - 5.0 mmol/L    Chloride 100 98 - 107 mmol/L    Carbon Dioxide (CO2) 34 (H) 22 - 31 mmol/L    Anion Gap 8 5 - 18 mmol/L    Urea Nitrogen 71 (H) 8 - 28 mg/dL    Creatinine 3.11 (H) 0.70 - 1.30 mg/dL    Calcium 9.1 8.5 - 10.5 mg/dL    Glucose 146 (H) 70 - 125 mg/dL    GFR Estimate 19 (L) >60 mL/min/1.73m2   Hemoglobin   Result Value Ref Range    Hemoglobin 9.1 (L) 13.3 - 17.7 g/dL        80 MINUTES SPENT BY ME on the date of service doing chart review, history, exam, documentation & further activities per the note.

## 2023-06-20 NOTE — PROGRESS NOTES
Re-called consult for follow up @1939 PM Spoke to Blair @answering services. Dr Ventura on call for [facility] 6/20/23.    Neurology Consult:  [facility] page sent to Neurology @ 16:38 PM.

## 2023-06-20 NOTE — PROGRESS NOTES
"Care Management Follow Up    Length of Stay (days): 7    Expected Discharge Date: 06/21/2023     Concerns to be Addressed:       Patient plan of care discussed at interdisciplinary rounds: Yes    Anticipated Discharge Disposition: Home, Home Care Anticipated Discharge Services:  ([facility] for PT/HHA)  Anticipated Discharge DME:  (nothing new)    Patient/family educated on Medicare website which has current facility and service quality ratings: yes Education Provided on the Discharge Plan: Yes  Patient/Family in Agreement with the Plan: yes    Referrals Placed by CM/SW: Homecare  Private pay costs discussed: Not applicable    Additional Information:  Per chart review, pt declines TCU.    [facility]    Social History: \"CM completed assessment with patient. He lives alone. Independent with all cares but does not drive- \"my friends do that\". Has CPAP and home 02- 2L continuous supplied by [employer]. He uses a cane outside the home and walker inside the home.    Mentions he previously refused offers for HC services but is now agreeable to Home PT and HHA only; CM sent referral. He is not interested in TCU placement. He declines offers to include one of his children in admission or discharge planning. He states he will \"call a friend\" to transport at hospital discharge and friend will bring portable 02.\"    Palliative is following.  Goals of care to be further discussed.    Final discharge plans pending clinical progression and discharge recommendations.  CM will continue to follow.    Darryl Chou RN      "

## 2023-06-20 NOTE — PLAN OF CARE
Patient A&O x4 and pleasant. VSS on 2L via NC. Tele reads sinus rhythm. Pain is managed with PRN PO tylenol, was somewhat effective. Patient ambulates with Ax2, gait belt, and walker. Patient abbey under own weight, bedside commode utilized. PIV is patent and saline locked. Tolerating oral intake and voiding appropriately. Hematuria continues, external catheter in place. Last BM on 6/19. Palliative consulted with pt and family to discuss plan of care, hospice to be consulted, code status changed this afternoon. Patient resting in bed. Family at bedside. Patient is safe, will continue to monitor.    Shena Sheppard RN      Problem: Risk for Delirium  Goal: Improved Sleep  Outcome: Progressing     Problem: Pain Acute (Oncology Care)  Goal: Optimal Pain Control  Outcome: Progressing  Intervention: Prevent or Manage Pain  Recent Flowsheet Documentation  Taken 6/20/2023 0900 by Shena Sheppard RN  Medication Review/Management: medications reviewed     Problem: Gas Exchange Impaired  Goal: Optimal Gas Exchange  Outcome: Progressing  Intervention: Optimize Oxygenation and Ventilation  Recent Flowsheet Documentation  Taken 6/20/2023 0900 by Shena Sheppard RN  Head of Bed (HOB) Positioning: HOB at 20 degrees   Goal Outcome Evaluation:

## 2023-06-20 NOTE — PLAN OF CARE
Pt comfortable on Bipap continuous. Alert and oriented upon arrival to unit. Pt denies pain. Hourly rounding done and room door open.   Problem: Pain Acute (Oncology Care)  Goal: Optimal Pain Control  6/19/2023 2249 by Natty Allen, RN  Outcome: Progressing  6/19/2023 2249 by Natty Allen, RN  Outcome: Progressing  Intervention: Prevent or Manage Pain  Recent Flowsheet Documentation  Taken 6/19/2023 2100 by Natty Allen, RN  Medication Review/Management: medications reviewed  Taken 6/19/2023 1800 by Natty Allen, RN  Medication Review/Management: medications reviewed     Problem: Gas Exchange Impaired  Goal: Optimal Gas Exchange  Outcome: Progressing   Goal Outcome Evaluation:

## 2023-06-20 NOTE — PROGRESS NOTES
RENAL PROGRESS NOTE    ASSESSMENT & PLAN:   Non-Oliguric ELISABETH: Baseline creatinine around 2.0, serially rising since 6/14/2023.  This is likely an ATN injury secondary to prerenal hemodynamics with relative hypotension after treatment of hypertensive urgency, poor oral intakes, and attempts at diuresis for CHF.  Nephrology was consulted 6/18/2023, furosemide and lisinopril on hold, started normal saline at 75 cc/h.    He is taking better oral today.  This is likely ATN and I do not think IV hydration will be of much help, likely just need to wait out course, also need to be cautious with IVF in the setting of CHF.  We will discontinue IV fluids today.  He is nonoliguric.  Did have imaging this admit with no obstructive physiology, renal mass is increasing.  UA difficult to interpret in the setting of gross hematuria secondary to RCC.  Urology was consulted this admit.   Would be a poor long-term dialysis candidate if it came down to it.  Depending on oncology and urology recommendations/ treatment potential of his metastatic disease, he may be a short-term dialysis patient.  Agree with palliative care consult per primary team to establish goals of care.     CKD IIIb: baseline Cr ~2.0 since 2020 etiology likely 2/2 hypertensive nephrosclerosis, age related changes, R renal mass/RCC and diabetic nephropathy can not be ruled out (A1C ~7.0 since 2014). Renal imaging with no hydro/+R renal mass. + proteinuria on UA since 2020.  UPCR with around 1.9 g on spot check.     Anemia: Ongoing losses with gross hematuria secondary to RCC.  Hemoglobin in the 9 range.  Iron deficient.  We will give some parenteral iron in the setting of ongoing bleed and iron deficiency.  B12 and folate were replete.  Certainly can see anemia of chronic disease at this eGFR, will not check erythropoietin level because we would not give MAYA in the setting of malignancy either way.  Transfusions per hospital service hemoglobin <7    HTN: on  "atenolol 25, amlodipine 5, hydralazine 50mg TID with hold parameters. ACE and lasix on hold with ELISABETH.  Placed hold parameters on antihypertensive medications.    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  S/p Lokelma 6/19/2023.  Potassium has normalized at 5.0.  Placed on renal diet.     Metastatic RCC: Urology consulted this admit.  Apparently patient refusing nephrectomy in past.  Has follow-up with Dr. Erickson 7/2023 for further management options.     Gross hematuria: likely 2/2 RCC.  Monitor PVRs/bladder scans. No concerns for clot retention per urology. Urine remains blood tinged. No urgent urological procedures indicated per Urology note. Maintain follow up appt with Dr. Erickson 7/20/23 to further discuss options for renal mass management     Pulmonary nodules: suspicion for metastatic RCC. S/P CT guided Bx 6/14/23. ONC following.     Chronic Respiratory failure with hypoxia/COPD: elevated D-dimer. + chest CT/severe emphysema/BL pulmonary Nodules. Lower Extremity US Neg for DVT. NM VQ low probability for PE. Has inhalers. On prednisone taper.      HLD: on statin     CPAP: External compliance with CPAP     DM:  Insulin per hospital service     Peripheral Neuropathy: on low-dose gabapentin as needed     HFpEF: . Echo 65-70%, no WMA.  S/p diuresis with IV furosemide, creatinine rising with ATN injury and now diuretics on hold.    SUBJECTIVE:    Patient seen bedside  Family present  Meeting with palliative care team regarding establishing goals of care  He is amendable to nephrology follow-up outpatient  Discussed his renal function has stalled out above baseline  Discussed likely etiology  Making \"too much urine\"  Breathing stable  Appetite is subpar, had a very small breakfast, drinking apple juice, couple sips of his Nepro protein shake  OBJECTIVE:  Physical Exam   Temp: 98.2  F (36.8  C) Temp src: Oral BP: 134/62 Pulse: 72   Resp: 20 SpO2: 97 % O2 Device: Nasal " cannula Oxygen Delivery: 2 LPM  Vitals:    06/17/23 0600 06/19/23 0622 06/20/23 0334   Weight: 107 kg (235 lb 14.3 oz) 108.2 kg (238 lb 8.6 oz) 106.6 kg (235 lb 0.2 oz)     Vital Signs with Ranges  Temp:  [97  F (36.1  C)-98.2  F (36.8  C)] 98.2  F (36.8  C)  Pulse:  [58-74] 72  Resp:  [20-28] 20  BP: (127-161)/(53-70) 134/62  FiO2 (%):  [30 %] 30 %  SpO2:  [89 %-100 %] 97 %  I/O last 3 completed shifts:  In: 520 [P.O.:120; I.V.:400]  Out: 1100 [Urine:1100]    Patient Vitals for the past 72 hrs:   Weight   06/20/23 0334 106.6 kg (235 lb 0.2 oz)   06/19/23 0622 108.2 kg (238 lb 8.6 oz)       Intake/Output Summary (Last 24 hours) at 6/19/2023 1344  Last data filed at 6/19/2023 0622  Gross per 24 hour   Intake 924 ml   Output 600 ml   Net 324 ml       PHYSICAL EXAM:  General: Somnolent, does not answer questions for me.  HENT: Supple, Non-tender, no obvious JVD  Eyes: No scleral icterus  Cardiovascular: RRR, no rub, gallop, or murmur.   Extremities: Trace lower extremity edema  Respiratory: A bit coarse, 2 LNC  Gastrointestinal: Soft, non-tender, non-distended  Musculoskeletal: Grossly normal   Integumentary: Warm, dry, no rash  Neurologic: Non focal   Psychiatric: Somnolent  : External male catheter.  Blood-tinged UOP.    LABORATORY STUDIES:     Recent Labs   Lab 06/20/23  0550 06/19/23  1211 06/18/23  1010 06/17/23  1041 06/15/23  0053 06/14/23  0550   WBC 8.8 9.4 9.4 10.6 7.4 7.4   RBC 3.14* 3.36* 3.31* 3.37* 3.09* 3.12*   HGB 9.0* 9.6* 9.5* 9.7* 9.0* 9.0*   HCT 31.5* 33.8* 32.2* 32.5* 29.2* 29.6*    181 162 199 182 171       Basic Metabolic Panel:  Recent Labs   Lab 06/20/23  0550 06/19/23  1634 06/19/23  1211 06/18/23  1010 06/17/23  1041 06/16/23  0902    141 140 142 143 140   POTASSIUM 5.0 5.3* 5.7* 4.8 4.9 5.4*  5.4*   CHLORIDE 100 98 98 98 97* 100   CO2 34* 34* 32* 35* 37* 29   BUN 71* 71* 69* 66* 58* 50*   CR 3.11* 3.09* 3.19* 2.80* 2.76* 2.52*   * 143* 152* 131* 151* 144*   LEENA 9.1 9.2  9.3 9.5 10.1 9.6       INRNo lab results found in last 7 days.     Recent Labs   Lab Test 06/20/23  0550 06/19/23  1211 09/07/22  0449 09/06/22  0401   INR  --   --   --  1.10   WBC 8.8 9.4   < > 8.2   HGB 9.0* 9.6*   < > 10.8*    181   < > 280    < > = values in this interval not displayed.       Personally reviewed current labs    This note was dictated using voice recognition     Arjun Garcia PA-C  [facility]  [phone]

## 2023-06-21 NOTE — CONSULTS
"Care Management Follow Up    Length of Stay (days): 8    Expected Discharge Date: 06/22/2023     Concerns to be Addressed:       Patient plan of care discussed at interdisciplinary rounds: Yes    Anticipated Discharge Disposition: Home, Hospice  Anticipated Discharge Services:  Hospice  Anticipated Discharge DME: Oxygen (has home O2)    Patient/family educated on Medicare website which has current facility and service quality ratings: Yes  Education Provided on the Discharge Plan: Yes  Patient/Family in Agreement with the Plan: yes    Referrals Placed by CM/SW: Hospice  Private pay costs discussed: Not applicable    Additional Information:  CM consulted for hospice at discharge.    Per Palliative note from yesterday 6/20:     \"Patient declines transitional care for strengthening.  Desires to discharge home. Understands now that without strengthening, he cannot do cancer directed treatments.    Care management consulted for hospice at discharge.    Recommend follow-up with outpatient palliative care for ongoing goals care discussion should patient not involve hospice at discharge.\"    Michael Boggs [phone] - Per Flakita, pt's daughter Vangie resides in [place] and would like to be involved in this conversation regarding hospice/discharge planning.  Flakita requests a phone call from  later this afternoon due to Vangie being in a different time zone.    1:17 PM  Daughter Flakita and Daughter Vangie - Discussed discharge plans with both daughters.  Goal is for discharge to home with hospice care per pt's request.  They do not want pt placed into a facility.  Daughters aware that hospice care would be intermittent care, agreeable to this.  Flakita and Vangie exploring hospice agency choices via Medicare.gov website and will contact CM at a later time.    Transportation - Anticipate need for stretcher transport at time of discharge.  Pt currently requiring marianna lift.  Per palliative note 6/20: \"Patient does have a " "split-level home and would need to be carried up to get into bed.\"  PCS needed.    Pt has home O2 via [employer] and CPAP machine at home.  Hospice agency can coordinate home O2 for patient after discharge.    3:38 PM  Melyssa Boggs - After reviewing hospice agencies in the area, [facility] is family's preferred hospice agency.  Second choice: [facility].    [facility] ([phone]) - Referral sent.  Per Debbie in Intake department, pt resides in [facility] - which is covered by [facility].  Update given to melyssa Boggs.    [facility] ([phone]) - Referral sent.  Spoke to Mackenzie in Care Transitions ([phone]), will check in with Charge RN on coverage for pt's home address.  Likely will not be able to accommodate a hospice admit tomorrow.  Update given to melyssa Boggs.    [facility] - is reviewing the referral forwarded to them via [facility]    Anticipate discharge to home with hospice, likely will need stretcher transport.    CM will continue to follow.    Darryl Chou RN        "

## 2023-06-21 NOTE — PLAN OF CARE
Patient A&O. Having muscle jerking/tremors that started today. Patient unable to hold themselves up without assistance. Heavy assist of 3. 2L nasal cannula. BP elevated. C/O pain during the shift scheduled and PRN pain medications given. Will continue to monitor and follow plan of care.

## 2023-06-21 NOTE — PROGRESS NOTES
PALLIATIVE CARE PROGRESS NOTE  [facility]     Patient Name: Soto Gibbs  Date of Admission: 6/12/2023   Today the patient was seen for: goals of care and family support     Recommendations & Counseling       GOALS OF CARE:     Patient declines transitional care and wishes to discharge home with hospice.     Care management consulted for hospice at discharge.    Family looking into hospice agencies and will let care management know what they had chosen.    Patient declines completion of POLST today - was enjoying his lunch.     ADVANCE CARE PLANNING:    No health care directive on file. Per  informed consent policy next of kin should be involved if patient becomes unable.    Patient declines completion of HCD and POLST documents.    Discussed POLST and Honoring Choices documents with daughters 6/20/2023.  They have been given these documents previously and he has not been interested in completing them.    Code status:  DNR/I.     MEDICAL MANAGEMENT:   #Pain, kidney cancer related pain- right flank pain    Acetaminophen (Tylenol), PRN    Anti-convulsants:  Gabapentin (Neurontin) -decrease dosing until patient more alert. Continue at 100 mg po BID    Heat prn    Patient previously on and weaned off steroids.  Did not seem to substantially assist with minimizing pain or discomfort.  Steroids can help with capsular stretch and inflammation.    Oxycodone possibly contributing to myoclonus though less likely than Morphine. Will discuss opiate rotate to Dilaudid - safer medication for impaired renal function. Consider dilaudid 2 gm po q4h prn.     #Delirium  #Agitation    Avoid benzodiazepines, antihistamines, anticholinergics if able.    Lights on and blinds open during the day.  Reorient frequently.    Lights & TV off during the night.  Promote normal circadian rhythm.    Limit sensory deprivation - utilize hearing aids, glasses, etc.    Frequently assess basic needs such as temperature,  "elimination, thirst/hunger, pain    Consider bedside attendant (if able) for additional safety    Other Narcan protocol for opiate-induced sedation     #General Weakness/Debility - severe, legs buckling beneath him with standing    Physical Therapy    Occupational therapy    Recommend TCU at discharge.  Patient declining at desires to go home.  Concern for home safety.     PSYCHOSOCIAL/SPIRITUAL:    Family and friends       Palliative Care will follow along peripherally as goals currently established and symptoms managed. Thank you for the consult and allowing us to aid in the care of Soto Gibbs.    JAMES Garcia, APRN, CNS, AOCNS  Securely message with [employer] (more info)  Text page via [facility] Paging/Directory         Assessments           Soto Gibbs is a 83 year old male with a past medical history of FORTUNATO, COPD on chronic O2, CKD 3, HTN, neuropathy, HLD, who presented on 6/12/2023 after sustaining a fall, abdominal pain, ELISABETH, enlarging right renal mass initially diagnosed in 2022.  Per urology's note patient had an enlarging right renal mass initially 4.9 cm on 3/2/2022 and has continued to increase in size.  8/25 2022 patient underwent right renal mass biopsy at [facility] pathology revealed renal oncocytic tumor, histologic grade 2.  10/2022 Patient followed up with Dr. Myers in the outpatient clinic. Per daughter Vangie, surgical intervention was not offered. She wishes the medical record be clear that patient did not \"declined surgery\" it was not offered.  Patient has subsequently been following with Dr. Bains since 5/23/2023.  With disease progression and likely pulmonary mets from renal cell carcinoma (still awaiting final pathology results), treatment would be more palliative in nature per oncology.  6/12/2023 CT scan showed 8.3 x 8.2 x 6.0 cm mass in mid to upper right kidney and 2.4 cm right mid renal cyst  6/14/2023 CT-guided right lower lobe biopsy.  Pathology reveals renal cell " carcinoma.  6/15/2023 chest CT shows emphysema, multiple bilateral pulmonary nodules.  6/16/23 VQ scan negative for PE.      Prognosis, Goals, & Planning:   Prognosis, Goals, and/or Advance Care Planning addressed today:    family looking into hospice agencies to involve at discharge. They will notify care maangement of their decision.         Interval History:     Chart review/discussion with unit or clinical team members:   [facility] Biopsy pathology positive for renal cell carcinoma. Increased myoclonus noted by staff 6/20. Increased weakness and fatigue, heavy assist of 3 to get up.    Per patient or family/caregivers today:  Family partnering with care management to determine discharge plan and with which hospice agency. Patient feels he has made his decisions. No further discussion needed. Plan is to discharge home with hospice.           Review of Systems:     ROS was reviewed.  Pain: denies  Dyspnea: denies  Nausea: denies  Weakness/fatigue: severe  Anxiety: denies  Constipation: last BM 6/19/2023               Physical Exam:   Temp:  [98  F (36.7  C)-98.4  F (36.9  C)] 98.2  F (36.8  C)  Pulse:  [60-78] 67  Resp:  [20-22] 20  BP: (134-183)/(62-78) 161/69  FiO2 (%):  [30 %] 30 %  SpO2:  [89 %-99 %] 97 %  235 lbs .17 oz  General appearance: alert, fatigued and no distress  Head: Normocephalic, without obvious abnormality, atraumatic  Eyes: Sclera anicteric.  Pupils round.  Nose: No discharge.  NC in place  Throat: Oral mucosa moist.  Lips without lesions.  Lungs: non-labored  Extremities: trace lower extremity edema  Neurologic: alert. oriented x4            Current Problem List:   Principal Problem:    Symptomatic anemia  Active Problems:    Generalized muscle weakness    Fall, initial encounter    ELISABETH (acute kidney injury) (H)      Allergies   Allergen Reactions     Morphine (Pf) [Morphine] Other (See Comments) and Dizziness     Hallucinations            Data Reviewed:       No results found for this or any  previous visit (from the past 24 hour(s)).       40 MINUTES SPENT BY ME on the date of service doing chart review, history, exam, documentation & further activities per the note.

## 2023-06-21 NOTE — PROGRESS NOTES
RT Note:    Pt on 2L and tolerated well, BiPAP usage as needed. Offered BiPAP at night for usage and pt was willing to go on. BiPAP settings per assessment 12/6 R14 30% with total full face mask as pt has large facial structure unable to fit XL full face mask. Pt tolerated BiPAP over night well. Will continue to monitor and assess.

## 2023-06-21 NOTE — PROGRESS NOTES
[facility]    Medicine Progress Note - Hospitalist Service    Date of Admission:  6/12/2023    Assessment & Plan   Soto Gibbs is a pleasant 83-year-old gentleman with chronic kidney disease, hypertension, peripheral neuropathy, previous ventral hernia repair with mesh, chronic respiratory failure on supplemental oxygen and COPD, who presented after a fall on 6/12/2023.  He reported gross hematuria for several months.  Has known right renal mass with previous biopsy which indicated benign mass and incidental finding of pulmonary nodules.     Admitted for gross hematuria, generalized weakness, fall, progression and chronic anemia likely secondary to continued gross hematuria. Patient developed shortness of breath and acute on chronic respiratory failure with hypoxia secondary to COPD and possible HF with pEF. Improved with respiratory cares, steroids and IV diuresis.  CT guided biopsy of pulmonary nodule performed on 6/14/23 and pathology consistent with metastatic RCC.      Developed hypertensive urgency, increased shortness of breath and atypical chest pain on 6/14.  Elevated D-dimer on 6/15/23.  Nuc. Med. VQ scan -> low probability for PE.  Transitioned to oral diuresis on 6/16/23.       Oncology following.  Soto has been declining TCU placement.  Adjusted opioid analgesia on 6/19/23 for increased somnolence (6/19).  Will need follow up with [facility] [place], operated by [facility] and [facility] for suspected renal cancer, with lung metastasis.   Multiple pain complaints.      Palliative care has been following, as well.    1. Oversedation secondary to opioid analgesia on 6/19.  Acute on chronic respiratory failure with hypoxia and hypercapnia  Respiratory acidosis, acute.  - discontinued all opioid analgesia.   Avoid sedating medications: discontinued lorazepam, and Percocet.   Decreased gabapentin to 100 mg po BID  Order heat and ice therapy, topical lidocaine ointment and diclofenac  topical as needed for joint and back pain.   Order scheduled Tylenol 975 mg po TID.   Opioid reversal protocol, q15 min vital signs and RASS assessment.   BIPAP 12/5 ordered. RCAT to follow.     Appears to be more alert this am, just not willing to engage.      2. Jerking/twitching episodes today; unclear cause     Appears to have resolved on my assessment this am     Spoke with neuro on the phone - likely d/t significant CO2 retention earlier in his hospital stay     I don't see an urgent need for transfer to facility with in-person neuro consult available at this time unless he clinically worsens    Ok to transfer off of [facility] status today      3.  Metatstatic renal cell cancer  Oncology notes reviewed (6/13/23)   Urology consulted (6/13/23) and deferred to ONCOLOGY treatment recs  Recommending palliative care approach even prior to the results of the CT guided lung biopsy (6/14/23)    Palliative care family meeting set up for later today  Pt declining TCU and has been considering hospice     4. Chronic respiratory failure with hypoxia  COPD  Elevated d-dimer  CT chest severe emphysema, bilateral pulmonary nodules.  Lower extremity US negative for DVT.   NM VQ scan low probability for PE.   Continue home inhalers and supplemental oxygen at 2L/min  Completed prednisone taper.       5. Acute kidney injury on Chronic kidney disease stage 3b.   Mild hyperkalemia  Creatinine baseline 2.0  Potassium 5.2->4.6->5.2->4.8.  Urine protein creatinine ratio still pending.   Albuminuria on UA.   Hold lisinopril.  Hold furosemide.   NO NSAIDS.  Appreciate Nephrology recommendations - they are continuing to follow  May or may not be a short-term dialysis candidate, per notes     6. Gross hematuria  Chronic normocytic anemia  Anemia of chronic disease  Hemoglobin 9.0 g/dL stable.   Iron studies iron sat low at 40, ferritin normal at 245, TIBC low 208 reticulocyte count, iron sat index normal at 19. Vitamin b12 level normal at 454.      Currently receiving IV venofer daily for 5 doses    CBC daily.   Transfuse for hemoglobin < 7 g/dL.      7. Abnormal UA  No urinary tract infection.  Discontinue ceftriaxone as culture grew mixture of urogenital bhavya < 10,000.     8. Heart failure with preserved ejection fraction.   113 kg to 107 kg on last weight on 6/16.  BNP elevated 545 on admission.  Transthoracic echocardiogram LVEF hyperdynamic 65-70%, no WMA.  Holding furosemide 20 mg po daily, for now     9. Hypertensive urgency - resolved.   Resistant hypertension  Home PTA medications: atenolol 25 mg daily,   Holding Lisinopril 20 mg daily for ELISABETH, hyperkalemia.   Continue Amlodipine 10 mg po daily.  Continue hydralazine 50 mg po TID with hold parameters.   Fair control.     10. Abdominal pain  Ventral abdominal hernia  History of hernia repair with mesh.   General surgery was consulted, no surgical intervention recommended at this time.   Lactic acid normal.  Discontinue oral/IV hydromorphone  Percocet 5/325mg 1 to 2 tabs q4h prn.  Outpatient follow up with hernia specialist in future.      11. GERD with possible esophagitis.   pantoprazole 40 mg po BID  sucralfate QID AC HS     12. Constipation   Bisacodyl suppository 10 mg rectal daily prn.   Miralax 17 g po BID.  Senna docusate 2 tabs BID  Milk of mag daily prn.     13. Chest pain atypical.   Atypical for cardiac pain, palpation reproduces pain.   Optimize pain management.  EKG, CXR, troponin I unremarkable.   Believe most of pain is associated with ventral hernia     14. Fall initial encounter  Degenerative disc disease lumbar spine.   Coccyx pain  CT head negative for acute pathology.  Xray thoracic and lumbar spine with previous posterior fusion L4-L5 and no complication of hardware, no acute fracture.  Multilevel degenerative disc disease noted.   PT and OT evaluation  Continue pain control as above.     15. Hyperlipidemia  Continue home statin.     16. Peripheral neuropathy  gabapentin 300  "mg BID prn.   Sleep apnea  Non compliant with CPAP       Medical Decision Making     51 MINUTES SPENT BY ME on the date of service doing chart review, history, exam, documentation & further activities per the note.        PPE Worn:  Mask, gloves     Diet: Renal Diet (non-dialysis)  Snacks/Supplements Adult: Nepro Oral Supplement; With Meals    DVT Prophylaxis: Pneumatic Compression Devices  Hill Catheter: Not present  Lines: None     Cardiac Monitoring: None  Code Status: No CPR- Do NOT Intubate      Clinically Significant Risk Factors        # Hyperkalemia: Highest K = 5.7 mmol/L in last 2 days, will monitor as appropriate          # Acute Kidney Injury, unspecified: based on a >150% or 0.3 mg/dL increase in last creatinine compared to past 90 day average, will monitor renal function         # Overweight: Estimated body mass index is 29.37 kg/m  as calculated from the following:    Height as of this encounter: 1.905 m (6' 3\").    Weight as of this encounter: 106.6 kg (235 lb 0.2 oz).           Disposition Plan      Expected Discharge Date: 06/22/2023      Destination: home with family  Discharge Comments: Rt lung bx  Anticiapte home with HC  Potential need for Palliative  needing liver biopsy with IR? r/t new lesions  neph consult  Hospice?          Margarita Cates, DO  Hospitalist Service  [facility]  Securely message with Denise (more info)  Text page via [employer] Paging/Directory   ______________________________________________________________________    Interval History     \"I am ok\". \"I don't want to talk\"  No new concerns per nursing  Palliative care meeting planned for later today    Physical Exam   Vital Signs: Temp: 98.4  F (36.9  C) Temp src: Axillary BP: (!) 145/64 (RN notified) Pulse: 67   Resp: 20 SpO2: 97 % O2 Device: Nasal cannula Oxygen Delivery: 2 LPM  Weight: 235 lbs .17 oz    GEN:  Sleeping on right side in bed, easily arousable  Speech is clear and " appropriate  HEENT:  Normocephalic/atraumatic, no scleral icterus, no nasal discharge, mouth and membranes moist  CV:  Regular rate and rhythm, no loud murmur or JVD.  S1 + S2 noted, no S3 or S4.  LUNGS:  Clear to auscultation ant/lat bilaterally without clear rales/rhonchi/wheezing/retraction- although exam somewhat limited d/t pt not being cooperative with exam.  Symmetric chest rise on inhalation noted.  ABD:  Active bowel sounds, soft, non-tender to light palpation throuhgout, mildly distended throughout.  No clear rebound/guarding/rigidity.  EXT:  1+ pretitibal edema bilaterally, mild pitting hand edema bilaterally.  No cyanosis.    PSYCH:  Depressed affect; somewhat withdrawn.    Medications     - MEDICATION INSTRUCTIONS -       ACE/ARB/ARNI NOT PRESCRIBED       sodium chloride 0.9%         acetaminophen  975 mg Oral Q8H     amLODIPine  5 mg Oral Daily     atenolol  25 mg Oral QAM     cefTRIAXone  2 g Intravenous Q24H     diclofenac  4 g Topical 4x Daily     [Held by provider] furosemide  20 mg Oral Daily     hydrALAZINE  50 mg Oral Q8H RACHID     iron sucrose  200 mg Intravenous Q24H     pantoprazole  40 mg Oral BID AC     polyethylene glycol  17 g Oral BID     senna-docusate  1 tablet Oral BID     simvastatin  20 mg Oral QPM     sodium chloride (PF)  3 mL Intracatheter Q8H     sucralfate  1 g Oral 4x Daily AC & HS       Data     Labs and Imaging results below reviewed today.  Recent Labs   Lab 06/20/23  1535 06/20/23  0550 06/19/23  1211 06/18/23  1010   WBC  --  8.8 9.4 9.4   HGB 9.1* 9.0* 9.6* 9.5*   HCT  --  31.5* 33.8* 32.2*   MCV  --  100 101* 97   PLT  --  152 181 162     Recent Labs   Lab 06/20/23  0550 06/19/23  1634 06/19/23  1211    141 140   POTASSIUM 5.0 5.3* 5.7*   CHLORIDE 100 98 98   CO2 34* 34* 32*   ANIONGAP 8 9 10   * 143* 152*   BUN 71* 71* 69*   CR 3.11* 3.09* 3.19*   GFRESTIMATED 19* 19* 19*   LEENA 9.1 9.2 9.3     7-Day Micro Results     Collected Updated Procedure Result Status       06/16/2023 1236 06/18/2023 1035 Urine Culture [[id number]]   Urine, Clean Catch    Final result Component Value   Culture <10,000 CFU/mL Mixture of urogenital bhavya                     No results found for this or any previous visit (from the past 24 hour(s)).

## 2023-06-21 NOTE — PROGRESS NOTES
PALLIATIVE CARE SOCIAL WORK Progress Note   Location: [facility]    Brief covisit with Pt and dtr Flakita and friend Josué. This was initial visit for PCSW. Pt was eating lunch, did not want to be disturbed or engage in conversation today. Dtr Flakita is working with her sister and with care management on planning for discharge with home hospice.      Plan: PCSW is available if further needs arise.       Anyi Oscar [facility]  [facility], Palliative Care  Securely message with the [employer] Web Console (learn more here) or  Text page via [facility] Paging/Directory

## 2023-06-21 NOTE — PROGRESS NOTES
RENAL PROGRESS NOTE    ASSESSMENT & PLAN:   Non-Oliguric ELISABETH: Baseline creatinine around 2.0, serially rising since 6/14/2023.  This is likely an ATN injury secondary to prerenal hemodynamics with relative hypotension after treatment of hypertensive urgency, poor oral intakes, and attempts at diuresis for CHF.  Nephrology was consulted 6/18/2023, furosemide and lisinopril on hold, S/P gentle hydration with IVF.  --Did have imaging this admit with no obstructive physiology, renal mass is increasing.  --UA difficult to interpret in the setting of gross hematuria secondary to RCC.  Urology was consulted this admit.   --Would be a poor long-term dialysis candidate if it came down to it.  Depending on oncology and urology recommendations/ treatment potential of his metastatic disease, he may be a short-term dialysis patient.  Appreciate palliative care consultation.  Patient refusing TCU and rehab, plan is home with hospice care.  Should patient opt out of hospice care, would certainly offer nephrology follow-up outpatient per patient/ family request.  Patient can call to schedule nephrology follow-up if they so choose.  Orders entered in discharge navigator.     CKD IIIb: baseline Cr ~2.0 since 2020 etiology likely 2/2 hypertensive nephrosclerosis, age related changes, R renal mass/RCC and diabetic nephropathy can not be ruled out (A1C ~7.0 since 2014). Renal imaging with no hydro/+R renal mass. + proteinuria on UA since 2020.  UPCR with around 1.9 g on spot check.     Anemia: Ongoing losses with gross hematuria secondary to RCC.  Hemoglobin in the 9 range.  Iron deficient.  We will give some parenteral iron in the setting of ongoing bleed and iron deficiency.  B12 and folate were replete.  Certainly can see anemia of chronic disease at this eGFR, will not check erythropoietin level because we would not give MAYA in the setting of malignancy either way.  Transfusions per hospital service hemoglobin <7    HTN: on  atenolol 25, amlodipine 5, hydralazine 50mg TID with hold parameters. ACE and lasix on hold with ELISABETH.  Placed hold parameters on antihypertensive medications.    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  S/p Lokelma 6/19/2023.  Potassium has normalized at 5.0.  Placed on renal diet.     Metastatic RCC: Urology consulted this admit.  Apparently patient refusing nephrectomy in past.  Has follow-up with Dr. Erickson 7/2023 for further management options.     Gross hematuria: likely 2/2 RCC.  Monitor PVRs/bladder scans. No concerns for clot retention per urology. Urine remains blood tinged. No urgent urological procedures indicated per Urology note. Maintain follow up appt with Dr. Erickson 7/20/23 to further discuss options for renal mass management     Pulmonary nodules: suspicion for metastatic RCC. S/P CT guided Bx 6/14/23. ONC following.     Chronic Respiratory failure with hypoxia/COPD: elevated D-dimer. + chest CT/severe emphysema/BL pulmonary Nodules. Lower Extremity US Neg for DVT. NM VQ low probability for PE. Has inhalers. On prednisone taper.      HLD: on statin     CPAP: External compliance with CPAP     DM:  Insulin per hospital service     Peripheral Neuropathy: on low-dose gabapentin as needed     HFpEF: . Echo 65-70%, no WMA.  S/p diuresis with IV furosemide, creatinine rising with ATN injury and now diuretics on hold.    SUBJECTIVE:    Patient is pretty sleepy today  Plan is home with hospice care  As needed outpatient nephrology follow-up if patient or family so chooses    OBJECTIVE:  Physical Exam   Temp: 98.4  F (36.9  C) Temp src: Oral BP: (!) 151/67 Pulse: 66   Resp: 22 SpO2: 98 % O2 Device: Nasal cannula Oxygen Delivery: 2 LPM  Vitals:    06/19/23 0622 06/20/23 0334 06/21/23 0414   Weight: 108.2 kg (238 lb 8.6 oz) 106.6 kg (235 lb 0.2 oz) 106.6 kg (235 lb 0.2 oz)     Vital Signs with Ranges  Temp:  [98  F (36.7  C)-98.4  F (36.9  C)] 98.4  F (36.9   C)  Pulse:  [60-78] 66  Resp:  [20-22] 22  BP: (140-183)/(64-78) 151/67  FiO2 (%):  [30 %] 30 %  SpO2:  [96 %-99 %] 98 %  I/O last 3 completed shifts:  In: 884.17 [P.O.:600; I.V.:284.17]  Out: 1400 [Urine:1400]    Patient Vitals for the past 72 hrs:   Weight   06/21/23 0414 106.6 kg (235 lb 0.2 oz)   06/20/23 0334 106.6 kg (235 lb 0.2 oz)   06/19/23 0622 108.2 kg (238 lb 8.6 oz)       Intake/Output Summary (Last 24 hours) at 6/19/2023 1344  Last data filed at 6/19/2023 0622  Gross per 24 hour   Intake 924 ml   Output 600 ml   Net 324 ml       PHYSICAL EXAM:  General: Somnolent, does not answer questions for me.  HENT: Supple, Non-tender, no obvious JVD  Eyes: No scleral icterus  Cardiovascular: RRR, no rub, gallop, or murmur.   Extremities: Trace lower extremity edema  Respiratory: A bit coarse, 2 LNC  Gastrointestinal: Soft, non-tender, non-distended  Musculoskeletal: Grossly normal   Integumentary: Warm, dry, no rash  Neurologic: Non focal   Psychiatric: Somnolent  : External male catheter.  Blood-tinged UOP.    LABORATORY STUDIES:     Recent Labs   Lab 06/20/23  1535 06/20/23  0550 06/19/23  1211 06/18/23  1010 06/17/23  1041 06/15/23  0053   WBC  --  8.8 9.4 9.4 10.6 7.4   RBC  --  3.14* 3.36* 3.31* 3.37* 3.09*   HGB 9.1* 9.0* 9.6* 9.5* 9.7* 9.0*   HCT  --  31.5* 33.8* 32.2* 32.5* 29.2*   PLT  --  152 181 162 199 182       Basic Metabolic Panel:  Recent Labs   Lab 06/20/23  0550 06/19/23  1634 06/19/23  1211 06/18/23  1010 06/17/23  1041 06/16/23  0902    141 140 142 143 140   POTASSIUM 5.0 5.3* 5.7* 4.8 4.9 5.4*  5.4*   CHLORIDE 100 98 98 98 97* 100   CO2 34* 34* 32* 35* 37* 29   BUN 71* 71* 69* 66* 58* 50*   CR 3.11* 3.09* 3.19* 2.80* 2.76* 2.52*   * 143* 152* 131* 151* 144*   LEENA 9.1 9.2 9.3 9.5 10.1 9.6       INRNo lab results found in last 7 days.     Recent Labs   Lab Test 06/20/23  1535 06/20/23  0550 06/19/23  1211 09/07/22  0449 09/06/22  0401   INR  --   --   --   --  1.10   WBC  --   8.8 9.4   < > 8.2   HGB 9.1* 9.0* 9.6*   < > 10.8*   PLT  --  152 181   < > 280    < > = values in this interval not displayed.       Personally reviewed current labs    This note was dictated using voice recognition     Arjun Garcia PA-C  [facility]  [phone]

## 2023-06-21 NOTE — PROGRESS NOTES
Physical Therapy Discharge Summary    Reason for therapy discharge:    Discharged to home with hospice is current plan    Progress towards therapy goal(s). See goals on Care Plan in Epic electronic health record for goal details.  Goals not met.  Barriers to achieving goals:   limited tolerance for therapy.    Therapy recommendation(s):    No further therapy is recommended.  Patient is declining TCU, no further skilled PT needs

## 2023-06-21 NOTE — PLAN OF CARE
Problem: Pain Acute (Oncology Care)  Goal: Optimal Pain Control  Outcome: Progressing    Problem: Muscle Strength Impairment  Goal: Improved Muscle Strength  Outcome: Progressing    Patient Aox4, calm, cooperative. C/o back pain and abd cramping controlled with johann/prn pain medication repositioning and rest. Q2 turning. Patient able to assist in bed turns. Tolerating renal diet. Blood pressure elevate (meds given) otherwise vitally stable on 2L oxygen.

## 2023-06-22 NOTE — PROGRESS NOTES
"RT consult for home CPAP not working. Pt needs to contact home care company. I did set it up and look at it, but CPAP states \"motor life has \". Attempted x2 to call home care company with no answer. Family will need to keep trying them.     Set up our hospital CPAP in his room for him tonight for the time being.   RT will continue to monitor as needed    Marlene Centeno, RT    "

## 2023-06-22 NOTE — PLAN OF CARE
Problem: Risk for Delirium  Goal: Improved Sleep  Outcome: Progressing     Problem: Coping Ineffective (Oncology Care)  Goal: Effective Coping  Outcome: Progressing     Problem: Pain Acute (Oncology Care)  Goal: Optimal Pain Control  Outcome: Progressing     Pt A&Ox4, call light within reach. Pt reporting pain 6/10-8/10 this shift, PRN percocet given with stated relief, pt reporting pain later in the shift, scheduled pain meds offered, but pt refused. Pt assist of 1-2 for turns in bed, occasionally pt able to roll himself to the opposite side independently. Pt on 2LNC during the day, BiPAP at HS, pt denies SOB. Male external catheter in place. Pt sleeping between cares. No acute changes noted over night.    Ana Holm, RN  [phone]

## 2023-06-22 NOTE — PROGRESS NOTES
[facility]    Medicine Progress Note - Hospitalist Service    Date of Admission:  6/12/2023    Assessment & Plan   Soto Gibbs is a pleasant 83-year-old gentleman with chronic kidney disease, hypertension, peripheral neuropathy, previous ventral hernia repair with mesh, chronic respiratory failure on supplemental oxygen and COPD, who presented after a fall on 6/12/2023.  He reported gross hematuria for several months.  Has known right renal mass with previous biopsy which indicated benign mass and incidental finding of pulmonary nodules.     Admitted for gross hematuria, generalized weakness, fall, progression and chronic anemia likely secondary to continued gross hematuria. Patient developed shortness of breath and acute on chronic respiratory failure with hypoxia secondary to COPD and possible HF with pEF. Improved with respiratory cares, steroids and IV diuresis.  CT guided biopsy of pulmonary nodule performed on 6/14/23 and pathology consistent with metastatic RCC.      Developed hypertensive urgency, increased shortness of breath and atypical chest pain on 6/14.  Elevated D-dimer on 6/15/23.  Nuc. Med. VQ scan -> low probability for PE.  Transitioned to oral diuresis on 6/16/23.       Oncology following.  Soto has been declining TCU placement.  Adjusted opioid analgesia on 6/19/23 for increased somnolence (6/19).  Will need follow up with [facility] for suspected renal cancer, with lung metastasis.   Multiple pain complaints.      Palliative care has been following, as well.  Pt and family have decided on home hospice.    1. Oversedation secondary to opioid analgesia on 6/19.  Acute on chronic respiratory failure with hypoxia and hypercapnia  Respiratory acidosis, acute.  - discontinued all opioid analgesia.   Avoid sedating medications: discontinued lorazepam, and Percocet.   Decreased gabapentin and weaned off  Order heat and ice therapy,  topical lidocaine ointment and diclofenac topical as needed for joint and back pain.     Opioid reversal protocol, q15 min vital signs and RASS assessment.   BIPAP 12/5 ordered. RCAT to follow.     More alert and interactive today     2. Jerking/twitching episodes -resolved      Likely d/t acute respiratory acidosis d/t CO2 retention     3.  Metatstatic renal cell cancer  Oncology notes reviewed (6/13/23)   Urology consulted (6/13/23) and deferred to ONCOLOGY treatment recs  Recommending palliative care approach even prior to the results of the CT guided lung biopsy (6/14/23)    Palliative care following  Family has decided on home hospice     4. Chronic respiratory failure with hypoxia  COPD  Elevated d-dimer  CT chest severe emphysema, bilateral pulmonary nodules.  Lower extremity US negative for DVT.   NM VQ scan low probability for PE.   Continue home inhalers and supplemental oxygen at 2L/min  Completed prednisone taper.       5. Acute kidney injury on Chronic kidney disease stage 3b.   Mild hyperkalemia - resolved  Mild hyponatremia - noted today d/t decreased po intake  Creatinine baseline 2.0  Nephrology has been following   Received IV diureisis was transitioned to po lasix  Creat improved this am but nephrology recommending holding further scheduled lasix, for now, d/t hypernatremia    6. Gross hematuria  Chronic normocytic anemia  Anemia of chronic disease  Hemoglobin 9.0 g/dL stable.   Iron studies iron sat low at 40, ferritin normal at 245, TIBC low 208 reticulocyte count, iron sat index normal at 19. Vitamin b12 level normal at 454.     Has received IV venofer     7. Abnormal UA  No urinary tract infection.  Discontinue ceftriaxone as culture grew mixture of urogenital bhavya < 10,000.     8. Heart failure with preserved ejection fraction.   113 kg to 107 kg on last weight on 6/16.  BNP elevated 545 on admission.  Transthoracic echocardiogram LVEF hyperdynamic 65-70%, no WMA.  Holding furosemide 20 mg  po daily, for now     9. Hypertensive urgency - resolved.   Resistant hypertension    Holding Lisinopril 20 mg daily for ELISABETH, hyperkalemia.     Continue atenolol 25mg po daily  Increase Amlodipine 10 mg po daily today.  Increase hydralazine 50 mg po qid with hold parameters today    Will need to review BP meds in the am for d/c       10. Abdominal pain  Ventral abdominal hernia  History of hernia repair with mesh.   General surgery was consulted, no surgical intervention recommended at this time.   Lactic acid normal.    Pt notes intolerance to tylenol  Will hold scheduled TYLENOL  Discontinue percocet prn  Restart po dilaudid    11. GERD with possible esophagitis.   pantoprazole 40 mg po daily  sucralfate QID AC HS     12. Constipation   Bisacodyl suppository 10 mg rectal daily prn.   Miralax 17 g po BID.  Senna docusate 2 tabs BID  Milk of mag daily prn.     13. Chest pain atypical.   Atypical for cardiac pain, palpation reproduces pain.   Optimize pain management.  EKG, CXR, troponin I unremarkable.   Believe most of pain is associated with ventral hernia     14. Fall initial encounter  Degenerative disc disease lumbar spine.   Coccyx pain  CT head negative for acute pathology.  Xray thoracic and lumbar spine with previous posterior fusion L4-L5 and no complication of hardware, no acute fracture.  Multilevel degenerative disc disease noted.   PT and OT evaluation  Continue pain control as above.     15. Hyperlipidemia  Continue home statin.     16. Peripheral neuropathy  gabapentin 300 mg BID prn.     17. Sleep apnea  States that he needs a new CPAP machine but has not been able to get one  I have asked family to bring in his home machine for them to check  ? New rx for hospice - need to determine settings         Medical Decision Making      Addendum - 1350    Returned to room and spoke with pt and family at length (daughter present and daughter on the phone).  All questions answered including medication review  "(BP and pain meds), will PT come to the house, CPAP and general hospice/discharge questions. Pt more awake and engaged this afternoon.      51 MINUTES SPENT BY ME on the date of service doing chart review, history, exam, documentation & further activities per the note.        PPE Worn:  Mask, gloves     Diet: Renal Diet (non-dialysis)  Snacks/Supplements Adult: Nepro Oral Supplement; With Meals    DVT Prophylaxis: Pneumatic Compression Devices  Hill Catheter: Not present  Lines: None     Cardiac Monitoring: None  Code Status: No CPR- Do NOT Intubate      Clinically Significant Risk Factors         # Hypernatremia: Highest Na = 146 mmol/L in last 2 days, will monitor as appropriate      # Hypoalbuminemia: Lowest albumin = 3.2 g/dL at 6/22/2023  5:18 AM, will monitor as appropriate            # Overweight: Estimated body mass index is 29.37 kg/m  as calculated from the following:    Height as of this encounter: 1.905 m (6' 3\").    Weight as of this encounter: 106.6 kg (235 lb 0.2 oz).           Disposition Plan     home with hospice 6/23/23    Margarita Cates DO  Hospitalist Service  [facility]  Securely message with [employer] (more info)  Text page via [employer] Paging/Directory   ______________________________________________________________________    Interval History     \"I am ok\". \"I don't want to talk\"  No new concerns per nursing  Palliative care meeting planned for later today    Physical Exam   Vital Signs: Temp: 98.3  F (36.8  C) Temp src: Oral BP: (!) 174/76 Pulse: 70   Resp: 26 SpO2: 98 % O2 Device: Nasal cannula Oxygen Delivery: 2 LPM  Weight: 235 lbs .17 oz    GEN:  Sleeping on right side in bed, easily arousable  Speech is clear and appropriate  HEENT:  Normocephalic/atraumatic, no scleral icterus, no nasal discharge, mouth and membranes moist  CV:  Regular rate and rhythm, no loud murmur or JVD.  S1 + S2 noted, no S3 or S4.  LUNGS:  Clear to auscultation ant/lat " bilaterally without clear rales/rhonchi/wheezing/retraction- although exam somewhat limited d/t pt not being cooperative with exam.  Symmetric chest rise on inhalation noted.  ABD:  Active bowel sounds, soft, non-tender to light palpation throuhgout, mildly distended throughout.  No clear rebound/guarding/rigidity.  EXT:  1+ pretitibal edema bilaterally, mild pitting hand edema bilaterally.  No cyanosis.    PSYCH:  Depressed affect; somewhat withdrawn.    Medications     - MEDICATION INSTRUCTIONS -       ACE/ARB/ARNI NOT PRESCRIBED       sodium chloride 0.9%         acetaminophen  975 mg Oral Q8H     amLODIPine  5 mg Oral Daily     atenolol  25 mg Oral QAM     diclofenac  4 g Topical 4x Daily     [Held by provider] furosemide  20 mg Oral Daily     hydrALAZINE  50 mg Oral Q8H RACHID     iron sucrose  200 mg Intravenous Q24H     pantoprazole  40 mg Oral BID AC     polyethylene glycol  17 g Oral BID     senna-docusate  1 tablet Oral BID     simvastatin  20 mg Oral QPM     sodium chloride (PF)  3 mL Intracatheter Q8H     sucralfate  1 g Oral 4x Daily AC & HS       Data     Labs and Imaging results below reviewed today.  Recent Labs   Lab 06/20/23  1535 06/20/23  0550 06/19/23  1211 06/18/23  1010   WBC  --  8.8 9.4 9.4   HGB 9.1* 9.0* 9.6* 9.5*   HCT  --  31.5* 33.8* 32.2*   MCV  --  100 101* 97   PLT  --  152 181 162     Recent Labs   Lab 06/22/23  0518 06/20/23  0550 06/19/23  1634   * 142 141   POTASSIUM 4.3 5.0 5.3*   CHLORIDE 102 100 98   CO2 36* 34* 34*   ANIONGAP 8 8 9    146* 143*   BUN 47* 71* 71*   CR 2.27* 3.11* 3.09*   GFRESTIMATED 28* 19* 19*   LEENA 9.4 9.1 9.2     7-Day Micro Results     Collected Updated Procedure Result Status      06/16/2023 1236 06/18/2023 1035 Urine Culture [[id number]]   Urine, Clean Catch    Final result Component Value   Culture <10,000 CFU/mL Mixture of urogenital bhavya                     No results found for this or any previous visit (from the past 24 hour(s)).

## 2023-06-22 NOTE — PLAN OF CARE
Problem: Gas Exchange Impaired  Goal: Optimal Gas Exchange  Outcome: Progressing  Intervention: Optimize Oxygenation and Ventilation  Recent Flowsheet Documentation  Taken 6/22/2023 0845 by Ebonie Morales, RN  Head of Bed (HOB) Positioning: HOB at 20-30 degrees   Problem: Pain Acute (Oncology Care)  Goal: Optimal Pain Control  Intervention: Develop Pain Management Plan  Recent Flowsheet Documentation  Taken 6/22/2023 0943 by Ebonie Morales, RN  Pain Management Interventions:    repositioned    rest    quiet environment facilitated  Taken 6/22/2023 0845 by Ebonie Morales, RN  Pain Management Interventions:    medication (see MAR)    quiet environment facilitated    rest      A/O x 4. Pt on 2L O2 during the day, requires BiPap at night. Primofit in place; output red-colored. Pt c/o R flank pain and low back pain managed with prn medications as well as topical Lidocaine patches. R PIV SL, dressing changed due to old drainage. Renal diet in place, poor appetite per pt, refused to eat breakfast and lunch. Pt drinking Nepro protein supplement as able. Plan to discharge home on hospice tomorrow.

## 2023-06-22 NOTE — PROGRESS NOTES
Brief renal update:    Was not formally seen today.  Patient going home with home hospice care.  Of note, renal function is showing some improvements.  He has good urine output  He is trending a bit hypernatremic likely with higher urine output, and low oral intakes.  Enc po  Would continue to hold Lasix d/t the above   Further management per primary team.  Please reconsult us if goals of care change or if we can be of any assistance    Arjun Garcia PA-C  [facility]   [phone]

## 2023-06-22 NOTE — PROGRESS NOTES
"PALLIATIVE CARE CHART CHECK    Soto Gibbs is a 83 year old male with a past medical history of FORTUNATO, COPD on chronic O2, CKD 3, HTN, neuropathy, HLD, who presented on 6/12/2023 after sustaining a fall, abdominal pain, ELISABETH, enlarging right renal mass initially diagnosed in 2022.  Per urology's note patient had an enlarging right renal mass initially 4.9 cm on 3/2/2022 and has continued to increase in size.  8/25 2022 patient underwent right renal mass biopsy at [facility] pathology revealed renal oncocytic tumor, histologic grade 2.  10/2022 Patient followed up with Dr. Myers in the outpatient clinic. Per daughter Vangie, surgical intervention was not offered. She wishes the medical record be clear that patient did not \"declined surgery\" it was not offered.  Patient has subsequently been following with Dr. Bains since 5/23/2023.  With disease progression and likely pulmonary mets from renal cell carcinoma (still awaiting final pathology results), treatment would be more palliative in nature per oncology.  6/12/2023 CT scan showed 8.3 x 8.2 x 6.0 cm mass in mid to upper right kidney and 2.4 cm right mid renal cyst  6/14/2023 CT-guided right lower lobe biopsy.  Pathology reveals renal cell carcinoma.  6/15/2023 chest CT shows emphysema, multiple bilateral pulmonary nodules.  6/16/23 VQ scan negative for PE.    PLAN: appreciate care management assistance with discharge planning and hospice connection for discharge.    Palliative care will sign off at this time as goals are established and symptoms managed.    DEAN Barry, CNS  [facility], Palliative Care  Securely message with the Vocera Web Console (learn more here) or  Text page via [facility] Paging/Directory         "

## 2023-06-22 NOTE — PROGRESS NOTES
"Care Management Follow Up    Length of Stay (days): 9    Expected Discharge Date: 06/22/2023     Concerns to be Addressed:    Discharge planning   Patient plan of care discussed at interdisciplinary rounds: Yes    Anticipated Discharge Disposition: Home, Hospice  Anticipated Discharge Services:  Hospice  Anticipated Discharge DME: Oxygen    Patient/family educated on Medicare website which has current facility and service quality ratings: yes  Education Provided on the Discharge Plan: Yes  Patient/Family in Agreement with the Plan: yes    Referrals Placed by CM/SW: Hospice  Private pay costs discussed: Not applicable    Additional Information:  Chart reviewed.  Confirmed with Dr. Cates that pt is medically ready for discharge home with hospice today.    8:38 AM  Daughter Flakita [phone] - Called and left a voicemail.  Yesterday, naomi Flakita and Vangie were undecided on which hospice agency they wanted.  They do want pt admitted to hospice on same day of discharge, for additional support.     8:45 AM   [facility] ([phone]) - Called asking for an update on [facility] availability to do an admit today.  Awaiting call back.    9:01 AM  [facility], Flakita RN ([phone]) - They have no availability for admissions until Monday or Tuesday 6/26-6/27.    9:44 AM   [facility] ([phone]) - Mackenzie in Care Transitions ([phone]) spoke to their manager.  They have no availability to admit patients in [place] areas the next few weeks.    9:58 AM  Update given to naomi Villafana.  They are agreeable to [facility] (first choice), [facility], [facility], [facility], [facility], [facility], [facility] - any hospice agency that has availability for same day admit.  Flakita stated, \"we can always transfer agencies later.\"    Discussed potential out of pocket cost of [facility] stretcher transport at time of discharge, Ledy verbalized understanding and " agreeable.    10:12 AM  [facility] ([phone]) - Referral sent. Per She, [facility] does not have availability for same day admit.    10:19 AM   [facility] ([phone] office) - Referral sent.  [facility] ([phone]) - Left voicemail.  Awaiting call back from [facility] to confirm their availability for same-day admit.     10:28 AM  [facility] ([phone]) - Referral sent.  Spoke to Barbie, who will review this patient.  Awaiting call back.    10:57 AM  [facility] (Graciela [phone]) - They are unable to admit after 15:00pm today.    11:00 AM  [facility] (Shea [phone]) - Ramirez states they can admit pt to their program today, even as late as 21:00pm.    11:07 AM  [facility] (Graciela [phone]) - They can send medical equipment to pt's home this evening between 14:00pm-18:00pm.  Would have to admit pt to hospice tomorrow morning.    11:13 AM  [facility] - The earliest wheelchair transport they can provide is 15:30-16:10pm today.  Update given to Graciela of [facility], Ramirez of [facility], and to melyssa Boggs.  Flakita to decide between the 2 different agencies; awaiting call back from Flakita.    11:50 AM  Melyssa Boggs wants to give patient choice between the 2 agencies.  She is arriving to the hospital to visit the patient.    12:10 PM  Per daughter Flakita, pt reported that he had some med changes today and doesn't feel ready to discharge today nor tomorrow.  Bedside nurse reports she explained medication changes to patient's daughter (pain medications, blood pressure medication).  Writer paged Dr. Cates to discuss with daughter Flakita and patient.  Per Dr. Gordon, anticipate pt will be medically ready for discharge tomorrow.    12:33 PM  [facility] stretcher transport secured for tomorrow 6/23 at 11:09-11:54AM, in anticipation of discharge to home with hospice tomorrow. Hospice agency TBD.    12:54 PM   Daughter Flakita and patient are  still undecided on which hospice agency they would like to admit to.  Pt states he does not feel ready to discharge home tomorrow.  Pt and daughter refuse to discuss discharge planning until they speak to doctor.  Update given to Dr. Cates via page.  CM unable to further coordinate discharge until pt and family choose a hospice agency.    1:15 PM   Melyssa Boggs states patient and family have decided to go with [facility].  Flakiat made aware of transport tomorrow at 11am, agreeable, states she has keys to the pt's home, or family will be present to receive pt at home. Confirmed pt's home address in Epic chart is correct ([address], [place] [address]). Informed Flakita that [facility] will contact her directly to arrange delivery of DME via hospice.    1:17 PM   Update given to Ashley at [facility].  Update given to Graciela at [facility], who will contact Flakita to further discuss delivery of hospice DME.  Awaiting confirmation of when DME will arrive to pt's home. CM will adjust transport time if needed.    2:23 PM  [facility] (Graciela [phone]) - Confirms they can send hospice DME to patient's home tomorrow morning between 09:00-12:00pm.    [facility] stretcher transport rescheduled for Fri 6/23 at 13:40-14:20pm, in anticipation of discharge to home with hospice ([facility]).  Update given to melyssa Boggs, and to Graciela and Lorna JENKINS at [facility].    2:27 PM  Received notification from Dr. Cates pt is reporting he needs a new CPAP machine.  [facility] states CPAP is considered a treatment and may not be covered under Hospice - depending on pt's hospice diagnosis.  Dr. Cates will consult RT, called pt's daughter Flakita confirming she will bring in pt's CPAP for RT to evaluate.    2:50 PM  Order for CPAP in place.  [facility] ([phone]) - Per RT, defer to Hospice.  CPAP would be rent to own for 13 months.  If a patient enrolls in hospice,  [facility] would not be able to bill for the CPAP machine; they would have to retrieve CPAP from the pt, and hospice would have to provide pt with a CPAP machine.    3:43 PM  [facility] states they can provide a CPAP machine for the patient.  They have availability to meet with pt tomorrow afternoon, can send hospice DME to the home in the morning as well.    3:50 PM   Met with pt and daughter Flakita at bedside.  Pt's CPAP in the room, awaiting RT eval later today.  Pt states the CPAP machine is completely dead.  Pt's CPAP was from [facility] [phone] in [place], [place].  Pt would like to switch to [facility] due to coverage of the CPAP machine.  Update given to Ramirez at [facility], and to staff at [facility] to forward to [place].    Anticipate discharge to home tomorrow 6/23/23 with [facility], [facility] stretcher transport.    PCS needed.    CM will continue to follow.    Darryl Chou RN

## 2023-06-23 NOTE — PLAN OF CARE
Patient questions answered and encouraged. EMS transport to provided. Patient discharged.

## 2023-06-23 NOTE — DISCHARGE SUMMARY
[facility] [place] Discharge Summary    Soto Gibbs MRN# [id number]   Age: 83 year old YOB: 1940     Date of Admission:  6/12/2023  Date of Discharge::  6/23/2023  Admitting Physician:  Gucci Hall DO  Discharge Physician:  Rafael Hernandez MD     Home clinic: NA          Admission Diagnoses:   Generalized muscle weakness [M62.81]  ELISABETH (acute kidney injury) (H) [N17.9]  Fall, initial encounter [W19.XXXA]  Symptomatic anemia [D64.9]            Discharge Diagnosis:   Patient Active Problem List   Diagnosis     Diabetes mellitus, type 2 (H)     Chronic kidney disease, stage 3a (H)     Generalized muscle weakness     Fall, initial encounter     Symptomatic anemia     ELISABETH (acute kidney injury) (H)               Procedures:   No other significant procedures performed during this admission          Medications Prior to Admission:     Medications Prior to Admission   Medication Sig Dispense Refill Last Dose     albuterol (PROVENTIL) 2.5 mg /3 mL (0.083 %) nebulizer solution [ALBUTEROL (PROVENTIL) 2.5 MG /3 ML (0.083 %) NEBULIZER SOLUTION] Take 3 mL (2.5 mg total) by nebulization every 4 (four) hours as needed for shortness of breath. 75 mL 0 More than a month     atenolol (TENORMIN) 25 MG tablet [ATENOLOL (TENORMIN) 25 MG TABLET] Take 1 tablet (25 mg total) by mouth every morning. 30 tablet 0 6/12/2023     OXYGEN-AIR DELIVERY SYSTEMS MISC [OXYGEN-AIR DELIVERY SYSTEMS MISC] Inhale 2 L/min continuous. Indications: copd, sleep apnea   6/12/2023     [DISCONTINUED] albuterol (PROVENTIL HFA;VENTOLIN HFA) 90 mcg/actuation inhaler [ALBUTEROL (PROVENTIL HFA;VENTOLIN HFA) 90 MCG/ACTUATION INHALER] Inhale 2 puffs every 6 (six) hours as needed for wheezing or shortness of breath. 8.5 g 0 Past Week     [DISCONTINUED] ferrous sulfate (FEROSUL) 325 (65 Fe) MG tablet Take 325 mg by mouth daily (with breakfast)   6/12/2023     [DISCONTINUED] gabapentin (NEURONTIN) 300 MG capsule Take 300 mg by mouth 2 times daily  as needed for neuropathic pain   6/12/2023 at am     [DISCONTINUED] hydrochlorothiazide (HYDRODIURIL) 25 MG tablet Take 25 mg by mouth daily   6/12/2023     [DISCONTINUED] lisinopril (PRINIVIL,ZESTRIL) 20 MG tablet [LISINOPRIL (PRINIVIL,ZESTRIL) 20 MG TABLET] Take 20 mg by mouth every morning.   6/12/2023     [DISCONTINUED] oxyCODONE-acetaminophen (PERCOCET) 5-325 mg per tablet [OXYCODONE-ACETAMINOPHEN (PERCOCET) 5-325 MG PER TABLET] Take 1 tablet by mouth every 4 (four) hours as needed for pain.   6/11/2023     [DISCONTINUED] simvastatin (ZOCOR) 20 MG tablet Take 20 mg by mouth every evening   6/11/2023     [DISCONTINUED] vitamin C with B complex (B COMPLEX-C) tablet Take 1 tablet by mouth daily   6/12/2023             Discharge Medications:     Current Discharge Medication List      START taking these medications    Details   amLODIPine (NORVASC) 10 MG tablet Take 1 tablet (10 mg) by mouth daily  Qty: 30 tablet, Refills: 0    Associated Diagnoses: Hypertension, unspecified type      diclofenac (VOLTAREN) 1 % topical gel Apply 4 g topically 4 times daily  Qty: 100 g, Refills: 0    Associated Diagnoses: Fall, initial encounter; Acute back pain, unspecified back location, unspecified back pain laterality      HYDROmorphone (DILAUDID) 2 MG tablet Take 1-2 tablets (2-4 mg) by mouth every 3 hours as needed for shortness of breath or moderate pain  Qty: 25 tablet, Refills: 0    Associated Diagnoses: Symptomatic anemia      ipratropium - albuterol 0.5 mg/2.5 mg/3 mL (DUONEB) 0.5-2.5 (3) MG/3ML neb solution Take 1 vial (3 mLs) by nebulization every 4 hours as needed for wheezing or shortness of breath  Qty: 90 mL, Refills: 0    Associated Diagnoses: Pulmonary emphysema, unspecified emphysema type (H)      Lidocaine (LIDOCARE) 4 % Patch Place 1-3 patches onto the skin every 24 hours To prevent lidocaine toxicity, patient should be patch free for 12 hrs daily.  Qty: 45 patch, Refills: 0    Associated Diagnoses: Acute back  pain, unspecified back location, unspecified back pain laterality      ondansetron (ZOFRAN ODT) 4 MG ODT tab Take 1 tablet (4 mg) by mouth every 6 hours as needed for nausea or vomiting  Qty: 20 tablet, Refills: 0    Associated Diagnoses: Nausea      pantoprazole (PROTONIX) 40 MG EC tablet Take 1 tablet (40 mg) by mouth every morning (before breakfast)  Qty: 30 tablet, Refills: 0    Associated Diagnoses: Gastroesophageal reflux disease, unspecified whether esophagitis present      polyethylene glycol (MIRALAX) 17 GM/Dose powder Take 17 g by mouth daily  Qty: 510 g, Refills: 0    Associated Diagnoses: Drug-induced constipation      senna-docusate (SENOKOT-S/PERICOLACE) 8.6-50 MG tablet Take 1 tablet by mouth 2 times daily as needed for constipation  Qty: 20 tablet, Refills: 0    Associated Diagnoses: Drug-induced constipation      sucralfate (CARAFATE) 1 GM/10ML suspension Take 10 mLs (1 g) by mouth 4 times daily (before meals and nightly)  Qty: 414 mL, Refills: 0    Associated Diagnoses: Gastroesophageal reflux disease, unspecified whether esophagitis present         CONTINUE these medications which have NOT CHANGED    Details   albuterol (PROVENTIL) 2.5 mg /3 mL (0.083 %) nebulizer solution [ALBUTEROL (PROVENTIL) 2.5 MG /3 ML (0.083 %) NEBULIZER SOLUTION] Take 3 mL (2.5 mg total) by nebulization every 4 (four) hours as needed for shortness of breath.  Qty: 75 mL, Refills: 0    Associated Diagnoses: Chronic obstructive pulmonary disease, unspecified COPD type (H); Acute respiratory failure with hypercapnia (H)      atenolol (TENORMIN) 25 MG tablet [ATENOLOL (TENORMIN) 25 MG TABLET] Take 1 tablet (25 mg total) by mouth every morning.  Qty: 30 tablet, Refills: 0    Associated Diagnoses: Essential hypertension      OXYGEN-AIR DELIVERY SYSTEMS MISC [OXYGEN-AIR DELIVERY SYSTEMS MISC] Inhale 2 L/min continuous. Indications: copd, sleep apnea         STOP taking these medications       albuterol (PROVENTIL HFA;VENTOLIN  HFA) 90 mcg/actuation inhaler Comments:   Reason for Stopping:         ferrous sulfate (FEROSUL) 325 (65 Fe) MG tablet Comments:   Reason for Stopping:         gabapentin (NEURONTIN) 300 MG capsule Comments:   Reason for Stopping:         hydrochlorothiazide (HYDRODIURIL) 25 MG tablet Comments:   Reason for Stopping:         lisinopril (PRINIVIL,ZESTRIL) 20 MG tablet Comments:   Reason for Stopping:         oxyCODONE-acetaminophen (PERCOCET) 5-325 mg per tablet Comments:   Reason for Stopping:         simvastatin (ZOCOR) 20 MG tablet Comments:   Reason for Stopping:         vitamin C with B complex (B COMPLEX-C) tablet Comments:   Reason for Stopping:                     Consultations:   Consultation during this admission received from hematology, nephrology, surgery and urology          Brief History of Illness:   Weakness and dyspnea          Hospital Course:   Soto Gibbs is a pleasant 83-year-old gentleman with chronic kidney disease, hypertension, peripheral neuropathy, previous ventral hernia repair with mesh, chronic respiratory failure on supplemental oxygen and COPD, who presented after a fall on 6/12/2023.  He reported gross hematuria for several months.  Has known right renal mass with previous biopsy which indicated benign mass and incidental finding of pulmonary nodules.     Admitted for gross hematuria, generalized weakness, fall, progression and chronic anemia likely secondary to continued gross hematuria. Patient developed shortness of breath and acute on chronic respiratory failure with hypoxia secondary to COPD and possible HF with pEF. Improved with respiratory cares, steroids and IV diuresis.  CT guided biopsy of pulmonary nodule performed on 6/14/23 and pathology consistent with metastatic RCC.       Developed hypertensive urgency, increased shortness of breath and atypical chest pain on 6/14.  Elevated D-dimer on 6/15/23.  Nuc. Med. VQ scan -> low probability for PE.  Transitioned to oral  diuresis on 6/16/23.       Oncology following.  has been declining TCU placement.  Adjusted opioid analgesia on 6/19/23 for increased somnolence (6/19).  Will need follow up with [facility] and [facility] for suspected renal cancer, with lung metastasis.   Multiple pain complaints.       Palliative care has been following, as well.  Pt and family have decided on home hospice.    All meds reviewed and resumed especially opiates for comfort, the rest of his list will need to be addressed by hospice in terms of weaning off unnecessary medications      Metatstatic renal cell cancer  Oncology notes reviewed (6/13/23)   Urology consulted (6/13/23) and deferred to ONCOLOGY treatment recs  Recommending palliative care approach even prior to the results of the CT guided lung biopsy (6/14/23)     Palliative care following  Family has decided on home hospice       Acute kidney injury on Chronic kidney disease stage 3b.   Mild hyperkalemia - resolved  Mild hyponatremia - noted today d/t decreased po intake  Creatinine baseline 2.0  Nephrology has been following   Received IV diureisis was transitioned to po lasix  Creat improved this am but nephrology recommending holding further scheduled lasix, for now, d/t hypernatremia     Gross hematuria  Chronic normocytic anemia  Anemia of chronic disease  Hemoglobin 9.0 g/dL stable.   Iron studies iron sat low at 40, ferritin normal at 245, TIBC low 208 reticulocyte count, iron sat index normal at 19. Vitamin b12 level normal at 454.      Has received IV venofer     Abnormal UA  No urinary tract infection.  Discontinue ceftriaxone as culture grew mixture of urogenital bhavya < 10,000.     Heart failure with preserved ejection fraction.   113 kg to 107 kg on last weight on 6/16.  BNP elevated 545 on admission.  Transthoracic echocardiogram LVEF hyperdynamic 65-70%, no WMA.  Holding furosemide 20 mg po daily, for now      Holding Lisinopril 20 mg daily for ELISABETH,  hyperkalemia.      Continue atenolol 25mg po daily  Increase Amlodipine 10 mg po daily today.  Increase hydralazine 50 mg po qid with hold parameters today      Abdominal pain  Ventral abdominal hernia  History of hernia repair with mesh.   General surgery was consulted, no surgical intervention recommended at this time.   Lactic acid normal.     Pt notes intolerance to tylenol  Will hold scheduled TYLENOL  Discontinue percocet prn  Restart po dilaudid                      Discharge Instructions and Follow-Up:   Discharge diet: Regular   Discharge activity: Activity as tolerated   Discharge follow-up: Follow up with primary care provider in 7 days           Discharge Disposition:   Discharged to home      Attestation:  I have reviewed today's vital signs, notes, medications, labs and imaging.  Amount of time performed on this discharge summary: 45 minutes.    Rafael Hernandez MD

## 2023-06-23 NOTE — PLAN OF CARE
Goal Outcome Evaluation:  DISCHARGE                       Plan to discharge between [id number]  ----------------------------------------------------------------------------  Discharged to: Home  Via: EMS  Accompanied by: Family  Discharge Instructions: diet, activity, medications, follow up appointments, when to call the MD, aftercare instructions, and what to watchout for (i.e. s/s of infection, increasing SOB, palpitations, chest pain,)  Prescriptions: To be filled by  [facility] per pt's request; medication list reviewed & sent with pt. Percocet is discontinued. Educated pt.  Follow Up Appointments: arranged; information given  Belongings: All sent with pt  IV: out  Telemetry: off  Pt exhibits understanding of above discharge instructions; all questions answered.    Discharge Paperwork: Signed, copied, and sent home with patient.       Plan of Care Reviewed With: patient, family    Megan Barber RN        Problem: Plan of Care - These are the overarching goals to be used throughout the patient stay.    Goal: Plan of Care Review  Description: The Plan of Care Review/Shift note should be completed every shift.  The Outcome Evaluation is a brief statement about your assessment that the patient is improving, declining, or no change.  This information will be displayed automatically on your shift note.  Outcome: Adequate for Care Transition  Flowsheets (Taken 6/23/2023 9301)  Plan of Care Reviewed With:   patient   family

## 2023-06-23 NOTE — PROGRESS NOTES
[facility]  Palliative Care Daily Progress Note       Recommendations & Counseling        GOALS OF CARE:     Patient declines transitional care and wishes to discharge home with hospice.     Care management consulted for hospice at discharge-plan is for patient to discharge today and will go home with support of family and [facility].      ADVANCE CARE PLANNING:    No health care directive on file. Per  informed consent policy next of kin should be involved if patient becomes unable.    Patient declines completion of HCD and POLST documents.    Discussed POLST and Honoring Choices documents with daughters 6/20/2023.  They have been given these documents previously and he has not been interested in completing them.    Code status:  DNR/I.     MEDICAL MANAGEMENT:   #Pain, kidney cancer related pain- right flank pain-improved    Acetaminophen (Tylenol), PRN    Anti-convulsants:  Gabapentin (Neurontin) -decrease dosing until patient more alert. Continue at 100 mg po BID    Heat prn    Patient previously on and weaned off steroids.  Did not seem to substantially assist with minimizing pain or discomfort.  Steroids can help with capsular stretch and inflammation.    Oxycodone possibly contributing to myoclonus though less likely than Morphine. Hospitalist to order medications at discharge.  Recommend Dilaudid 1 mg po every 1 hour PRN for pain.      #Delirium-improved  #Agitation-improved    Avoid benzodiazepines, antihistamines, anticholinergics if able.    Lights on and blinds open during the day.  Reorient frequently.    Lights & TV off during the night.  Promote normal circadian rhythm.    Limit sensory deprivation - utilize hearing aids, glasses, etc.    Frequently assess basic needs such as temperature, elimination, thirst/hunger, pain    Consider bedside attendant (if able) for additional safety    Other Narcan protocol for opiate-induced sedation     #General Weakness/Debility -  "severe, legs buckling beneath him with standing    Position for comfort    Patient will need hospital bed at home.      PSYCHOSOCIAL/SPIRITUAL:    Family and friends       Discussed with: Ashley with [facility] and RN Care Manager      Assessments             Soto Gibbs is a 83 year old male with a past medical history of FORTUNATO, COPD on chronic O2, CKD 3, HTN, neuropathy, HLD, who presented on 6/12/2023 after sustaining a fall, abdominal pain, ELISABETH, enlarging right renal mass initially diagnosed in 2022.  Per urology's note patient had an enlarging right renal mass initially 4.9 cm on 3/2/2022 and has continued to increase in size.  8/25 2022 patient underwent right renal mass biopsy at [facility] pathology revealed renal oncocytic tumor, histologic grade 2.  10/2022 Patient followed up with Dr. Myers in the outpatient clinic. Per daughter Vangie, surgical intervention was not offered. She wishes the medical record be clear that patient did not \"declined surgery\" it was not offered.  Patient has subsequently been following with Dr. Bains since 5/23/2023.  With disease progression and likely pulmonary mets from renal cell carcinoma (still awaiting final pathology results), treatment would be more palliative in nature per oncology.  6/12/2023 CT scan showed 8.3 x 8.2 x 6.0 cm mass in mid to upper right kidney and 2.4 cm right mid renal cyst  6/14/2023 CT-guided right lower lobe biopsy.  Pathology reveals renal cell carcinoma.  6/15/2023 chest CT shows emphysema, multiple bilateral pulmonary nodules.  6/16/23 VQ scan negative for PE.          Today, the patient was seen for:  Discharge planning, support for patient and family              Interval History:     Chart review/discussion with unit or clinical team members:   Palliative care signed off on 6/22.  Plan is to discharge to home with hospice and family support.  Care management and [facility] are working on discharge planning.  CPAP coverage " "under hospice has been explored and may be covered by [facility].     Per patient or family/caregivers today:  Met with patient and 2 daughters, as daughter Vangie left message requesting call back from palliative care.   Patient states he is ready to discharge to home with hospice.  Hoping it will happen today.  \"I'm leaving even if that means I have to walk out of here.\"    Daughters expressed frustration about coordination of discharge plan.  Their understanding was that equipment would be delivered to home between 9-12 today and patient could discharge later today.  Equipment was not delivered.  They just met with [facility] and plan to sign on, hoping he can discharge today.   I explained the role of palliative care and encouraged them to reach out with care management and [facility] re: further questions about discharge plan.      Key Palliative Symptoms:  # Pain severity the last 12 hours: low  # Dyspnea severity the last 12 hours: low  # Nausea severity the last 12 hours: none  # Anxiety severity the last 12 hours: none             Review of Systems:     Besides above, an additional 4 point system ROS was reviewed and is unremarkable          Medications:     I have reviewed this patient's medication profile and medications during this hospitalization.           Physical Exam:   Vital Signs: Blood pressure (!) 154/66, pulse 67, temperature 97.6  F (36.4  C), temperature source Oral, resp. rate 22, height 1.905 m (6' 3\"), weight 106.6 kg (235 lb 0.2 oz), SpO2 98 %.   GENERAL: Alert, sitting up in bed    HEENT: Normocephalic, anicteric sclera, moist mucous membranes  LUNGS: breathing non-labored              ====================================================  TT: I have personally spent a total of 25 minutes on the day of the encounter on the unit in review of medical record, consultation with the medical providers and assessment of patient today, with time spent in counseling, " coordination of care, and discussion with patient and family re: diagnostic results, prognosis, symptom management, risks and benefits of management options, and development of plan of care as noted above.      ====================================================    Rosa Isela Houston, CNS  [facility], Palliative Care  Securely message with the [employer] Web Console (learn more here) or  Text page via [employer] Paging/Directory

## 2023-06-23 NOTE — PROGRESS NOTES
Care Management discharge note:    Length of Stay (days): 10    Expected Discharge Date: 06/23/2023     Concerns to be Addressed: Hospice services with [facility]      Patient plan of care discussed at interdisciplinary rounds: Yes    Anticipated Discharge Disposition: Discharge [facility] to patient home with family support  Anticipated Discharge Services:  [facility] services  Anticipated Discharge DME: Oxygen (has home O2), CPAP, Hospice DME    Referrals Placed by CM/SW:  Home with [facility]  Private pay costs discussed: transportation costs-melyssa Boggs    Additional Information:  Writer received call from patient daughter Flakita who sounded quite agitated/frustrated on phone.    After lengthy conversation with daughter and discussed with Ashley [facility] Liaison-Writer able to clarify Flakita wants to use [facility] which will provide patient with CPAP.    The DME that Flakita thought was coming today 9-12 was apparently ordered from [facility] and not [facility].  Per conversation with Ashley, no DME has been ordered by [facility] but per Flakita's request, Ashley will at [facility] this am 9 to review hospice admission/CPAP/Medications need to be called in.    Writer moved BLS from [facility] today 3:10-3:50pm.  PCS form complete.    UPDATE: Per Ashley who met with patient/daughter Flakita-DME has been ordered-[facility] waiting to hear back about CPAP.   [facility] planning to admit at home tonight around 5pm.            Angeles Medina CM

## 2023-06-23 NOTE — PLAN OF CARE
Problem: Gas Exchange Impaired  Goal: Optimal Gas Exchange  Outcome: Progressing     Problem: Muscle Strength Impairment  Goal: Improved Muscle Strength  Outcome: Progressing     RN assumed cares from [id number], no events. PRN dilaudid administered for 9/10 generalized pain w/ good relief. Primofit in place, hematuria noted. BM earlier today, refused bowel meds this hannah. Tele showing NSR before removal. Plan to discharge home on hospice tomorrow.

## 2023-06-23 NOTE — PROGRESS NOTES
[facility]    Medicine Progress Note - Hospitalist Service    Date of Admission:  6/12/2023    Assessment & Plan   Soto Gibbs is a pleasant 83-year-old gentleman with chronic kidney disease, hypertension, peripheral neuropathy, previous ventral hernia repair with mesh, chronic respiratory failure on supplemental oxygen and COPD, who presented after a fall on 6/12/2023.  He reported gross hematuria for several months.  Has known right renal mass with previous biopsy which indicated benign mass and incidental finding of pulmonary nodules.     Admitted for gross hematuria, generalized weakness, fall, progression and chronic anemia likely secondary to continued gross hematuria. Patient developed shortness of breath and acute on chronic respiratory failure with hypoxia secondary to COPD and possible HF with pEF. Improved with respiratory cares, steroids and IV diuresis.  CT guided biopsy of pulmonary nodule performed on 6/14/23 and pathology consistent with metastatic RCC.      Developed hypertensive urgency, increased shortness of breath and atypical chest pain on 6/14.  Elevated D-dimer on 6/15/23.  Nuc. Med. VQ scan -> low probability for PE.  Transitioned to oral diuresis on 6/16/23.       Oncology following.  has been declining TCU placement.  Adjusted opioid analgesia on 6/19/23 for increased somnolence (6/19).  Will need follow up with [facility] and [facility] for suspected renal cancer, with lung metastasis.   Multiple pain complaints.      Palliative care has been following, as well.  Pt and family have decided on home hospice.    Oversedation secondary to opioid analgesia on 6/19.  Acute on chronic respiratory failure with hypoxia and hypercapnia  Respiratory acidosis, acute.  - discontinued all opioid analgesia.   Avoid sedating medications: discontinued lorazepam, and Percocet.   Decreased gabapentin and weaned off  Order heat and ice therapy, topical  lidocaine ointment and diclofenac topical as needed for joint and back pain.     Opioid reversal protocol, q15 min vital signs and RASS assessment.   BIPAP 12/5 ordered. RCAT to follow.     More alert and interactive today     Jerking/twitching episodes -resolved      Likely d/t acute respiratory acidosis d/t CO2 retention     Metatstatic renal cell cancer  Oncology notes reviewed (6/13/23)   Urology consulted (6/13/23) and deferred to ONCOLOGY treatment recs  Recommending palliative care approach even prior to the results of the CT guided lung biopsy (6/14/23)    Palliative care following  Family has decided on home hospice     Chronic respiratory failure with hypoxia  COPD  Elevated d-dimer  CT chest severe emphysema, bilateral pulmonary nodules.  Lower extremity US negative for DVT.   NM VQ scan low probability for PE.   Continue home inhalers and supplemental oxygen at 2L/min  Completed prednisone taper.       Acute kidney injury on Chronic kidney disease stage 3b.   Mild hyperkalemia - resolved  Mild hyponatremia - noted today d/t decreased po intake  Creatinine baseline 2.0  Nephrology has been following   Received IV diureisis was transitioned to po lasix  Creat improved this am but nephrology recommending holding further scheduled lasix, for now, d/t hypernatremia    Gross hematuria  Chronic normocytic anemia  Anemia of chronic disease  Hemoglobin 9.0 g/dL stable.   Iron studies iron sat low at 40, ferritin normal at 245, TIBC low 208 reticulocyte count, iron sat index normal at 19. Vitamin b12 level normal at 454.     Has received IV venofer     Abnormal UA  No urinary tract infection.  Discontinue ceftriaxone as culture grew mixture of urogenital bhavya < 10,000.     Heart failure with preserved ejection fraction.   113 kg to 107 kg on last weight on 6/16.  BNP elevated 545 on admission.  Transthoracic echocardiogram LVEF hyperdynamic 65-70%, no WMA.  Holding furosemide 20 mg po daily, for  "now     Hypertensive urgency - resolved.   Resistant hypertension    Holding Lisinopril 20 mg daily for ELISABETH, hyperkalemia.     Continue atenolol 25mg po daily  Increase Amlodipine 10 mg po daily today.  Increase hydralazine 50 mg po qid with hold parameters today    Will need to review BP meds in the am for d/c       Abdominal pain  Ventral abdominal hernia  History of hernia repair with mesh.   General surgery was consulted, no surgical intervention recommended at this time.   Lactic acid normal.    Pt notes intolerance to tylenol  Will hold scheduled TYLENOL  Discontinue percocet prn  Restart po dilaudid       Medical Decision Making      Addendum - 1350    Returned to room and spoke with pt and family at length (daughter present and daughter on the phone).  All questions answered including medication review (BP and pain meds), will PT come to the house, CPAP and general hospice/discharge questions. Pt more awake and engaged this afternoon.      51 MINUTES SPENT BY ME on the date of service doing chart review, history, exam, documentation & further activities per the note.        PPE Worn:  Mask, gloves     Diet: Renal Diet (non-dialysis)  Snacks/Supplements Adult: Nepro Oral Supplement; With Meals    DVT Prophylaxis: Pneumatic Compression Devices  Hill Catheter: Not present  Lines: None     Cardiac Monitoring: None  Code Status: No CPR- Do NOT Intubate      Clinically Significant Risk Factors         # Hypernatremia: Highest Na = 146 mmol/L in last 2 days, will monitor as appropriate      # Hypoalbuminemia: Lowest albumin = 3.2 g/dL at 6/22/2023  5:18 AM, will monitor as appropriate            # Overweight: Estimated body mass index is 29.37 kg/m  as calculated from the following:    Height as of this encounter: 1.905 m (6' 3\").    Weight as of this encounter: 106.6 kg (235 lb 0.2 oz).           Disposition Plan     home with hospice 6/23/23    Rafael Hernandez MD  Hospitalist Service  [facility] " "[facility]  Securely message with Denise (more info)  Text page via [facility] Paging/Directory   ______________________________________________________________________    Interval History     \"I am ok\". \"I don't want to talk\"  No new concerns per nursing  Palliative care meeting planned for later today    Physical Exam   Vital Signs: Temp: 98.7  F (37.1  C) Temp src: Oral BP: (!) 163/73 Pulse: 69   Resp: 24 SpO2: 98 % O2 Device: Nasal cannula Oxygen Delivery: 2 LPM  Weight: 235 lbs .17 oz    GEN:  Sleeping on right side in bed, easily arousable  Speech is clear and appropriate  HEENT:  Normocephalic/atraumatic, no scleral icterus, no nasal discharge, mouth and membranes moist  CV:  Regular rate and rhythm, no loud murmur or JVD.  S1 + S2 noted, no S3 or S4.  LUNGS:  Clear to auscultation ant/lat bilaterally without clear rales/rhonchi/wheezing/retraction- although exam somewhat limited d/t pt not being cooperative with exam.  Symmetric chest rise on inhalation noted.  ABD:  Active bowel sounds, soft, non-tender to light palpation throuhgout, mildly distended throughout.  No clear rebound/guarding/rigidity.  EXT:  1+ pretitibal edema bilaterally, mild pitting hand edema bilaterally.  No cyanosis.    PSYCH:  Depressed affect; somewhat withdrawn.    Medications     - MEDICATION INSTRUCTIONS -       ACE/ARB/ARNI NOT PRESCRIBED       sodium chloride 0.9%         amLODIPine  10 mg Oral Daily     atenolol  25 mg Oral QAM     diclofenac  4 g Topical 4x Daily     [Held by provider] furosemide  20 mg Oral Daily     hydrALAZINE  50 mg Oral Q6H RACHID     lidocaine  1-3 patch Transdermal Q24H     lidocaine   Transdermal Q8H RACHID     pantoprazole  40 mg Oral QAM AC     polyethylene glycol  17 g Oral BID     senna-docusate  1 tablet Oral BID     sodium chloride (PF)  3 mL Intracatheter Q8H     sucralfate  1 g Oral 4x Daily AC & HS       Data     Labs and Imaging results below reviewed today.  Recent Labs   Lab 06/20/23  1535 " 06/20/23  0550 06/19/23  1211 06/18/23  1010   WBC  --  8.8 9.4 9.4   HGB 9.1* 9.0* 9.6* 9.5*   HCT  --  31.5* 33.8* 32.2*   MCV  --  100 101* 97   PLT  --  152 181 162     Recent Labs   Lab 06/22/23  0518 06/20/23  0550 06/19/23  1634   * 142 141   POTASSIUM 4.3 5.0 5.3*   CHLORIDE 102 100 98   CO2 36* 34* 34*   ANIONGAP 8 8 9    146* 143*   BUN 47* 71* 71*   CR 2.27* 3.11* 3.09*   GFRESTIMATED 28* 19* 19*   LEENA 9.4 9.1 9.2     7-Day Micro Results     Collected Updated Procedure Result Status      06/16/2023 1236 06/18/2023 1035 Urine Culture [[id number]]   Urine, Clean Catch    Final result Component Value   Culture <10,000 CFU/mL Mixture of urogenital bhavya                     No results found for this or any previous visit (from the past 24 hour(s)).

## 2023-06-23 NOTE — PLAN OF CARE
Problem: Coping Ineffective (Oncology Care)  Goal: Effective Coping  Outcome: Progressing     Problem: Pain Acute (Oncology Care)  Goal: Optimal Pain Control  Outcome: Progressing     Problem: Gas Exchange Impaired  Goal: Optimal Gas Exchange  Outcome: Progressing     Pt A&Ox4, able to make needs known. Pt reporting pain 8/10 this shift, PRN dilaudid given with relief. Pt turned intermittently throughout the shift. Pt on 2L NC overnight, no SOB reported. Pt sleeping between cares. No acute changes overnight.    Ana Holm RN  [phone]

## 2023-07-03 PROBLEM — R79.89 ELEVATED BRAIN NATRIURETIC PEPTIDE (BNP) LEVEL: Status: ACTIVE | Noted: 2023-01-01

## 2023-09-27 RX ORDER — HYDROMORPHONE HYDROCHLORIDE 2 MG/1
2-4 TABLET ORAL
Qty: 25 TABLET | Refills: 0 | OUTPATIENT
Start: 2023-09-27

## (undated) RX ORDER — HYDRALAZINE HYDROCHLORIDE 20 MG/ML
INJECTION INTRAMUSCULAR; INTRAVENOUS
Status: DISPENSED
Start: 2023-01-01

## (undated) RX ORDER — FENTANYL CITRATE 50 UG/ML
INJECTION, SOLUTION INTRAMUSCULAR; INTRAVENOUS
Status: DISPENSED
Start: 2023-01-01